# Patient Record
Sex: FEMALE | Race: WHITE | NOT HISPANIC OR LATINO | Employment: OTHER | URBAN - METROPOLITAN AREA
[De-identification: names, ages, dates, MRNs, and addresses within clinical notes are randomized per-mention and may not be internally consistent; named-entity substitution may affect disease eponyms.]

---

## 2019-11-20 ENCOUNTER — OFFICE VISIT (OUTPATIENT)
Dept: GYNECOLOGIC ONCOLOGY | Facility: CLINIC | Age: 64
End: 2019-11-20
Payer: MEDICARE

## 2019-11-20 VITALS
BODY MASS INDEX: 30.91 KG/M2 | SYSTOLIC BLOOD PRESSURE: 140 MMHG | RESPIRATION RATE: 16 BRPM | HEART RATE: 95 BPM | DIASTOLIC BLOOD PRESSURE: 82 MMHG | WEIGHT: 169 LBS

## 2019-11-20 DIAGNOSIS — C54.1 ENDOMETRIAL CANCER (HCC): Primary | ICD-10-CM

## 2019-11-20 PROCEDURE — 99202 OFFICE O/P NEW SF 15 MIN: CPT | Performed by: OBSTETRICS & GYNECOLOGY

## 2019-11-20 NOTE — ASSESSMENT & PLAN NOTE
Patient with endometrial hyperplasia with atypia underwent laparoscopic hysterectomy bilateral salpingo-oophorectomy by Dr Beth Gilbert with incidental diagnosis of likely stage IA grade 1 endometrial cancer  Low risk  Review by [facility] pathology pending  I have discussed with patient natural history of early stage/low risk endometrial cancer  As per [employer] guidelines, I will order a CT scan of the chest, abdomen and pelvis to rule out the remote possibility of lymphadenopathy or other suspicious findings  In the absence of those, there is no indication for treatment and we will recommend surveillance  I discussed surveillance program with visits approximately every 6 months the 1st 2 years and yearly after that  I plan to see her back with results of CT scan and after review of outside pathology in approximately 2-3 months  Routine surveillance to start after that  She has been encouraged to continue to follow with Dr Beth Gilbert for yearly gynecologic exams and ongoing gynecologic health needs

## 2019-11-20 NOTE — LETTER
November 20, 2019     Alexey Blanco MD  [address] [facility] [address]    Patient: Carlee James   YOB: 1955   Date of Visit: 11/20/2019       Dear Dr Mily Boswell: Thank you for referring France Todd to me for evaluation  Below are my notes for this consultation  If you have questions, please do not hesitate to call me  I look forward to following your patient along with you  Sincerely,        Justice Carnes MD        CC: No Recipients  Justice Carnes MD  11/20/2019  4:35 PM  Sign at close encounter  Assessment/Plan:    Problem List Items Addressed This Visit        Genitourinary    Endometrial cancer [facility]) - Primary     Patient with endometrial hyperplasia with atypia underwent laparoscopic hysterectomy bilateral salpingo-oophorectomy by Dr Mily Boswell with incidental diagnosis of likely stage IA grade 1 endometrial cancer  Low risk  Review by [facility] pathology pending  I have discussed with patient natural history of early stage/low risk endometrial cancer  As per [employer] guidelines, I will order a CT scan of the chest, abdomen and pelvis to rule out the remote possibility of lymphadenopathy or other suspicious findings  In the absence of those, there is no indication for treatment and we will recommend surveillance  I discussed surveillance program with visits approximately every 6 months the 1st 2 years and yearly after that  I plan to see her back with results of CT scan and after review of outside pathology in approximately 2-3 months  Routine surveillance to start after that  She has been encouraged to continue to follow with Dr Mily Boswell for yearly gynecologic exams and ongoing gynecologic health needs  Relevant Orders    CT chest abdomen pelvis w contrast    BUN    Creatinine, serum        I spent approximately 30 minutes in the care of this patient    More than 50% of the time was devoted to review of outside records, face-to-face counseling regarding natural history of atypical endometrial hyperplasia, natural history and treatment recommendations for endometrial cancer, discussion of upcoming plan of care and answering questions  All questions were answered to patient and 's satisfaction  CHIEF COMPLAINT:   Here for consultation / 2nd opinion due to newly diagnosed endometrial cancer  Subjective:     Problem:  Cancer Staging  Endometrial cancer [facility])  Staging form: Corpus Uteri - Carcinoma, AJCC 8th Edition  - Clinical: No stage assigned - Unsigned        Patient ID: Farhana Verdin is a 71 y o  female  HPI  74-year-old  menopausal female referred for consultation by Dr Susie Ervin after recent diagnosis of endometrial cancer  She underwent menopause in her late 45s  Recently, experience postmenopausal bleeding and biopsy demonstrated atypical endometrial hyperplasia  After counseling, she underwent laparoscopic hysterectomy bilateral salpingo-oophorectomy 2019 at [facility]   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, FIGO grade 1, invasive 5/15 mm, there was no evidence of lymphovascular invasion and no cervical stromal invasion  No evidence of extra uterine disease  Findings consistent with likely stage IA grade 1 tumor  Comprehensive surgical staging was not performed  Patient was referred for consultation/2nd opinion management recommendations  She is asymptomatic  Has recovered well from surgery  Denies vaginal bleeding, drainage or discharge  She is scheduled to have postoperative vaginal cuff check with Dr Susie Ervin later this week  Colonoscopy and mammogram up-to-date  Both performed in the summer of 2019  Reportedly normal     Review of Systems  As above  Twelve point review of systems otherwise unremarkable  No current outpatient medications on file       No current facility-administered medications for this visit  No Known Allergies    Past Medical History:   Diagnosis Date    Chronic kidney disease        Past Surgical History:   Procedure Laterality Date    HYSTERECTOMY  10/23/2016    Dr Nanci Mcdonald &INDWELL STENT INSRT Left 2016    Procedure: CYSTOSCOPY; URETEROSCOPY; HOLMIUM LASER LITHOTRIPSY; BASKET STONE EXTRACTION; RETROGRADE PYELOGRAM; STENT PLACEMENT ;  Surgeon: Dariela Sanchez MD;  [facility];  Service: Urology    OR CYSTO/URETERO W/LITHOTRIPSY &INDWELL STENT INSRT Left 2016    Procedure: Tao Ortiz;  Surgeon: Dariela Sanchez MD;  [facility];  Service: Urology    TUBAL LIGATION         OB History        3    Para   3    Term                AB        Living           SAB        TAB        Ectopic        Multiple        Live Births                     History reviewed  No pertinent family history  The following portions of the patient's history were reviewed and updated as appropriate: allergies, current medications, past family history, past medical history, past social history, past surgical history and problem list       Objective:    Blood pressure 140/82, pulse 95, resp  rate 16, weight 76 7 kg (169 lb)  Body mass index is 30 91 kg/m²  Physical Exam  Physical exam deferred  Surgical pathology reports from [facility] reviewed  Review by [facility]'s pathology pending  Reports scanned into Epic      Jj Holman MD, [address]  2019  4:34 PM

## 2019-11-20 NOTE — PROGRESS NOTES
Assessment/Plan:    Problem List Items Addressed This Visit        Genitourinary    Endometrial cancer ([facility] [address] ) - Primary     Patient with endometrial hyperplasia with atypia underwent laparoscopic hysterectomy bilateral salpingo-oophorectomy by Dr Josselyn Sykes with incidental diagnosis of likely stage IA grade 1 endometrial cancer  Low risk  Review by [facility] pathology pending  I have discussed with patient natural history of early stage/low risk endometrial cancer  As per [facility] guidelines, I will order a CT scan of the chest, abdomen and pelvis to rule out the remote possibility of lymphadenopathy or other suspicious findings  In the absence of those, there is no indication for treatment and we will recommend surveillance  I discussed surveillance program with visits approximately every 6 months the 1st 2 years and yearly after that  I plan to see her back with results of CT scan and after review of outside pathology in approximately 2-3 months  Routine surveillance to start after that  She has been encouraged to continue to follow with Dr Josselyn Sykes for yearly gynecologic exams and ongoing gynecologic health needs  Relevant Orders    CT chest abdomen pelvis w contrast    BUN    Creatinine, serum        I spent approximately 30 minutes in the care of this patient  More than 50% of the time was devoted to review of outside records, face-to-face counseling regarding natural history of atypical endometrial hyperplasia, natural history and treatment recommendations for endometrial cancer, discussion of upcoming plan of care and answering questions  All questions were answered to patient and 's satisfaction  CHIEF COMPLAINT:   Here for consultation / 2nd opinion due to newly diagnosed endometrial cancer      Subjective:     Problem:  Cancer Staging  Endometrial cancer [facility])  Staging form: Corpus Uteri - Carcinoma, AJCC 8th Edition  - Clinical: No stage assigned - Unsigned        Patient ID: Nitza Horvath is a 71 y o  female  HPI  54-year-old  menopausal female referred for consultation by Dr Bessy Prakash after recent diagnosis of endometrial cancer  She underwent menopause in her late 45s  Recently, experience postmenopausal bleeding and biopsy demonstrated atypical endometrial hyperplasia  After counseling, she underwent laparoscopic hysterectomy bilateral salpingo-oophorectomy 2019 at [facility]   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, FIGO grade 1, invasive 5/15 mm, there was no evidence of lymphovascular invasion and no cervical stromal invasion  No evidence of extra uterine disease  Findings consistent with likely stage IA grade 1 tumor  Comprehensive surgical staging was not performed  Patient was referred for consultation/2nd opinion management recommendations  She is asymptomatic  Has recovered well from surgery  Denies vaginal bleeding, drainage or discharge  She is scheduled to have postoperative vaginal cuff check with Dr Bessy Prakash later this week  Colonoscopy and mammogram up-to-date  Both performed in the summer of 2019  Reportedly normal     Review of Systems  As above  Twelve point review of systems otherwise unremarkable  No current outpatient medications on file  No current facility-administered medications for this visit          No Known Allergies    Past Medical History:   Diagnosis Date    Chronic kidney disease        Past Surgical History:   Procedure Laterality Date    HYSTERECTOMY  10/23/2016    Dr Coco Lockett &INDWELL STENT INSRT Left 2016    Procedure: CYSTOSCOPY; URETEROSCOPY; HOLMIUM LASER LITHOTRIPSY; BASKET STONE EXTRACTION; RETROGRADE PYELOGRAM; STENT PLACEMENT ;  Surgeon: Navjot Bradshaw MD;  [facility];  Service: Urology    [place] CYSTO/URETERO W/LITHOTRIPSY &INDWELL STENT INSRT Left 2016    Procedure: CYSTOSCOPY, INSERTION STENT URETERAL;  Surgeon: Inés Bernstein MD;  [facility];  Service: Urology    TUBAL LIGATION         OB History        3    Para   3    Term                AB        Living           SAB        TAB        Ectopic        Multiple        Live Births                     History reviewed  No pertinent family history  The following portions of the patient's history were reviewed and updated as appropriate: allergies, current medications, past family history, past medical history, past social history, past surgical history and problem list       Objective:    Blood pressure 140/82, pulse 95, resp  rate 16, weight 76 7 kg (169 lb)  Body mass index is 30 91 kg/m²  Physical Exam  Physical exam deferred  Surgical pathology reports from [facility] reviewed  Review by [facility] pathology pending  Reports scanned into Epic      Gregory Fernandez MD, [facility]  2019  4:34 PM

## 2019-11-25 ENCOUNTER — APPOINTMENT (OUTPATIENT)
Dept: LAB | Facility: CLINIC | Age: 64
End: 2019-11-25
Payer: MEDICARE

## 2019-11-25 DIAGNOSIS — C54.1 ENDOMETRIAL CANCER (HCC): ICD-10-CM

## 2019-11-25 LAB
BUN SERPL-MCNC: 10 MG/DL (ref 5–25)
CREAT SERPL-MCNC: 0.68 MG/DL (ref 0.6–1.3)
GFR SERPL CREATININE-BSD FRML MDRD: 92 ML/MIN/1.73SQ M

## 2019-11-25 PROCEDURE — 84520 ASSAY OF UREA NITROGEN: CPT

## 2019-11-25 PROCEDURE — 36415 COLL VENOUS BLD VENIPUNCTURE: CPT

## 2019-11-25 PROCEDURE — 82565 ASSAY OF CREATININE: CPT

## 2019-11-29 ENCOUNTER — HOSPITAL ENCOUNTER (OUTPATIENT)
Dept: RADIOLOGY | Facility: HOSPITAL | Age: 64
Discharge: HOME | End: 2019-11-29
Attending: OBSTETRICS & GYNECOLOGY
Payer: MEDICARE

## 2019-11-29 DIAGNOSIS — C54.1 ENDOMETRIAL CANCER (HCC): ICD-10-CM

## 2019-11-29 PROCEDURE — 71260 CT THORAX DX C+: CPT

## 2019-11-29 PROCEDURE — 74177 CT ABD & PELVIS W/CONTRAST: CPT

## 2019-11-29 RX ADMIN — IOHEXOL 100 ML: 350 INJECTION, SOLUTION INTRAVENOUS at 15:59

## 2020-02-19 ENCOUNTER — OFFICE VISIT (OUTPATIENT)
Dept: GYNECOLOGIC ONCOLOGY | Facility: CLINIC | Age: 65
End: 2020-02-19
Payer: MEDICARE

## 2020-02-19 VITALS
HEIGHT: 62 IN | WEIGHT: 172 LBS | SYSTOLIC BLOOD PRESSURE: 124 MMHG | TEMPERATURE: 98.1 F | HEART RATE: 78 BPM | DIASTOLIC BLOOD PRESSURE: 82 MMHG | BODY MASS INDEX: 31.65 KG/M2

## 2020-02-19 DIAGNOSIS — Z85.42 ENCOUNTER FOR FOLLOW-UP SURVEILLANCE OF ENDOMETRIAL CANCER: Primary | ICD-10-CM

## 2020-02-19 DIAGNOSIS — Z08 ENCOUNTER FOR FOLLOW-UP SURVEILLANCE OF ENDOMETRIAL CANCER: Primary | ICD-10-CM

## 2020-02-19 PROCEDURE — 99212 OFFICE O/P EST SF 10 MIN: CPT | Performed by: OBSTETRICS & GYNECOLOGY

## 2020-02-19 NOTE — LETTER
February 19, 2020     Russel Bhatti MD  [address] [facility] [address]    Patient: Sherif Dunbar   YOB: 1955   Date of Visit: 2/19/2020       Dear Dr Heather Spencer: Thank you for referring René Moses to me for evaluation  Below are my notes for this consultation  If you have questions, please do not hesitate to call me  I look forward to following your patient along with you  Sincerely,        Tomi Moon MD        CC: No Recipients  Tomi Moon MD  2/19/2020  2:19 PM  Sign at close encounter  Assessment/Plan:    Problem List Items Addressed This Visit        Other    Encounter for follow-up surveillance of endometrial cancer - Primary     Patient has no clinical evidence of disease  Baseline CT scan shows no evidence of lymphadenopathy or other measurable disease  I plan to see her back in 6 months for routine surveillance  She knows to contact us if she develops abdominal or pelvic pain vaginal bleeding, drainage, discharge, changes in bowel or bladder function or any new symptoms  CHIEF COMPLAINT:   Surveillance for endometrial cancer      Problem:  Cancer Staging  Encounter for follow-up surveillance of endometrial cancer  Staging form: Corpus Uteri - Carcinoma, AJCC 8th Edition  - Clinical stage from 10/23/2019: FIGO Stage IA (cT1a, cN0, cM0) - Signed by Tomi Moon MD on 2/19/2020  - Pathologic: FIGO Stage IA (pT1a) - Unsigned        Previous therapy:     Encounter for follow-up surveillance of endometrial cancer    10/23/2019 -  Cancer Staged     Staging form: Corpus Uteri - Carcinoma, AJCC 8th Edition  - Clinical stage from 10/23/2019: FIGO Stage IA (cT1a, cN0, cM0) - Signed by Tomi Moon MD on 2/19/2020  Tumor size (mm): 30  Histologic grade (G): G1  Histologic grading system: 3 grade system  Histopathologic type: Endometrioid adenocarcinoma, NOS  Diagnostic confirmation: Positive histology  Specimen type: Excision  Staged by: Managing physician  Lymph node metastasis: Absent  Omentectomy performed: No  Morcellation performed: No  Stage used in treatment planning: Yes  National guidelines used in treatment planning: Yes        10/23/2019 Surgery     Robotic hysterectomy bilateral salpingo-oophorectomy at [facility] by Dr Adrienne Wan  Findings consistent with likely clinical stage I grade 1 endometrial cancer  Adjuvant therapy not indicated  Patient ID: Roselia Vargas is a 71 y o  female  HPI  Patient underwent robotic hysterectomy bilateral salpingo-oophorectomy for endometrial intraepithelial neoplasia in October 2019  Final pathology demonstrated incidental likely stage IA grade 1 endometrial cancer with no high risk features  She was evaluated by me shortly after surgery and CT scan performed which demonstrated no evidence of lymphadenopathy or other abnormalities  Recommended observation  Presents today for follow-up  Denies vaginal bleeding, drainage or discharge  Denies changes in bowel or bladder function  Denies abdominal or pelvic pain  The following portions of the patient's history were reviewed and updated as appropriate: allergies, current medications, past family history, past medical history, past social history, past surgical history and problem list     Review of Systems  Above  Twelve point review of systems otherwise unremarkable  No current outpatient medications on file  No current facility-administered medications for this visit  Objective:    Blood pressure 124/82, pulse 78, temperature 98 1 °F (36 7 °C), temperature source Oral, height 5' 2" (1 575 m), weight 78 kg (172 lb)  Body mass index is 31 46 kg/m²  Body surface area is 1 79 meters squared  Physical Exam   Constitutional: She appears well-developed and well-nourished  No distress  Cardiovascular: Normal rate, regular rhythm and normal heart sounds  No murmur heard    Pulmonary/Chest: Effort normal and breath sounds normal  No respiratory distress  Abdominal: Soft  Bowel sounds are normal  She exhibits no distension  Genitourinary:   Genitourinary Comments: Normal external genitalia  Vulva vagina with no lesions  No discharge  Vagina with scant residual visible suture material in place  No dehiscence  No blood  No tumor  No granulation tissue  Bimanual exam negative for masses  Cervix, uterus and bilateral tubes and ovaries are surgically absent  Skin: She is not diaphoretic       Jose Watkins MD, [address]  2/19/2020  2:19 PM

## 2020-02-19 NOTE — PROGRESS NOTES
Assessment/Plan:    Problem List Items Addressed This Visit        Other    Encounter for follow-up surveillance of endometrial cancer - Primary     Patient has no clinical evidence of disease  Baseline CT scan shows no evidence of lymphadenopathy or other measurable disease  I plan to see her back in 6 months for routine surveillance  She knows to contact us if she develops abdominal or pelvic pain vaginal bleeding, drainage, discharge, changes in bowel or bladder function or any new symptoms  CHIEF COMPLAINT:   Surveillance for endometrial cancer  Problem:  Cancer Staging  Encounter for follow-up surveillance of endometrial cancer  Staging form: Corpus Uteri - Carcinoma, AJCC 8th Edition  - Clinical stage from 10/23/2019: FIGO Stage IA (cT1a, cN0, cM0) - Signed by Jason Patton MD on 2/19/2020  - Pathologic: FIGO Stage IA (pT1a) - Unsigned        Previous therapy:     Encounter for follow-up surveillance of endometrial cancer    10/23/2019 -  Cancer Staged     Staging form: Corpus Uteri - Carcinoma, AJCC 8th Edition  - Clinical stage from 10/23/2019: FIGO Stage IA (cT1a, cN0, cM0) - Signed by Jason Patton MD on 2/19/2020  Tumor size (mm): 30  Histologic grade (G): G1  Histologic grading system: 3 grade system  Histopathologic type: Endometrioid adenocarcinoma, NOS  Diagnostic confirmation: Positive histology  Specimen type: Excision  Staged by: Managing physician  Lymph node metastasis: Absent  Omentectomy performed: No  Morcellation performed: No  Stage used in treatment planning: Yes  National guidelines used in treatment planning: Yes        10/23/2019 Surgery     Robotic hysterectomy bilateral salpingo-oophorectomy at [facility] by Dr Edson Ramos  Findings consistent with likely clinical stage I grade 1 endometrial cancer  Adjuvant therapy not indicated             Patient ID: Hailey Muñiz is a 71 y o  female  HPI  Patient underwent robotic hysterectomy bilateral salpingo-oophorectomy for endometrial intraepithelial neoplasia in October 2019  Final pathology demonstrated incidental likely stage IA grade 1 endometrial cancer with no high risk features  She was evaluated by me shortly after surgery and CT scan performed which demonstrated no evidence of lymphadenopathy or other abnormalities  Recommended observation  Presents today for follow-up  Denies vaginal bleeding, drainage or discharge  Denies changes in bowel or bladder function  Denies abdominal or pelvic pain  The following portions of the patient's history were reviewed and updated as appropriate: allergies, current medications, past family history, past medical history, past social history, past surgical history and problem list     Review of Systems  Above  Twelve point review of systems otherwise unremarkable  No current outpatient medications on file  No current facility-administered medications for this visit  Objective:    Blood pressure 124/82, pulse 78, temperature 98 1 °F (36 7 °C), temperature source Oral, height 5' 2" (1 575 m), weight 78 kg (172 lb)  Body mass index is 31 46 kg/m²  Body surface area is 1 79 meters squared  Physical Exam   Constitutional: She appears well-developed and well-nourished  No distress  Cardiovascular: Normal rate, regular rhythm and normal heart sounds  No murmur heard  Pulmonary/Chest: Effort normal and breath sounds normal  No respiratory distress  Abdominal: Soft  Bowel sounds are normal  She exhibits no distension  Genitourinary:   Genitourinary Comments: Normal external genitalia  Vulva vagina with no lesions  No discharge  Vagina with scant residual visible suture material in place  No dehiscence  No blood  No tumor  No granulation tissue  Bimanual exam negative for masses  Cervix, uterus and bilateral tubes and ovaries are surgically absent  Skin: She is not diaphoretic       Enid Hadley MD, [address] [place] [address]  2/19/2020 2:19 PM

## 2020-02-19 NOTE — ASSESSMENT & PLAN NOTE
Patient has no clinical evidence of disease  Baseline CT scan shows no evidence of lymphadenopathy or other measurable disease  I plan to see her back in 6 months for routine surveillance  She knows to contact us if she develops abdominal or pelvic pain vaginal bleeding, drainage, discharge, changes in bowel or bladder function or any new symptoms

## 2020-08-17 ENCOUNTER — TELEPHONE (OUTPATIENT)
Dept: GYNECOLOGIC ONCOLOGY | Facility: CLINIC | Age: 65
End: 2020-08-17

## 2020-08-17 NOTE — TELEPHONE ENCOUNTER
Phone call to the patient to reschedule her appointment on 8/19 with Dr David Butts due to him being out of the office  He has availability the following day if that works for the patient  Left message to return my call

## 2020-08-19 ENCOUNTER — TELEPHONE (OUTPATIENT)
Dept: GYNECOLOGIC ONCOLOGY | Facility: CLINIC | Age: 65
End: 2020-08-19

## 2020-08-19 NOTE — TELEPHONE ENCOUNTER
Patient called back to go over screening questions for appointment on 8/20  All answers were negative  Patient aware of masking policy and 1 visitor policy as well

## 2020-08-20 ENCOUNTER — OFFICE VISIT (OUTPATIENT)
Dept: GYNECOLOGIC ONCOLOGY | Facility: CLINIC | Age: 65
End: 2020-08-20
Payer: MEDICARE

## 2020-08-20 VITALS
OXYGEN SATURATION: 98 % | HEART RATE: 69 BPM | HEIGHT: 62 IN | DIASTOLIC BLOOD PRESSURE: 82 MMHG | WEIGHT: 174 LBS | BODY MASS INDEX: 32.02 KG/M2 | SYSTOLIC BLOOD PRESSURE: 140 MMHG | TEMPERATURE: 98.1 F

## 2020-08-20 DIAGNOSIS — Z08 ENCOUNTER FOR FOLLOW-UP SURVEILLANCE OF ENDOMETRIAL CANCER: Primary | ICD-10-CM

## 2020-08-20 DIAGNOSIS — Z12.39 BREAST CANCER SCREENING: ICD-10-CM

## 2020-08-20 DIAGNOSIS — Z85.42 ENCOUNTER FOR FOLLOW-UP SURVEILLANCE OF ENDOMETRIAL CANCER: Primary | ICD-10-CM

## 2020-08-20 PROCEDURE — 99213 OFFICE O/P EST LOW 20 MIN: CPT | Performed by: OBSTETRICS & GYNECOLOGY

## 2020-08-20 NOTE — ASSESSMENT & PLAN NOTE
Patient had robotic hysterectomy for endometrial intraepithelial neoplasia by Dr Alanna Mccullough at [facility] with incidental finding of low risk stage IA endometrial cancer  She remains with no evidence of disease  Plan to see her back at 6 month intervals until she has 2 years out from diagnosis  She knows to contact us if she develops any new symptoms

## 2020-08-20 NOTE — PROGRESS NOTES
Assessment/Plan:    Problem List Items Addressed This Visit        Other    Encounter for follow-up surveillance of endometrial cancer - Primary     Patient had robotic hysterectomy for endometrial intraepithelial neoplasia by Dr Erick Mayers at [facility] with incidental finding of low risk stage IA endometrial cancer  She remains with no evidence of disease  Plan to see her back at 6 month intervals until she has 2 years out from diagnosis  She knows to contact us if she develops any new symptoms  Breast cancer screening     Due for mammogram   Mammogram ordered  Relevant Orders    Mammo screening bilateral w 3d & cad            CHIEF COMPLAINT:   Here for endometrial cancer surveillance  Due for mammogram next month  Problem:  Cancer Staging  Encounter for follow-up surveillance of endometrial cancer  Staging form: Corpus Uteri - Carcinoma, AJCC 8th Edition  - Clinical stage from 10/23/2019: FIGO Stage IA (cT1a, cN0, cM0) - Signed by Rigo Jenkins MD on 2/19/2020  - Pathologic: FIGO Stage IA (pT1a) - Unsigned        Previous therapy:  Oncology History   Encounter for follow-up surveillance of endometrial cancer   10/23/2019 -  Cancer Staged    Staging form: Corpus Uteri - Carcinoma, AJCC 8th Edition  - Clinical stage from 10/23/2019: FIGO Stage IA (cT1a, cN0, cM0) - Signed by Rigo Jenkins MD on 2/19/2020  Tumor size (mm): 30  Histologic grade (G): G1  Histologic grading system: 3 grade system  Histopathologic type: Endometrioid adenocarcinoma, NOS  Diagnostic confirmation: Positive histology  Specimen type: Excision  Staged by: Managing physician  Lymph node metastasis: Absent  Omentectomy performed: No  Morcellation performed: No  Stage used in treatment planning: Yes  National guidelines used in treatment planning: Yes       10/23/2019 Surgery    Robotic hysterectomy bilateral salpingo-oophorectomy at [facility] by Dr Erick Mayers    Findings consistent with likely clinical stage I grade 1 endometrial cancer  Adjuvant therapy not indicated  Patient ID: Kathrine Uriostegui is a 76 y o  female  HPI  Patient presents for routine follow-up for low risk stage IA endometrial cancer  She had robotic hysterectomy in October 2019 for endometrial intraepithelial neoplasia with incidental diagnosis of 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  Low risk  Adjuvant therapy was not indicated  CT scan was negative  She denies vaginal bleeding, drainage or discharge  Denies pelvic or abdominal pain  Denies breast masses, nipple discharge or other complaints  The following portions of the patient's history were reviewed and updated as appropriate: allergies, current medications, past family history, past medical history, past social history, past surgical history and problem list     Review of Systems  As above  Twelve point review of systems is otherwise unremarkable  No current outpatient medications on file  No current facility-administered medications for this visit  Objective:    Blood pressure 140/82, pulse 69, temperature 98 1 °F (36 7 °C), temperature source Oral, height 5' 2" (1 575 m), weight 78 9 kg (174 lb), SpO2 98 %  Body mass index is 31 83 kg/m²  Body surface area is 1 8 meters squared  Physical Exam  Vitals signs reviewed  Constitutional:       Appearance: Normal appearance  She is not ill-appearing  Eyes:      General: No scleral icterus  Right eye: No discharge  Left eye: No discharge  Conjunctiva/sclera: Conjunctivae normal    Pulmonary:      Effort: Pulmonary effort is normal    Abdominal:      General: Abdomen is flat  There is no distension  Palpations: There is no mass  Tenderness: There is no abdominal tenderness  Hernia: No hernia is present  Genitourinary:     Comments: Normal external female genitalia  Normal Bartholin's and Bowbells's glands   Normal urethral meatus and no evidence of urethral discharge or masses  Bladder without fullness mass or tenderness  Vagina without lesion or discharge  No significant pelvic organ prolapse noted  Cervix and uterus are surgically absent  Bimanual exam demonstrates no evidence of pelvic masses  Anus without fissure of lesion  Neurological:      Mental Status: She is alert         Beverly Rae MD, [address] [place] [address], [facility]  8/20/2020  10:14 AM

## 2021-02-12 ENCOUNTER — HOSPITAL ENCOUNTER (OUTPATIENT)
Dept: RADIOLOGY | Age: 66
Discharge: HOME | End: 2021-02-12
Payer: MEDICARE

## 2021-02-12 VITALS — HEIGHT: 62 IN | BODY MASS INDEX: 30.55 KG/M2 | WEIGHT: 166 LBS

## 2021-02-12 DIAGNOSIS — Z12.31 ENCOUNTER FOR SCREENING MAMMOGRAM FOR MALIGNANT NEOPLASM OF BREAST: ICD-10-CM

## 2021-02-12 DIAGNOSIS — Z12.39 BREAST CANCER SCREENING: ICD-10-CM

## 2021-02-12 PROCEDURE — 77067 SCR MAMMO BI INCL CAD: CPT

## 2021-02-12 PROCEDURE — 77063 BREAST TOMOSYNTHESIS BI: CPT

## 2021-02-24 ENCOUNTER — OFFICE VISIT (OUTPATIENT)
Dept: GYNECOLOGIC ONCOLOGY | Facility: CLINIC | Age: 66
End: 2021-02-24
Payer: MEDICARE

## 2021-02-24 VITALS
RESPIRATION RATE: 18 BRPM | DIASTOLIC BLOOD PRESSURE: 82 MMHG | TEMPERATURE: 98.9 F | HEART RATE: 62 BPM | BODY MASS INDEX: 30.91 KG/M2 | WEIGHT: 168 LBS | HEIGHT: 62 IN | SYSTOLIC BLOOD PRESSURE: 144 MMHG

## 2021-02-24 DIAGNOSIS — Z08 ENCOUNTER FOR FOLLOW-UP SURVEILLANCE OF ENDOMETRIAL CANCER: Primary | ICD-10-CM

## 2021-02-24 DIAGNOSIS — Z85.42 HISTORY OF ENDOMETRIAL CANCER: ICD-10-CM

## 2021-02-24 DIAGNOSIS — Z85.42 ENCOUNTER FOR FOLLOW-UP SURVEILLANCE OF ENDOMETRIAL CANCER: Primary | ICD-10-CM

## 2021-02-24 PROCEDURE — 99212 OFFICE O/P EST SF 10 MIN: CPT | Performed by: NURSE PRACTITIONER

## 2021-02-24 NOTE — ASSESSMENT & PLAN NOTE
50-year-old with a history of stage IA1 endometrial cancer diagnosed in October 2019  Low risk; no adjuvant therapy was indicated  She is feeling well  Gyn exam is normal  Her PS is 0  RTO in 6 months for next surveillance visit  She will call in the interim with any new concerns

## 2021-02-24 NOTE — PROGRESS NOTES
Assessment/Plan:    Problem List Items Addressed This Visit        Other    Encounter for follow-up surveillance of endometrial cancer - Primary     50-year-old with a history of stage IA1 endometrial cancer diagnosed in October 2019  Low risk; no adjuvant therapy was indicated  She is feeling well  Gyn exam is normal  Her PS is 0  RTO in 6 months for next surveillance visit  She will call in the interim with any new concerns  History of endometrial cancer            CHIEF COMPLAINT: Endometrial cancer surveillance      Subjective:     Problem:  Cancer Staging  Encounter for follow-up surveillance of endometrial cancer  Staging form: Corpus Uteri - Carcinoma, AJCC 8th Edition  - Clinical stage from 10/23/2019: FIGO Stage IA (cT1a, cN0, cM0) - Signed by Beatriz Moran MD on 2/19/2020  - Pathologic: FIGO Stage IA (pT1a) - Unsigned      Previous therapy:  Oncology History   Encounter for follow-up surveillance of endometrial cancer   10/23/2019 -  Cancer Staged    Staging form: Corpus Uteri - Carcinoma, AJCC 8th Edition  - Clinical stage from 10/23/2019: FIGO Stage IA (cT1a, cN0, cM0) - Signed by Beatriz Moran MD on 2/19/2020  Tumor size (mm): 30  Histologic grade (G): G1  Histologic grading system: 3 grade system  Histopathologic type: Endometrioid adenocarcinoma, NOS  Diagnostic confirmation: Positive histology  Specimen type: Excision  Staged by: Managing physician  Lymph node metastasis: Absent  Omentectomy performed: No  Morcellation performed: No  Stage used in treatment planning: Yes  National guidelines used in treatment planning: Yes       10/23/2019 Surgery    Robotic hysterectomy bilateral salpingo-oophorectomy at [facility] by Dr Wu Beam  Findings consistent with likely clinical stage I grade 1 endometrial cancer  Adjuvant therapy not indicated  Patient ID: Rick Rollins is a 78 y o  female     Rasheeda Sanchez has no new concerns since her last visit   She denies nausea or vomiting  Her appetite is appropriate  Normal bowel and bladder function  She denies abdominal or pelvic pain  She is without vaginal bleeding or discharge  She is ambulatory  UTD with health care maintenance  Review of Systems   Constitutional: Negative for activity change, appetite change, chills, fatigue, fever and unexpected weight change  HENT: Negative for mouth sores  Eyes: Negative  Respiratory: Negative for cough, chest tightness, shortness of breath and wheezing  Cardiovascular: Negative for chest pain, palpitations and leg swelling  Gastrointestinal: Negative for abdominal distention, abdominal pain, anal bleeding, blood in stool, constipation, diarrhea, nausea and vomiting  Endocrine: Negative  Genitourinary: Negative for difficulty urinating, dysuria, flank pain, frequency, hematuria, pelvic pain, urgency, vaginal bleeding, vaginal discharge and vaginal pain  Musculoskeletal: Negative for arthralgias and myalgias  Skin: Negative for color change, pallor and rash  Neurological: Negative for dizziness, weakness, numbness and headaches  Hematological: Negative  Psychiatric/Behavioral: The patient is not nervous/anxious  No current outpatient medications on file  No current facility-administered medications for this visit          No Known Allergies    Past Medical History:   Diagnosis Date    Chronic kidney disease        Past Surgical History:   Procedure Laterality Date    HYSTERECTOMY  10/23/2016    Dr Baron Lyon &[facility]WE[facility] STENT INSRT Left 6/1/2016    Procedure: CYSTOSCOPY; URETEROSCOPY; HOLMIUM LASER LITHOTRIPSY; BASKET STONE EXTRACTION; RETROGRADE PYELOGRAM; STENT PLACEMENT ;  Surgeon: Sera Baum MD;  [facility];  Service: Urology    MD CYSTO/URETERO W/LITHOTRIPSY &INDWELL STENT INSRT Left 4/22/2016    Procedure: Christoph Mai STENT URETERAL;  Surgeon: Sera Baum MD; [facility];  Service: Urology    TUBAL LIGATION         OB History        3    Para   3    Term   3            AB        Living           SAB        TAB        Ectopic        Multiple        Live Births                     Family History   Problem Relation Age of Onset    No Known Problems Mother     No Known Problems Father     No Known Problems Sister     No Known Problems Maternal Grandmother     No Known Problems Maternal Grandfather     No Known Problems Paternal Grandmother     No Known Problems Paternal Grandfather     No Known Problems Maternal Aunt     No Known Problems Maternal Aunt     No Known Problems Maternal Aunt     No Known Problems Maternal Aunt     No Known Problems Paternal Aunt        The following portions of the patient's history were reviewed and updated as appropriate: allergies, current medications, past family history, past medical history, past social history, past surgical history and problem list       Objective:    Blood pressure 144/82, pulse 62, temperature 98 9 °F (37 2 °C), temperature source Temporal, resp  rate 18, height 5' 2" (1 575 m), weight 76 2 kg (168 lb)  Body mass index is 30 73 kg/m²  Physical Exam  Vitals signs reviewed  Exam conducted with a chaperone present  Constitutional:       General: She is not in acute distress  Appearance: Normal appearance  She is not ill-appearing  HENT:      Head: Normocephalic and atraumatic  Mouth/Throat:      Mouth: Mucous membranes are moist    Eyes:      General:         Right eye: No discharge  Left eye: No discharge  Conjunctiva/sclera: Conjunctivae normal    Pulmonary:      Effort: Pulmonary effort is normal  No respiratory distress  Abdominal:      Palpations: Abdomen is soft  There is no mass  Tenderness: There is no abdominal tenderness  Hernia: No hernia is present  Genitourinary:     Comments:  The external female genitalia is normal  The bartholin's, uretheral and skenes glands are normal  The urethral meatus is normal (midline with no lesions)  Anus without fissure or lesion  Speculum exam reveals a grossly normal vagina  No masses, lesions,discharge or bleeding  No significant cystocele or rectocele noted  Bimanual exam notes a surgical absent cervix, uterus and adnexal structures  No masses or fullness  Bladder is without fullness, mass or tenderness  Musculoskeletal:      Right lower leg: No edema  Left lower leg: No edema  Skin:     General: Skin is warm and dry  Coloration: Skin is not jaundiced  Findings: No rash  Neurological:      General: No focal deficit present  Mental Status: She is alert and oriented to person, place, and time  Cranial Nerves: No cranial nerve deficit  Sensory: No sensory deficit  Motor: No weakness  Gait: Gait normal    Psychiatric:         Mood and Affect: Mood normal          Behavior: Behavior normal          Thought Content:  Thought content normal          Judgment: Judgment normal            No results found for:   Lab Results   Component Value Date    WBC 4 00 (L) 05/19/2016    HGB 11 9 05/19/2016    HCT 38 5 05/19/2016    MCV 75 (L) 05/19/2016     05/19/2016     Lab Results   Component Value Date    K 4 1 05/19/2016     05/19/2016    CO2 28 05/19/2016    BUN 10 11/25/2019    CREATININE 0 68 11/25/2019    CALCIUM 9 2 05/19/2016    AST 41 04/21/2016    ALT 45 04/21/2016    ALKPHOS 55 04/21/2016    EGFR 92 11/25/2019        Trend:  No results found for:

## 2021-08-25 ENCOUNTER — OFFICE VISIT (OUTPATIENT)
Dept: GYNECOLOGIC ONCOLOGY | Facility: CLINIC | Age: 66
End: 2021-08-25
Payer: MEDICARE

## 2021-08-25 VITALS
OXYGEN SATURATION: 97 % | TEMPERATURE: 97.8 F | SYSTOLIC BLOOD PRESSURE: 122 MMHG | RESPIRATION RATE: 16 BRPM | BODY MASS INDEX: 31.47 KG/M2 | HEART RATE: 74 BPM | HEIGHT: 62 IN | DIASTOLIC BLOOD PRESSURE: 84 MMHG | WEIGHT: 171 LBS

## 2021-08-25 DIAGNOSIS — Z85.42 ENCOUNTER FOR FOLLOW-UP SURVEILLANCE OF ENDOMETRIAL CANCER: Primary | ICD-10-CM

## 2021-08-25 DIAGNOSIS — Z85.42 HISTORY OF ENDOMETRIAL CANCER: ICD-10-CM

## 2021-08-25 DIAGNOSIS — Z08 ENCOUNTER FOR FOLLOW-UP SURVEILLANCE OF ENDOMETRIAL CANCER: Primary | ICD-10-CM

## 2021-08-25 PROCEDURE — 99212 OFFICE O/P EST SF 10 MIN: CPT | Performed by: NURSE PRACTITIONER

## 2021-08-25 NOTE — ASSESSMENT & PLAN NOTE
70-year-old with a history of stage IA1 endometrial cancer diagnosed in October 2019  She has no interval concerns  Her pelvic exam is normal  Her PS is 0  Patient will resume annual well-woman visits with her primary gynecologist  She is aware to call the office with future concerns or symptoms

## 2021-08-25 NOTE — PROGRESS NOTES
Assessment/Plan:    Problem List Items Addressed This Visit        Other    Encounter for follow-up surveillance of endometrial cancer - Primary     70-year-old with a history of stage IA1 endometrial cancer diagnosed in October 2019  She has no interval concerns  Her pelvic exam is normal  Her PS is 0  Patient will resume annual well-woman visits with her primary gynecologist  She is aware to call the office with future concerns or symptoms  History of endometrial cancer              CHIEF COMPLAINT: Endometrial cancer surveillance      Subjective:     Problem:  Cancer Staging  Encounter for follow-up surveillance of endometrial cancer  Staging form: Corpus Uteri - Carcinoma, AJCC 8th Edition  - Clinical stage from 10/23/2019: FIGO Stage IA (cT1a, cN0, cM0) - Signed by Mckenna Salas MD on 2/19/2020  - Pathologic: FIGO Stage IA (pT1a) - Unsigned      Previous therapy:  Oncology History   Encounter for follow-up surveillance of endometrial cancer   10/23/2019 -  Cancer Staged    Staging form: Corpus Uteri - Carcinoma, AJCC 8th Edition  - Clinical stage from 10/23/2019: FIGO Stage IA (cT1a, cN0, cM0) - Signed by Mckenna Salas MD on 2/19/2020  Tumor size (mm): 30  Histologic grade (G): G1  Histologic grading system: 3 grade system  Histopathologic type: Endometrioid adenocarcinoma, NOS  Diagnostic confirmation: Positive histology  Specimen type: Excision  Staged by: Managing physician  Lymph node metastasis: Absent  Omentectomy performed: No  Morcellation performed: No  Stage used in treatment planning: Yes  National guidelines used in treatment planning: Yes       10/23/2019 Surgery    Robotic hysterectomy bilateral salpingo[facility]oophorectomy at [facility] by Dr Stephani Segovia  Findings consistent with likely clinical stage I grade 1 endometrial cancer  Adjuvant therapy not indicated  Patient ID: Oleksandr Sultana is a 78 y o  female     Patient has no interval concerns   Denies symptoms of recurrent disease, including nausea/vomiting, constipation/diarrhea, abdominal pain, pelvic pain, changes in bladder function, and vaginal bleeding/discharge  Endorses a normal appetite and is without SOB, CP, and bloating  She is ambulatory and independent at home  UTD with mammogram and colonoscopy screening  Has not been vaccinated against COVID-19 and declines vaccination when education offered, stating that she is healthy, has a strong immune system, and rarely gets sick  Review of Systems   Constitutional: Negative for activity change, appetite change, chills, fatigue, fever and unexpected weight change  HENT: Negative for mouth sores  Eyes: Negative  Respiratory: Negative for cough, chest tightness, shortness of breath and wheezing  Cardiovascular: Negative for chest pain, palpitations and leg swelling  Gastrointestinal: Negative for abdominal distention, abdominal pain, anal bleeding, blood in stool, constipation, diarrhea, nausea and vomiting  Endocrine: Negative  Genitourinary: Negative for difficulty urinating, dysuria, flank pain, frequency, hematuria, pelvic pain, urgency, vaginal bleeding, vaginal discharge and vaginal pain  Musculoskeletal: Negative for arthralgias and myalgias  Skin: Negative for color change, pallor and rash  Neurological: Negative for dizziness, weakness, numbness and headaches  Hematological: Negative  Psychiatric/Behavioral: The patient is not nervous/anxious  No current outpatient medications on file  No current facility-administered medications for this visit         No Known Allergies    Past Medical History:   Diagnosis Date    Chronic kidney disease        Past Surgical History:   Procedure Laterality Date    HYSTERECTOMY  10/23/2016    Dr Shaniqua Elliott &[facility] STENT INSRT Left 6/1/2016    Procedure: CYSTOSCOPY; URETEROSCOPY; HOLMIUM LASER LITHOTRIPSY; BASKET STONE EXTRACTION; RETROGRADE PYELOGRAM; STENT PLACEMENT ;  Surgeon: Deo Walls MD;  [facility];  Service: Urology    [facility] CYSTO/URETERO W/LITHOTRIPSY &INDWELL STENT INSRT Left 2016    Procedure: CYSTOSCOPY, INSERTION STENT URETERAL;  Surgeon: Deo Walls MD;  [facility];  Service: Urology    TUBAL LIGATION         OB History        3    Para   3    Term   3            AB        Living           SAB        TAB        Ectopic        Multiple        Live Births                     Family History   Problem Relation Age of Onset    No Known Problems Mother     No Known Problems Father     No Known Problems Sister     No Known Problems Maternal Grandmother     No Known Problems Maternal Grandfather     No Known Problems Paternal Grandmother     No Known Problems Paternal Grandfather     No Known Problems Maternal Aunt     No Known Problems Maternal Aunt     No Known Problems Maternal Aunt     No Known Problems Maternal Aunt     No Known Problems Paternal Aunt        The following portions of the patient's history were reviewed and updated as appropriate: allergies, current medications, past family history, past medical history, past social history, past surgical history and problem list       Objective:    Blood pressure 122/84, pulse 74, temperature 97 8 °F (36 6 °C), temperature source Temporal, resp  rate 16, height 5' 2" (1 575 m), weight 77 6 kg (171 lb), SpO2 97 %  Body mass index is 31 28 kg/m²  Physical Exam  Vitals reviewed  Exam conducted with a chaperone present  Constitutional:       General: She is not in acute distress  Appearance: Normal appearance  She is not ill-appearing  HENT:      Head: Normocephalic and atraumatic  Mouth/Throat:      Mouth: Mucous membranes are moist    Eyes:      General:         Right eye: No discharge  Left eye: No discharge        Conjunctiva/sclera: Conjunctivae normal    Pulmonary:      Effort: Pulmonary effort is normal    Abdominal:      Palpations: Abdomen is soft  There is no mass  Tenderness: There is no abdominal tenderness  Hernia: No hernia is present  Genitourinary:     Comments: The external female genitalia is normal  The bartholin's, uretheral and skenes glands are normal  The urethral meatus is normal (midline with no lesions)  Anus without fissure or lesion  Speculum exam reveals a grossly normal vagina  No masses, lesions,discharge or bleeding  No significant cystocele or rectocele noted  Bimanual exam notes a surgical absent cervix, uterus and adnexal structures  No masses or fullness  Bladder is without fullness, mass or tenderness  Musculoskeletal:      Right lower leg: No edema  Left lower leg: No edema  Skin:     General: Skin is warm and dry  Coloration: Skin is not jaundiced  Findings: No rash  Neurological:      General: No focal deficit present  Mental Status: She is alert and oriented to person, place, and time  Cranial Nerves: No cranial nerve deficit  Sensory: No sensory deficit  Motor: No weakness  Gait: Gait normal    Psychiatric:         Mood and Affect: Mood normal          Behavior: Behavior normal          Thought Content:  Thought content normal          Judgment: Judgment normal            No results found for:   Lab Results   Component Value Date    WBC 4 00 (L) 05/19/2016    HGB 11 9 05/19/2016    HCT 38 5 05/19/2016    MCV 75 (L) 05/19/2016     05/19/2016     Lab Results   Component Value Date    K 4 1 05/19/2016     05/19/2016    CO2 28 05/19/2016    BUN 10 11/25/2019    CREATININE 0 68 11/25/2019    CALCIUM 9 2 05/19/2016    AST 41 04/21/2016    ALT 45 04/21/2016    ALKPHOS 55 04/21/2016    EGFR 92 11/25/2019        Trend:  No results found for:

## 2021-09-12 ENCOUNTER — APPOINTMENT (EMERGENCY)
Dept: RADIOLOGY | Facility: HOSPITAL | Age: 66
DRG: 247 | End: 2021-09-12
Payer: MEDICARE

## 2021-09-12 ENCOUNTER — HOSPITAL ENCOUNTER (INPATIENT)
Facility: HOSPITAL | Age: 66
LOS: 2 days | Discharge: HOME | DRG: 247 | End: 2021-09-14
Attending: EMERGENCY MEDICINE | Admitting: INTERNAL MEDICINE
Payer: MEDICARE

## 2021-09-12 DIAGNOSIS — I16.0 HYPERTENSIVE URGENCY: ICD-10-CM

## 2021-09-12 DIAGNOSIS — R07.9 CHEST PAIN AT REST: ICD-10-CM

## 2021-09-12 DIAGNOSIS — R77.8 ELEVATED TROPONIN: ICD-10-CM

## 2021-09-12 DIAGNOSIS — I21.4 NSTEMI (NON-ST ELEVATED MYOCARDIAL INFARCTION) (HCC): Primary | ICD-10-CM

## 2021-09-12 PROBLEM — R79.89 ELEVATED TROPONIN: Status: ACTIVE | Noted: 2021-09-12

## 2021-09-12 PROBLEM — R07.89 CHEST PAIN, ATYPICAL: Status: ACTIVE | Noted: 2021-09-12

## 2021-09-12 LAB
ALBUMIN SERPL BCP-MCNC: 4 G/DL (ref 3.5–5)
ALP SERPL-CCNC: 63 U/L (ref 46–116)
ALT SERPL W P-5'-P-CCNC: 38 U/L (ref 12–78)
ANION GAP SERPL CALCULATED.3IONS-SCNC: 10 MMOL/L (ref 4–13)
APTT PPP: 26 SECONDS (ref 23–37)
AST SERPL W P-5'-P-CCNC: 47 U/L (ref 5–45)
BASOPHILS # BLD AUTO: 0.05 THOUSANDS/ΜL (ref 0–0.1)
BASOPHILS NFR BLD AUTO: 1 % (ref 0–1)
BILIRUB SERPL-MCNC: 0.25 MG/DL (ref 0.2–1)
BUN SERPL-MCNC: 10 MG/DL (ref 5–25)
CALCIUM SERPL-MCNC: 9.7 MG/DL (ref 8.3–10.1)
CHLORIDE SERPL-SCNC: 106 MMOL/L (ref 100–108)
CO2 SERPL-SCNC: 26 MMOL/L (ref 21–32)
CREAT SERPL-MCNC: 0.68 MG/DL (ref 0.6–1.3)
D DIMER PPP FEU-MCNC: 1.17 UG/ML FEU
EOSINOPHIL # BLD AUTO: 0.1 THOUSAND/ΜL (ref 0–0.61)
EOSINOPHIL NFR BLD AUTO: 1 % (ref 0–6)
ERYTHROCYTE [DISTWIDTH] IN BLOOD BY AUTOMATED COUNT: 15.9 % (ref 11.6–15.1)
GFR SERPL CREATININE-BSD FRML MDRD: 91 ML/MIN/1.73SQ M
GLUCOSE SERPL-MCNC: 86 MG/DL (ref 65–140)
HCT VFR BLD AUTO: 38.9 % (ref 34.8–46.1)
HGB BLD-MCNC: 12 G/DL (ref 11.5–15.4)
IMM GRANULOCYTES # BLD AUTO: 0.04 THOUSAND/UL (ref 0–0.2)
IMM GRANULOCYTES NFR BLD AUTO: 1 % (ref 0–2)
INR PPP: 0.95 (ref 0.84–1.19)
LYMPHOCYTES # BLD AUTO: 1.97 THOUSANDS/ΜL (ref 0.6–4.47)
LYMPHOCYTES NFR BLD AUTO: 29 % (ref 14–44)
MCH RBC QN AUTO: 24.3 PG (ref 26.8–34.3)
MCHC RBC AUTO-ENTMCNC: 30.8 G/DL (ref 31.4–37.4)
MCV RBC AUTO: 79 FL (ref 82–98)
MONOCYTES # BLD AUTO: 0.64 THOUSAND/ΜL (ref 0.17–1.22)
MONOCYTES NFR BLD AUTO: 9 % (ref 4–12)
NEUTROPHILS # BLD AUTO: 4.11 THOUSANDS/ΜL (ref 1.85–7.62)
NEUTS SEG NFR BLD AUTO: 59 % (ref 43–75)
NRBC BLD AUTO-RTO: 0 /100 WBCS
NT-PROBNP SERPL-MCNC: 366 PG/ML
PLATELET # BLD AUTO: 287 THOUSANDS/UL (ref 149–390)
PMV BLD AUTO: 10.1 FL (ref 8.9–12.7)
POTASSIUM SERPL-SCNC: 4.2 MMOL/L (ref 3.5–5.3)
PROT SERPL-MCNC: 7.3 G/DL (ref 6.4–8.2)
PROTHROMBIN TIME: 12.7 SECONDS (ref 11.6–14.5)
RBC # BLD AUTO: 4.94 MILLION/UL (ref 3.81–5.12)
SODIUM SERPL-SCNC: 142 MMOL/L (ref 136–145)
TROPONIN I SERPL-MCNC: 13.36 NG/ML
TROPONIN I SERPL-MCNC: 3.04 NG/ML
TROPONIN I SERPL-MCNC: 7.25 NG/ML
TSH SERPL DL<=0.05 MIU/L-ACNC: 6.67 UIU/ML (ref 0.36–3.74)
WBC # BLD AUTO: 6.91 THOUSAND/UL (ref 4.31–10.16)

## 2021-09-12 PROCEDURE — 85025 COMPLETE CBC W/AUTO DIFF WBC: CPT | Performed by: EMERGENCY MEDICINE

## 2021-09-12 PROCEDURE — 84443 ASSAY THYROID STIM HORMONE: CPT

## 2021-09-12 PROCEDURE — 36415 COLL VENOUS BLD VENIPUNCTURE: CPT

## 2021-09-12 PROCEDURE — 83880 ASSAY OF NATRIURETIC PEPTIDE: CPT

## 2021-09-12 PROCEDURE — 83036 HEMOGLOBIN GLYCOSYLATED A1C: CPT | Performed by: NURSE PRACTITIONER

## 2021-09-12 PROCEDURE — 85379 FIBRIN DEGRADATION QUANT: CPT

## 2021-09-12 PROCEDURE — 99291 CRITICAL CARE FIRST HOUR: CPT | Performed by: EMERGENCY MEDICINE

## 2021-09-12 PROCEDURE — 83735 ASSAY OF MAGNESIUM: CPT | Performed by: NURSE PRACTITIONER

## 2021-09-12 PROCEDURE — 99285 EMERGENCY DEPT VISIT HI MDM: CPT

## 2021-09-12 PROCEDURE — 85730 THROMBOPLASTIN TIME PARTIAL: CPT | Performed by: EMERGENCY MEDICINE

## 2021-09-12 PROCEDURE — 80053 COMPREHEN METABOLIC PANEL: CPT | Performed by: EMERGENCY MEDICINE

## 2021-09-12 PROCEDURE — 99223 1ST HOSP IP/OBS HIGH 75: CPT | Performed by: INTERNAL MEDICINE

## 2021-09-12 PROCEDURE — 85610 PROTHROMBIN TIME: CPT | Performed by: INTERNAL MEDICINE

## 2021-09-12 PROCEDURE — 93005 ELECTROCARDIOGRAM TRACING: CPT

## 2021-09-12 PROCEDURE — 84484 ASSAY OF TROPONIN QUANT: CPT | Performed by: INTERNAL MEDICINE

## 2021-09-12 PROCEDURE — 1124F ACP DISCUSS-NO DSCNMKR DOCD: CPT | Performed by: EMERGENCY MEDICINE

## 2021-09-12 PROCEDURE — 71046 X-RAY EXAM CHEST 2 VIEWS: CPT

## 2021-09-12 PROCEDURE — 84484 ASSAY OF TROPONIN QUANT: CPT

## 2021-09-12 PROCEDURE — 84484 ASSAY OF TROPONIN QUANT: CPT | Performed by: EMERGENCY MEDICINE

## 2021-09-12 RX ORDER — LABETALOL 20 MG/4 ML (5 MG/ML) INTRAVENOUS SYRINGE
5 ONCE
Status: DISCONTINUED | OUTPATIENT
Start: 2021-09-12 | End: 2021-09-12

## 2021-09-12 RX ORDER — HEPARIN SODIUM 1000 [USP'U]/ML
2000 INJECTION, SOLUTION INTRAVENOUS; SUBCUTANEOUS
Status: DISCONTINUED | OUTPATIENT
Start: 2021-09-12 | End: 2021-09-14 | Stop reason: HOSPADM

## 2021-09-12 RX ORDER — LABETALOL 20 MG/4 ML (5 MG/ML) INTRAVENOUS SYRINGE
5 EVERY 6 HOURS PRN
Status: DISCONTINUED | OUTPATIENT
Start: 2021-09-12 | End: 2021-09-14 | Stop reason: HOSPADM

## 2021-09-12 RX ORDER — HEPARIN SODIUM 1000 [USP'U]/ML
4000 INJECTION, SOLUTION INTRAVENOUS; SUBCUTANEOUS
Status: DISCONTINUED | OUTPATIENT
Start: 2021-09-12 | End: 2021-09-14 | Stop reason: HOSPADM

## 2021-09-12 RX ORDER — HEPARIN SODIUM 10000 [USP'U]/100ML
3-20 INJECTION, SOLUTION INTRAVENOUS
Status: DISCONTINUED | OUTPATIENT
Start: 2021-09-12 | End: 2021-09-14 | Stop reason: HOSPADM

## 2021-09-12 RX ORDER — ASPIRIN 81 MG/1
324 TABLET, CHEWABLE ORAL ONCE
Status: COMPLETED | OUTPATIENT
Start: 2021-09-12 | End: 2021-09-12

## 2021-09-12 RX ORDER — NITROGLYCERIN 0.4 MG/1
0.4 TABLET SUBLINGUAL
Status: DISCONTINUED | OUTPATIENT
Start: 2021-09-12 | End: 2021-09-14 | Stop reason: HOSPADM

## 2021-09-12 RX ORDER — MORPHINE SULFATE 4 MG/ML
4 INJECTION, SOLUTION INTRAMUSCULAR; INTRAVENOUS ONCE
Status: COMPLETED | OUTPATIENT
Start: 2021-09-12 | End: 2021-09-12

## 2021-09-12 RX ORDER — HEPARIN SODIUM 1000 [USP'U]/ML
4000 INJECTION, SOLUTION INTRAVENOUS; SUBCUTANEOUS ONCE
Status: COMPLETED | OUTPATIENT
Start: 2021-09-12 | End: 2021-09-12

## 2021-09-12 RX ORDER — FERROUS SULFATE 325(65) MG
325 TABLET ORAL
COMMUNITY

## 2021-09-12 RX ADMIN — HEPARIN SODIUM 12 UNITS/KG/HR: 10000 INJECTION, SOLUTION INTRAVENOUS at 18:39

## 2021-09-12 RX ADMIN — MORPHINE SULFATE 4 MG: 4 INJECTION INTRAVENOUS at 18:18

## 2021-09-12 RX ADMIN — NITROGLYCERIN 1 INCH: 20 OINTMENT TOPICAL at 19:37

## 2021-09-12 RX ADMIN — HEPARIN SODIUM 4000 UNITS: 1000 INJECTION INTRAVENOUS; SUBCUTANEOUS at 18:42

## 2021-09-12 RX ADMIN — METOPROLOL TARTRATE 25 MG: 25 TABLET, FILM COATED ORAL at 20:51

## 2021-09-12 RX ADMIN — ASPIRIN 324 MG: 81 TABLET, CHEWABLE ORAL at 18:16

## 2021-09-12 RX ADMIN — TICAGRELOR 180 MG: 90 TABLET ORAL at 18:18

## 2021-09-12 NOTE — ED PROVIDER NOTES
History  Chief Complaint   Patient presents with    Chest Pain     Started last night, radiated to left arm this AM  Took tums without relief  Marilee Moffett is a previously healthy 79 yo F who arrived to the ED this evening for evalutation of chest pain  She reports that it began last night before bed  She thought it was heartburn and took two Tums with no relief  Woke up this morning and pain persisted  Described as suprasternal, burning pain  Radiates to L arm  Initially 10/10, now 3/10  Improves with leaning forward  Denies SOB, fever, chills, N/V, abdominal pain, recent illness  Pt also reports a recent episode of leg pain yesterday after a bus trip on Friday where she was sedentary for several hours at a time  Leg pain resolved today  Prior to Admission Medications   Prescriptions Last Dose Informant Patient Reported?  Taking?   ferrous sulfate 325 (65 Fe) mg tablet More than a month at Unknown time  Yes No   Sig: Take 325 mg by mouth      Facility-Administered Medications: None       Past Medical History:   Diagnosis Date    Chronic kidney disease        Past Surgical History:   Procedure Laterality Date    HYSTERECTOMY  10/23/2016    Dr Celsa Boone &INDWELL STENT INSRT Left 6/1/2016    Procedure: CYSTOSCOPY; URETEROSCOPY; HOLMIUM LASER LITHOTRIPSY; BASKET STONE EXTRACTION; RETROGRADE PYELOGRAM; STENT PLACEMENT ;  Surgeon: Bianca Herrera MD;  [facility];  Service: Urology    TX CYSTO/URETERO W/LITHOTRIPSY &INDWELL STENT INSRT Left 4/22/2016    Procedure: Catherine White;  Surgeon: Bianca Herrera MD;  [facility];  Service: Urology    TUBAL LIGATION         Family History   Problem Relation Age of Onset    No Known Problems Mother     No Known Problems Father     No Known Problems Sister     No Known Problems Maternal Grandmother     No Known Problems Maternal Grandfather     No Known Problems Paternal Grandmother     No Known Problems Paternal Grandfather     No Known Problems Maternal Aunt     No Known Problems Maternal Aunt     No Known Problems Maternal Aunt     No Known Problems Maternal Aunt     No Known Problems Paternal Aunt      I have reviewed and agree with the history as documented  E-Cigarette/Vaping     E-Cigarette/Vaping Substances     Social History     Tobacco Use    Smoking status: Never Smoker    Smokeless tobacco: Never Used   Substance Use Topics    Alcohol use: No    Drug use: No        Review of Systems   Constitutional: Negative for chills and fever  HENT: Negative for ear pain and sore throat  Eyes: Negative for pain and visual disturbance  Respiratory: Negative for cough and shortness of breath  Cardiovascular: Positive for chest pain  Negative for palpitations  Gastrointestinal: Negative for abdominal pain, nausea and vomiting  Endocrine: Negative  Genitourinary: Negative for dysuria, flank pain and hematuria  Musculoskeletal: Negative for arthralgias, back pain and neck pain  Skin: Negative for color change and rash  Allergic/Immunologic: Negative  Neurological: Negative for seizures and syncope  Hematological: Negative  Psychiatric/Behavioral: Negative  All other systems reviewed and are negative  Physical Exam  ED Triage Vitals [09/12/21 1626]   Temperature Pulse Respirations Blood Pressure SpO2   97 9 °F (36 6 °C) 90 18 (!) 203/100 97 %      Temp Source Heart Rate Source Patient Position - Orthostatic VS BP Location FiO2 (%)   Oral Monitor Sitting Left arm --      Pain Score       3             Orthostatic Vital Signs  Vitals:    09/12/21 1830 09/12/21 1940 09/12/21 2000 09/12/21 2030   BP: (!) 194/95 (!) 179/81 (!) 187/82 164/79   Pulse: 64 71 72 66   Patient Position - Orthostatic VS: Sitting Sitting Sitting Lying       Physical Exam  Vitals and nursing note reviewed     Constitutional:       General: She is not in acute distress  Appearance: She is well-developed  HENT:      Head: Normocephalic and atraumatic  Eyes:      Extraocular Movements: Extraocular movements intact  Conjunctiva/sclera: Conjunctivae normal    Cardiovascular:      Rate and Rhythm: Normal rate and regular rhythm  Heart sounds: Normal heart sounds  No murmur heard  Pulmonary:      Effort: Pulmonary effort is normal  No respiratory distress  Breath sounds: Normal breath sounds  Chest:      Chest wall: No tenderness  Abdominal:      General: Bowel sounds are normal       Palpations: Abdomen is soft  Tenderness: There is no abdominal tenderness  Musculoskeletal:      Cervical back: Neck supple  Right lower leg: No edema  Left lower leg: No edema  Skin:     General: Skin is warm and dry  Capillary Refill: Capillary refill takes less than 2 seconds  Neurological:      General: No focal deficit present  Mental Status: She is alert and oriented to person, place, and time     Psychiatric:         Mood and Affect: Mood normal          Behavior: Behavior normal          ED Medications  Medications   nitroglycerin (NITROSTAT) SL tablet 0 4 mg (has no administration in time range)   metoprolol tartrate (LOPRESSOR) tablet 25 mg (has no administration in time range)   heparin (porcine) 25,000 units in 0 45% NaCl 250 mL infusion (premix) (12 Units/kg/hr × 75 kg (Order-Specific) Intravenous New Bag 9/12/21 1839)   heparin (porcine) injection 4,000 Units (has no administration in time range)   heparin (porcine) injection 2,000 Units (has no administration in time range)   Labetalol HCl (NORMODYNE) injection 5 mg (has no administration in time range)   aspirin chewable tablet 324 mg (324 mg Oral Given 9/12/21 1816)   morphine (PF) 4 mg/mL injection 4 mg (4 mg Intravenous Given 9/12/21 1818)   heparin (porcine) injection 4,000 Units (4,000 Units Intravenous Given 9/12/21 1842)   ticagrelor (BRILINTA) tablet 180 mg (180 mg Oral Given 9/12/21 1818)   nitroglycerin (NITRO-BID) 2 % TD ointment 1 inch (1 inch Topical Given 9/12/21 1937)       Diagnostic Studies  Results Reviewed     Procedure Component Value Units Date/Time    Troponin I [[id number]]  (Abnormal) Collected: 09/12/21 1940    Lab Status: Final result Specimen: Blood from Arm, Left Updated: 09/12/21 2019     Troponin I 7 25 ng/mL     Troponin I [[id number]]     Lab Status: No result Specimen: Blood     Protime-INR [[id number]]  (Normal) Collected: 09/12/21 1634    Lab Status: Final result Specimen: Blood from Arm, Right Updated: 09/12/21 1855     Protime 12 7 seconds      INR 0 95    APTT six (6) hours after Heparin bolus/drip initiation or dosing change [[id number]]  (Normal) Collected: 09/12/21 1808    Lab Status: Final result Specimen: Blood from Arm, Right Updated: 09/12/21 1845     PTT 26 seconds     NT-BNP PRO [[id number]]  (Abnormal) Collected: 09/12/21 1634    Lab Status: Final result Specimen: Blood from Arm, Right Updated: 09/12/21 1731     NT-proBNP 366 pg/mL     D-dimer, quantitative [[id number]]  (Abnormal) Collected: 09/12/21 1634    Lab Status: Final result Specimen: Blood from Arm, Right Updated: 09/12/21 1722     D-Dimer, Quant 1 17 ug/ml FEU     Comprehensive metabolic panel [[id number]]  (Abnormal) Collected: 09/12/21 1634    Lab Status: Final result Specimen: Blood from Arm, Right Updated: 09/12/21 1719     Sodium 142 mmol/L      Potassium 4 2 mmol/L      Chloride 106 mmol/L      CO2 26 mmol/L      ANION GAP 10 mmol/L      BUN 10 mg/dL      Creatinine 0 68 mg/dL      Glucose 86 mg/dL      Calcium 9 7 mg/dL      AST 47 U/L      ALT 38 U/L      Alkaline Phosphatase 63 U/L      Total Protein 7 3 g/dL      Albumin 4 0 g/dL      Total Bilirubin 0 25 mg/dL      eGFR 91 ml/min/1 73sq m     Narrative:      Etienne guidelines for Chronic Kidney Disease (CKD):     Stage 1 with normal or high GFR (GFR > 90 mL/min/1 73 square meters)    Stage 2 Mild CKD (GFR = 60-89 mL/min/1 73 square meters)    Stage 3A Moderate CKD (GFR = 45-59 mL/min/1 73 square meters)    Stage 3B Moderate CKD (GFR = 30-44 mL/min/1 73 square meters)    Stage 4 Severe CKD (GFR = 15-29 mL/min/1 73 square meters)    Stage 5 End Stage CKD (GFR <15 mL/min/1 73 square meters)  Note: GFR calculation is accurate only with a steady state creatinine    Troponin I [[id number]]  (Abnormal) Collected: 09/12/21 1634    Lab Status: Final result Specimen: Blood from Arm, Right Updated: 09/12/21 1705     Troponin I 3 04 ng/mL     CBC and differential [[id number]]  (Abnormal) Collected: 09/12/21 1634    Lab Status: Final result Specimen: Blood from Arm, Right Updated: 09/12/21 1640     WBC 6 91 Thousand/uL      RBC 4 94 Million/uL      Hemoglobin 12 0 g/dL      Hematocrit 38 9 %      MCV 79 fL      MCH 24 3 pg      MCHC 30 8 g/dL      RDW 15 9 %      MPV 10 1 fL      Platelets 232 Thousands/uL      nRBC 0 /100 WBCs      Neutrophils Relative 59 %      Immat GRANS % 1 %      Lymphocytes Relative 29 %      Monocytes Relative 9 %      Eosinophils Relative 1 %      Basophils Relative 1 %      Neutrophils Absolute 4 11 Thousands/µL      Immature Grans Absolute 0 04 Thousand/uL      Lymphocytes Absolute 1 97 Thousands/µL      Monocytes Absolute 0 64 Thousand/µL      Eosinophils Absolute 0 10 Thousand/µL      Basophils Absolute 0 05 Thousands/µL                  XR chest 2 views   ED Interpretation by Maris Curry MD (09/12 1723)   Nad - no cahgne from previous            Procedures  ECG 12 Lead Documentation Only    Date/Time: 9/12/2021 5:28 PM  Performed by: Mckinley Neville MD  Authorized by: Mckinley Neville MD     Indications / Diagnosis:  Chest pain  ECG reviewed by me, the ED Provider: yes    Patient location:  ED  Previous ECG:     Previous ECG:  Compared to current    Comparison ECG info:  Sinus bradycardia, Incomplete RBBB, LVH with repolarization abnormality    Similarity: Changes noted  Interpretation:     Interpretation: abnormal    Rate:     ECG rate:  67    ECG rate assessment: normal    Rhythm:     Rhythm: sinus rhythm    Ectopy:     Ectopy: none    QRS:     QRS axis:  Left    QRS intervals:  Normal  Conduction:     Conduction: abnormal      Abnormal conduction: incomplete RBBB    ST segments:     ST segments:  Non-specific  T waves:     T waves: inverted      Inverted:  I, aVL, V2, V5, V6, V4 and V3  Other findings:     Other findings: LVH            ED Course  ED Course as of Sep 12 2035   Sun Sep 12, 2021   1626 Blood Pressure(!): 203/100   1634 NT-BNP PRO(!)   1634 D-Dimer, Quant(!): 1 17   1717 Troponin I(!): 3 04   1719 CXR complete      1940 Troponin I(!): 7 25             HEART Risk Score      Most Recent Value   Heart Score Risk Calculator   History  2 Filed at: 09/12/2021 2023   ECG  1 Filed at: 09/12/2021 2023   Age  2 Filed at: 09/12/2021 2023   Risk Factors  0 Filed at: 09/12/2021 2023   Troponin  2 Filed at: 09/12/2021 2023   HEART Score  7 Filed at: 09/12/2021 2023                      SBIRT 20yo+      Most Recent Value   SBIRT (25 yo +)   In order to provide better care to our patients, we are screening all of our patients for alcohol and drug use  Would it be okay to ask you these screening questions? Yes Filed at: 09/12/2021 1627   Initial Alcohol Screen: US AUDIT-C    1  How often do you have a drink containing alcohol?  0 Filed at: 09/12/2021 1627   2  How many drinks containing alcohol do you have on a typical day you are drinking? 0 Filed at: 09/12/2021 1627   3a  Male UNDER 65: How often do you have five or more drinks on one occasion? 0 Filed at: 09/12/2021 1627   3b  FEMALE Any Age, or MALE 65+: How often do you have 4 or more drinks on one occassion? 0 Filed at: 09/12/2021 1627   Audit-C Score  0 Filed at: 09/12/2021 1627   CONSUELO: How many times in the past year have you       Used an illegal drug or used a prescription medication for non-medical reasons? Never Filed at: 09/12/2021 1627        SERGEY Risk Score      Most Recent Value   Age >= 72  1 Filed at: 09/12/2021 1933   Known CAD (stenosis >= 50%)  0 Filed at: 09/12/2021 1933   Recent (<=24 hrs) Service Angina  1 Filed at: 09/12/2021 1933   ST Deviation >= 0 5 mm  0 Filed at: 09/12/2021 1933   3+ CAD Risk Factors (FHx, HTN, HLP, DM, Smoker)  1 Filed at: 09/12/2021 1933   Aspirin Use Past 7 Days  0 Filed at: 09/12/2021 1933   Elevated Cardiac Markers  1 Filed at: 09/12/2021 1933   SERGEY Risk Score (Calculated)  4 Filed at: 09/12/2021 1933              MDM  Number of Diagnoses or Management Options  NSTEMI (non-ST elevated myocardial infarction) [facility]): new and requires workup  Diagnosis management comments: Pt arrived to ED this evening for evaluation of chest pain  Started last night, no response to Tums x2  Radiates to L arm  Described pain as burning, 10/10 that radiates to L arm  Improves with leaning forward  DDX including but not limited to: ACS, MI, PE, PTX, pneumonia, dissection, pleurisy, pericarditis, myocarditis, rhabdomyolysis, GI etiology  Initial EKG similar to previous, showed incomplete RBBB and LVH, no ST elevations  Trops + 3 04  ACS likely  CXR negative  Discussed with ED attending  Pt will be admitted to [place] [id number] for management of NSTEMI          Amount and/or Complexity of Data Reviewed  Clinical lab tests: ordered and reviewed  Tests in the radiology section of CPT®: ordered and reviewed  Tests in the medicine section of CPT®: ordered and reviewed  Review and summarize past medical records: yes  Discuss the patient with other providers: yes  Independent visualization of images, tracings, or specimens: yes    Risk of Complications, Morbidity, and/or Mortality  Presenting problems: high  Diagnostic procedures: low  Management options: low    Patient Progress  Patient progress: stable      Disposition  Final diagnoses:   NSTEMI (non-ST elevated myocardial infarction) ([facility] [address] )     Time reflects when diagnosis was documented in both MDM as applicable and the Disposition within this note     Time User Action Codes Description Comment    9/12/2021  5:42 PM Jasmin Beckford Add [I21 4] NSTEMI (non-ST elevated myocardial infarction) ([facility] [address] )     9/12/2021  7:37 PM Classie Murders Add [R07 9] Chest pain at rest     9/12/2021  7:37 PM Classie Murders Add [I16 0] Hypertensive urgency     9/12/2021  7:37 PM Classie Murders Add [R77 8] Elevated troponin       ED Disposition     ED Disposition Condition Date/Time Comment    Admit Stable Sun Sep 12, 2021  5:42 PM Case was discussed with dr Johana Gonzalez and the patient's admission status was agreed to be Admission Status: inpatient status to the service of Dr Johana Gonzalez   Follow-up Information    None         Patient's Medications   Discharge Prescriptions    No medications on file     No discharge procedures on file  PDMP Review     None           ED Provider  Attending physically available and evaluated Meño Ventura I managed the patient along with the ED Attending      Electronically Signed by         Aba Verde MD  09/12/21 2036

## 2021-09-12 NOTE — H&P
[address] 1955, 78 y o  female MRN: [id number]  Unit/Bed#: ED 07 Encounter: [id number]  Primary Care Provider: Vince Dewey MD   Date and time admitted to hospital: 9/12/2021  4:39 PM    * Chest pain, atypical  Assessment & Plan  Presents with chest pain that started the previous night was progressively getting worse  At peak pain was 10/10 with radiation down left arm  EKG similar to prior  Troponin elevated concerning for ACS  ASA, Brilinta given in the ED  Heart score of 8 points  SERGEY score 5  Plan  · Trend troponins  · Monitor on telemetry  · Heparin drip per protocol  · Consult inpatient cardiology  Elevated troponin  Assessment & Plan  Lab Results   Component Value Date    TROPONINI 7 25 (H) 09/12/2021    TROPONINI 3 04 (H) 09/12/2021    TROPONINI <0 02 04/23/2016   · On presentation troponin 3 04  · Trend troponin  Hypertensive urgency  Assessment & Plan  Plan  · Labetalol 5 mg prn Q 6 hours for systolic blood pressure higher than 180  · Monitor V/S per protocol  History of endometrial cancer  Assessment & Plan  · History of endometrial cancer FIGO stage I A status post hysterectomy  · Stable continue outpatient surveillance  VTE Prophylaxis: Heparin  / sequential compression device   Code Status:  Level 1  POLST: There is no POLST form on file for this patient (pre-hospital)    Anticipated Length of Stay:  Patient will be admitted on an Inpatient basis with an anticipated length of stay of  greater than 2 midnights  Justification for Hospital Stay:  Atypical chest pain  Chief Complaint:  Chest pain at rest     History of Present Illness:    Wes Taylor is a 78 y o  female with PMH endometrial cancer s/p hysterectomy, nephrolithiasis, hyperlipidemia who presents with substernal chest pain at rest   Patient reports that previous day she went out on a whole day bus tour with her  for wine tasting    Patient was not sitting for long periods of time as they made several stops and walked  Chest pain began when they returned home, initially she thought it was heartburn therefore she took 2 anti acid tablets with no relief, howeever was able to get some sleep overnight  She woke up today in the morning with pain which progressively got worse  The chest pain was better with leaning forward  Nothing seemed to worsen the pain  Pain was 10/10 at its peak with radiation down left arm prompting her to present to the ED  In ED EKG significant for non specific ST depression but similar to prior EKG in 2016  Elevated troponin  When seen chest pain has resolved  Denies shortness of breath, denies nausea/vomiting/abdominal pain, denies fevers, chills  Review of Systems:    Review of Systems   Constitutional: Negative for chills and fever  HENT: Negative for ear pain and sore throat  Eyes: Negative for pain and visual disturbance  Respiratory: Negative for cough and shortness of breath  Cardiovascular: Negative for chest pain and palpitations  Gastrointestinal: Negative for abdominal pain and vomiting  Genitourinary: Negative for dysuria and hematuria  Musculoskeletal: Negative for arthralgias and back pain  Skin: Negative for color change and rash  Neurological: Negative for seizures and syncope  All other systems reviewed and are negative        Past Medical and Surgical History:     Past Medical History:   Diagnosis Date    Chronic kidney disease        Past Surgical History:   Procedure Laterality Date    HYSTERECTOMY  10/23/2016    Dr Gini Allison &INDWELL STENT INSRT Left 6/1/2016    Procedure: CYSTOSCOPY; URETEROSCOPY; HOLMIUM LASER LITHOTRIPSY; BASKET STONE EXTRACTION; RETROGRADE PYELOGRAM; STENT PLACEMENT ;  Surgeon: Effie Goldstein MD;  [facility];  Service: Urology    [place] CYSTO/URETERO W/LITHOTRIPSY &INDWELL STENT INSRT Left 4/22/2016    Procedure: Erroll Paula STENT URETERAL;  Surgeon: Deo Walls MD;  [facility];  Service: Urology    TUBAL LIGATION         Meds/Allergies:    Prior to Admission medications    Medication Sig Start Date End Date Taking? Authorizing Provider   ferrous sulfate 325 (65 Fe) mg tablet Take 325 mg by mouth    Historical Provider, MD     I have reviewed home medications with patient personally  Allergies: No Known Allergies    Social History:     Marital Status: /Civil Union   Occupation:  Retired  Patient Pre-hospital Living Situation: At home with   Patient Pre-hospital Level of Mobility: Independent ambulation  Patient Pre-hospital Diet Restrictions: None  Substance Use History:   Social History     Substance and Sexual Activity   Alcohol Use No     Social History     Tobacco Use   Smoking Status Never Smoker   Smokeless Tobacco Never Used     Social History     Substance and Sexual Activity   Drug Use No       Family History:    Family History   Problem Relation Age of Onset    No Known Problems Mother     No Known Problems Father     No Known Problems Sister     No Known Problems Maternal Grandmother     No Known Problems Maternal Grandfather     No Known Problems Paternal Grandmother     No Known Problems Paternal Grandfather     No Known Problems Maternal Aunt     No Known Problems Maternal Aunt     No Known Problems Maternal Aunt     No Known Problems Maternal Aunt     No Known Problems Paternal Aunt        Physical Exam:     Vitals:   Blood Pressure: 140/64 (09/12/21 2230)  Pulse: 72 (09/12/21 2230)  Temperature: 97 9 °F (36 6 °C) (09/12/21 1626)  Temp Source: Oral (09/12/21 1626)  Respirations: 20 (09/12/21 2230)  Height: 5' 3" (160 cm) (09/12/21 1626)  Weight - Scale: 78 5 kg (173 lb) (09/12/21 1832)  SpO2: 97 % (09/12/21 2230)    Physical Exam  Constitutional:       General: She is not in acute distress  Appearance: Normal appearance   She is not ill-appearing or diaphoretic  HENT:      Head: Normocephalic and atraumatic  Nose: Nose normal  No congestion or rhinorrhea  Mouth/Throat:      Mouth: Mucous membranes are moist       Pharynx: No oropharyngeal exudate or posterior oropharyngeal erythema  Eyes:      General: No scleral icterus  Right eye: No discharge  Left eye: No discharge  Pupils: Pupils are equal, round, and reactive to light  Cardiovascular:      Rate and Rhythm: Normal rate and regular rhythm  Pulses: Normal pulses  Heart sounds: Normal heart sounds  No murmur heard  No gallop  Pulmonary:      Effort: Pulmonary effort is normal       Breath sounds: Normal breath sounds  No wheezing, rhonchi or rales  Abdominal:      General: Bowel sounds are normal  There is no distension  Palpations: Abdomen is soft  Tenderness: There is no abdominal tenderness  There is no guarding or rebound  Musculoskeletal:         General: No swelling or tenderness  Normal range of motion  Cervical back: Normal range of motion and neck supple  No rigidity or tenderness  Right lower leg: No edema  Left lower leg: No edema  Lymphadenopathy:      Cervical: No cervical adenopathy  Skin:     General: Skin is warm and dry  Capillary Refill: Capillary refill takes less than 2 seconds  Coloration: Skin is not jaundiced or pale  Findings: No lesion or rash  Neurological:      General: No focal deficit present  Mental Status: She is alert and oriented to person, place, and time  Cranial Nerves: No cranial nerve deficit  Sensory: No sensory deficit  Motor: No weakness  Psychiatric:         Mood and Affect: Mood normal          Behavior: Behavior normal              Additional Data:     Lab Results: I have personally reviewed pertinent reports        Results from last 7 days   Lab Units 09/12/21  1634   WBC Thousand/uL 6 91   HEMOGLOBIN g/dL 12 0   HEMATOCRIT % 38 9   PLATELETS Thousands/uL 287   NEUTROS PCT % 59   LYMPHS PCT % 29   MONOS PCT % 9   EOS PCT % 1     Results from last 7 days   Lab Units 09/12/21  1634   POTASSIUM mmol/L 4 2   CHLORIDE mmol/L 106   CO2 mmol/L 26   BUN mg/dL 10   CREATININE mg/dL 0 68   CALCIUM mg/dL 9 7   ALK PHOS U/L 63   ALT U/L 38   AST U/L 47*     Results from last 7 days   Lab Units 09/12/21  1634   INR  0 95       Imaging: I have personally reviewed pertinent reports  No results found  EKG, Pathology, and Other Studies Reviewed on Admission:   · EKG: NSR  ST depressions  Epic / Care Everywhere Records Reviewed: Yes     ** Please Note: This note has been constructed using a voice recognition system   **

## 2021-09-13 ENCOUNTER — APPOINTMENT (INPATIENT)
Dept: NON INVASIVE DIAGNOSTICS | Facility: HOSPITAL | Age: 66
DRG: 247 | End: 2021-09-13
Payer: MEDICARE

## 2021-09-13 LAB
APTT PPP: 35 SECONDS (ref 23–37)
APTT PPP: 55 SECONDS (ref 23–37)
ATRIAL RATE: 66 BPM
ATRIAL RATE: 70 BPM
ATRIAL RATE: 73 BPM
EST. AVERAGE GLUCOSE BLD GHB EST-MCNC: 137 MG/DL
HBA1C MFR BLD: 6.4 %
INR PPP: 1.12 (ref 0.84–1.19)
KCT BLD-ACNC: 280 SEC (ref 89–137)
MAGNESIUM SERPL-MCNC: 2.1 MG/DL (ref 1.6–2.6)
P AXIS: 56 DEGREES
P AXIS: 59 DEGREES
P AXIS: 61 DEGREES
PR INTERVAL: 150 MS
PR INTERVAL: 158 MS
PR INTERVAL: 166 MS
PROTHROMBIN TIME: 14.4 SECONDS (ref 11.6–14.5)
QRS AXIS: -2 DEGREES
QRS AXIS: 28 DEGREES
QRS AXIS: 6 DEGREES
QRSD INTERVAL: 100 MS
QRSD INTERVAL: 102 MS
QRSD INTERVAL: 108 MS
QT INTERVAL: 392 MS
QT INTERVAL: 414 MS
QT INTERVAL: 446 MS
QTC INTERVAL: 414 MS
QTC INTERVAL: 447 MS
QTC INTERVAL: 467 MS
SPECIMEN SOURCE: ABNORMAL
T WAVE AXIS: 141 DEGREES
T WAVE AXIS: 141 DEGREES
T WAVE AXIS: 166 DEGREES
TROPONIN I SERPL-MCNC: 11.01 NG/ML
TROPONIN I SERPL-MCNC: 13.78 NG/ML
TROPONIN I SERPL-MCNC: 16.01 NG/ML
VENTRICULAR RATE: 66 BPM
VENTRICULAR RATE: 67 BPM
VENTRICULAR RATE: 70 BPM

## 2021-09-13 PROCEDURE — 93306 TTE W/DOPPLER COMPLETE: CPT

## 2021-09-13 PROCEDURE — C1894 INTRO/SHEATH, NON-LASER: HCPCS | Performed by: NURSE PRACTITIONER

## 2021-09-13 PROCEDURE — C1725 CATH, TRANSLUMIN NON-LASER: HCPCS | Performed by: NURSE PRACTITIONER

## 2021-09-13 PROCEDURE — 92928 PRQ TCAT PLMT NTRAC ST 1 LES: CPT | Performed by: INTERNAL MEDICINE

## 2021-09-13 PROCEDURE — C9600 PERC DRUG-EL COR STENT SING: HCPCS | Performed by: NURSE PRACTITIONER

## 2021-09-13 PROCEDURE — C1887 CATHETER, GUIDING: HCPCS | Performed by: NURSE PRACTITIONER

## 2021-09-13 PROCEDURE — 85347 COAGULATION TIME ACTIVATED: CPT

## 2021-09-13 PROCEDURE — 84484 ASSAY OF TROPONIN QUANT: CPT | Performed by: INTERNAL MEDICINE

## 2021-09-13 PROCEDURE — 93454 CORONARY ARTERY ANGIO S&I: CPT | Performed by: INTERNAL MEDICINE

## 2021-09-13 PROCEDURE — 93010 ELECTROCARDIOGRAM REPORT: CPT | Performed by: INTERNAL MEDICINE

## 2021-09-13 PROCEDURE — 027135Z DILATION OF CORONARY ARTERY, TWO ARTERIES WITH TWO DRUG-ELUTING INTRALUMINAL DEVICES, PERCUTANEOUS APPROACH: ICD-10-PCS | Performed by: INTERNAL MEDICINE

## 2021-09-13 PROCEDURE — 99223 1ST HOSP IP/OBS HIGH 75: CPT | Performed by: INTERNAL MEDICINE

## 2021-09-13 PROCEDURE — C1874 STENT, COATED/COV W/DEL SYS: HCPCS

## 2021-09-13 PROCEDURE — C1769 GUIDE WIRE: HCPCS | Performed by: NURSE PRACTITIONER

## 2021-09-13 PROCEDURE — 36415 COLL VENOUS BLD VENIPUNCTURE: CPT | Performed by: INTERNAL MEDICINE

## 2021-09-13 PROCEDURE — 99152 MOD SED SAME PHYS/QHP 5/>YRS: CPT | Performed by: NURSE PRACTITIONER

## 2021-09-13 PROCEDURE — 85610 PROTHROMBIN TIME: CPT | Performed by: NURSE PRACTITIONER

## 2021-09-13 PROCEDURE — 99152 MOD SED SAME PHYS/QHP 5/>YRS: CPT | Performed by: INTERNAL MEDICINE

## 2021-09-13 PROCEDURE — 99153 MOD SED SAME PHYS/QHP EA: CPT | Performed by: NURSE PRACTITIONER

## 2021-09-13 PROCEDURE — B2111ZZ FLUOROSCOPY OF MULTIPLE CORONARY ARTERIES USING LOW OSMOLAR CONTRAST: ICD-10-PCS | Performed by: INTERNAL MEDICINE

## 2021-09-13 PROCEDURE — 85730 THROMBOPLASTIN TIME PARTIAL: CPT | Performed by: INTERNAL MEDICINE

## 2021-09-13 PROCEDURE — 93005 ELECTROCARDIOGRAM TRACING: CPT

## 2021-09-13 PROCEDURE — 93454 CORONARY ARTERY ANGIO S&I: CPT | Performed by: NURSE PRACTITIONER

## 2021-09-13 PROCEDURE — 93306 TTE W/DOPPLER COMPLETE: CPT | Performed by: INTERNAL MEDICINE

## 2021-09-13 RX ORDER — MIDAZOLAM HYDROCHLORIDE 2 MG/2ML
INJECTION, SOLUTION INTRAMUSCULAR; INTRAVENOUS CODE/TRAUMA/SEDATION MEDICATION
Status: COMPLETED | OUTPATIENT
Start: 2021-09-13 | End: 2021-09-13

## 2021-09-13 RX ORDER — NITROGLYCERIN 20 MG/100ML
INJECTION INTRAVENOUS CODE/TRAUMA/SEDATION MEDICATION
Status: COMPLETED | OUTPATIENT
Start: 2021-09-13 | End: 2021-09-13

## 2021-09-13 RX ORDER — SODIUM CHLORIDE 9 MG/ML
100 INJECTION, SOLUTION INTRAVENOUS CONTINUOUS
Status: DISPENSED | OUTPATIENT
Start: 2021-09-13 | End: 2021-09-13

## 2021-09-13 RX ORDER — SODIUM CHLORIDE 9 MG/ML
100 INJECTION, SOLUTION INTRAVENOUS CONTINUOUS
Status: DISCONTINUED | OUTPATIENT
Start: 2021-09-13 | End: 2021-09-14 | Stop reason: HOSPADM

## 2021-09-13 RX ORDER — ASPIRIN 81 MG/1
324 TABLET, CHEWABLE ORAL ONCE
Status: COMPLETED | OUTPATIENT
Start: 2021-09-13 | End: 2021-09-13

## 2021-09-13 RX ORDER — LIDOCAINE HYDROCHLORIDE 10 MG/ML
INJECTION, SOLUTION EPIDURAL; INFILTRATION; INTRACAUDAL; PERINEURAL CODE/TRAUMA/SEDATION MEDICATION
Status: COMPLETED | OUTPATIENT
Start: 2021-09-13 | End: 2021-09-13

## 2021-09-13 RX ORDER — ATORVASTATIN CALCIUM 40 MG/1
40 TABLET, FILM COATED ORAL
Status: DISCONTINUED | OUTPATIENT
Start: 2021-09-13 | End: 2021-09-14 | Stop reason: HOSPADM

## 2021-09-13 RX ORDER — HEPARIN SODIUM 1000 [USP'U]/ML
INJECTION, SOLUTION INTRAVENOUS; SUBCUTANEOUS CODE/TRAUMA/SEDATION MEDICATION
Status: COMPLETED | OUTPATIENT
Start: 2021-09-13 | End: 2021-09-13

## 2021-09-13 RX ORDER — NITROGLYCERIN 0.4 MG/1
0.4 TABLET SUBLINGUAL
Qty: 30 TABLET | Refills: 0 | Status: CANCELLED | OUTPATIENT
Start: 2021-09-13

## 2021-09-13 RX ORDER — ATORVASTATIN CALCIUM 40 MG/1
40 TABLET, FILM COATED ORAL
Qty: 30 TABLET | Refills: 0 | Status: CANCELLED | OUTPATIENT
Start: 2021-09-13 | End: 2021-10-13

## 2021-09-13 RX ORDER — VERAPAMIL HYDROCHLORIDE 2.5 MG/ML
INJECTION, SOLUTION INTRAVENOUS CODE/TRAUMA/SEDATION MEDICATION
Status: COMPLETED | OUTPATIENT
Start: 2021-09-13 | End: 2021-09-13

## 2021-09-13 RX ORDER — FENTANYL CITRATE 50 UG/ML
INJECTION, SOLUTION INTRAMUSCULAR; INTRAVENOUS CODE/TRAUMA/SEDATION MEDICATION
Status: COMPLETED | OUTPATIENT
Start: 2021-09-13 | End: 2021-09-13

## 2021-09-13 RX ORDER — SODIUM CHLORIDE 9 MG/ML
INJECTION, SOLUTION INTRAVENOUS
Status: COMPLETED | OUTPATIENT
Start: 2021-09-13 | End: 2021-09-13

## 2021-09-13 RX ORDER — ASPIRIN 81 MG/1
81 TABLET, CHEWABLE ORAL DAILY
Qty: 30 TABLET | Refills: 0 | Status: CANCELLED | OUTPATIENT
Start: 2021-09-13 | End: 2021-10-13

## 2021-09-13 RX ADMIN — METOPROLOL TARTRATE 25 MG: 25 TABLET, FILM COATED ORAL at 21:34

## 2021-09-13 RX ADMIN — TICAGRELOR 90 MG: 90 TABLET ORAL at 21:34

## 2021-09-13 RX ADMIN — MIDAZOLAM HYDROCHLORIDE 2 MG: 1 INJECTION, SOLUTION INTRAMUSCULAR; INTRAVENOUS at 15:52

## 2021-09-13 RX ADMIN — HEPARIN SODIUM 6000 UNITS: 1000 INJECTION INTRAVENOUS; SUBCUTANEOUS at 16:02

## 2021-09-13 RX ADMIN — IOHEXOL 100 ML: 350 INJECTION, SOLUTION INTRAVENOUS at 16:13

## 2021-09-13 RX ADMIN — NITROGLYCERIN 200 MCG: 20 INJECTION INTRAVENOUS at 15:59

## 2021-09-13 RX ADMIN — ASPIRIN 324 MG: 81 TABLET, CHEWABLE ORAL at 08:42

## 2021-09-13 RX ADMIN — ATORVASTATIN CALCIUM 40 MG: 40 TABLET, FILM COATED ORAL at 17:01

## 2021-09-13 RX ADMIN — METOPROLOL TARTRATE 25 MG: 25 TABLET, FILM COATED ORAL at 09:30

## 2021-09-13 RX ADMIN — HEPARIN SODIUM 4000 UNITS: 1000 INJECTION INTRAVENOUS; SUBCUTANEOUS at 15:59

## 2021-09-13 RX ADMIN — VERAPAMIL HYDROCHLORIDE 2.5 MG: 2.5 INJECTION, SOLUTION INTRAVENOUS at 15:59

## 2021-09-13 RX ADMIN — TICAGRELOR 90 MG: 90 TABLET ORAL at 11:38

## 2021-09-13 RX ADMIN — SODIUM CHLORIDE 100 ML/HR: 0.9 INJECTION, SOLUTION INTRAVENOUS at 16:49

## 2021-09-13 RX ADMIN — FENTANYL CITRATE 50 MCG: 50 INJECTION, SOLUTION INTRAMUSCULAR; INTRAVENOUS at 15:52

## 2021-09-13 RX ADMIN — HEPARIN SODIUM 4000 UNITS: 1000 INJECTION INTRAVENOUS; SUBCUTANEOUS at 01:37

## 2021-09-13 RX ADMIN — HEPARIN SODIUM 2000 UNITS: 1000 INJECTION INTRAVENOUS; SUBCUTANEOUS at 09:31

## 2021-09-13 RX ADMIN — SODIUM CHLORIDE 100 ML/HR: 9 INJECTION, SOLUTION INTRAVENOUS at 15:49

## 2021-09-13 RX ADMIN — LIDOCAINE HYDROCHLORIDE 1 ML: 10 INJECTION, SOLUTION EPIDURAL; INFILTRATION; INTRACAUDAL; PERINEURAL at 15:58

## 2021-09-13 NOTE — ASSESSMENT & PLAN NOTE
· History of endometrial cancer FIGO stage I A status post hysterectomy  · Stable continue outpatient surveillance

## 2021-09-13 NOTE — ASSESSMENT & PLAN NOTE
Presents with chest pain that started the previous night was progressively getting worse  At peak pain was 10/10 with radiation down left arm  EKG similar to prior  Troponin elevated concerning for ACS  ASA, Brilinta given in the ED  Heart score of 8 points  SERGEY score 5   troponins peaked at 16  telemetry- has been benign while on the floors  Overnight, had some tachycardia with nonspecific st changes  Echo showed a grade 1 diastolic dysfunction with an EF of 60%    Plan  · Heparin drip per protocol    · Cardiac cath today

## 2021-09-13 NOTE — DISCHARGE SUMMARY
[facility]  Discharge- Carmen Bing 1955, 78 y o  female MRN: [id number]  Unit/Bed#: S -01 Encounter: [id number]  Primary Care Provider: Lyndsey Lara MD   Date and time admitted to hospital: 9/12/2021  4:39 PM    * NSTEMI (non-ST elevated myocardial infarction) [facility])  Assessment & Plan  Presents with chest pain that started the previous night was progressively getting worse  At peak pain was 10/10 with radiation down left arm  EKG similar to prior  Troponin elevated concerning for ACS  ASA, Brilinta given in the ED  Heart score of 8 points  SERGEY score 5   troponins peaked at 16  telemetry- has been benign while on the floors  Overnight, had some tachycardia with nonspecific st changes  Echo showed a grade 1 diastolic dysfunction with an EF of 60%  Cardiac cath 9/13: Mid LAD: There was a 90 % stenosis  This lesion is a likely culprit for the patient's recent myocardial infarction  Mid circumflex: There was a 50 % stenosis  Distal circumflex: There was a 90 % stenosis  Proximal RCA: There was a 50 % stenosis  Mid RCA: There was a 30 % stenosis  LAD and distal circ were stented    Plan  DAPT x1 year, ASA indefinitely    Abnormal TSH  Assessment & Plan  TSH on admission was 6 675  Free T4 has been sent, patient was instructed to follow up the result with her PCP outpatient    Anemia  Assessment & Plan    Recent Labs     09/12/21  1634 09/14/21  0448   HGB 12 0 10 0*     Likely hemodilutional due to patient receiving fluids    History of endometrial cancer  Assessment & Plan  · History of endometrial cancer FIGO stage I A status post hysterectomy  · Stable continue outpatient surveillance      Medical Problems     Resolved Problems  Date Reviewed: 9/14/2021        Resolved    Elevated troponin 9/14/2021     Resolved by  Carola Rogers DO    Hypertensive urgency 9/14/2021     Resolved by  Carola Rogers DO              Discharging Resident: Brooke Monge Mavis Curry DO  Discharging Attending: Albaro Bonilla*  PCP: Desmond Arevalo MD  Admission Date:   Admission Orders (From admission, onward)     Ordered        09/12/21 1742  Inpatient Admission  Once                   Discharge Date: 09/14/21    Consultations During Hospital Stay:  · Cardiology    Procedures Performed:   · Or echo  · Cardiac catheterization    Significant Findings / Test Results:   · 90% stenosis of the mid LAD and distal circumflex, both now s/p THALIA  · Mid circumflex: 50 % stenosis, Proximal RCA: 50 % stenosis, Mid RCA: 30 % stenosis  Incidental Findings:   · none    Test Results Pending at Discharge (will require follow up):  · none     Outpatient Tests Requested:  · none    Complications:  None    Reason for Admission:  Chest pain    Hospital Course:   Veda Correa is a 78 y o  female patient who originally presented to the hospital on 9/12/2021 due to worsening chest pain that started the night prior to admission  She thought it was heartburn so she did not seek any medical attention and took Tums with no avail  The pain was described as substernal radiating to the left arm  She was admitted due to chest pain with an elevated troponin with a consult to Cardiology  She was also hypertensive when she came to the emergency room and was started on metoprolol  She was managed with the ACS protocol with a heparin drip, was also given Brilinta in the ED  She received a left heart catheterization on 09/13/2021  This showed 90% stenosis of the mid LAD and distal circumflex, both were stented during the catheterization  She is deemed medically stable for discharge on the following medications:  Metoprolol, Brilinta, aspirin, Lipitor, nitroglycerin for chest pain as needed  Please see above list of diagnoses and related plan for additional information  Condition at Discharge: good    Discharge Day Visit / Exam:   Subjective:  Patient is resting comfortably in bedside sofa  She denies any CP, diaphoresis, nausea, vomiting, SOB  Vitals: Blood Pressure: 137/64 (09/14/21 0630)  Pulse: 63 (09/14/21 0630)  Temperature: 98 4 °F (36 9 °C) (09/14/21 0239)  Temp Source: Oral (09/14/21 0239)  Respirations: 16 (09/14/21 0630)  Height: 5' 3" (160 cm) (09/12/21 1626)  Weight - Scale: 78 5 kg (173 lb) (09/12/21 1832)  SpO2: 95 % (09/14/21 0630)  Exam:   Physical Exam  Vitals and nursing note reviewed  Constitutional:       General: She is not in acute distress  Appearance: She is well-developed  HENT:      Head: Normocephalic and atraumatic  Mouth/Throat:      Mouth: Mucous membranes are moist    Eyes:      Extraocular Movements: Extraocular movements intact  Conjunctiva/sclera: Conjunctivae normal       Pupils: Pupils are equal, round, and reactive to light  Cardiovascular:      Rate and Rhythm: Normal rate and regular rhythm  Pulses: Normal pulses  Heart sounds: Normal heart sounds  No murmur heard  Pulmonary:      Effort: Pulmonary effort is normal  No respiratory distress  Breath sounds: Normal breath sounds  Abdominal:      General: Abdomen is flat  Bowel sounds are normal  There is no distension  Palpations: Abdomen is soft  Tenderness: There is no abdominal tenderness  Musculoskeletal:         General: No swelling or tenderness  Cervical back: Neck supple  Skin:     General: Skin is warm and dry  Neurological:      Mental Status: She is alert and oriented to person, place, and time  Psychiatric:         Mood and Affect: Mood normal           Discussion with Family: Patient declined call to   Discharge instructions/Information to patient and family:   See after visit summary for information provided to patient and family  Provisions for Follow-Up Care:  See after visit summary for information related to follow-up care and any pertinent home health orders         Disposition:   Home    Planned Readmission: none    Discharge Medications:  See after visit summary for reconciled discharge medications provided to patient and/or family        **Please Note: This note may have been constructed using a voice recognition system**

## 2021-09-13 NOTE — ASSESSMENT & PLAN NOTE
Presents with chest pain that started the previous night was progressively getting worse  At peak pain was 10/10 with radiation down left arm  EKG similar to prior  Troponin elevated concerning for ACS  ASA, Brilinta given in the ED  Heart score of 8 points  SERGEY score 5  Plan  · Trend troponins  · Monitor on telemetry  · Heparin drip per protocol  · Consult inpatient cardiology

## 2021-09-13 NOTE — PROGRESS NOTES
[facility]  Progress Note - Krystal Zafar 1955, 78 y o  female MRN: [id number]  Unit/Bed#: S -01 Encounter: [id number]  Primary Care Provider: Adarsh Travis MD   Date and time admitted to hospital: 9/12/2021  4:39 PM    Hypertensive urgency  Assessment & Plan  Plan  · Labetalol 5 mg prn Q 6 hours for systolic blood pressure higher than 180  · Monitor V/S per protocol  · Has been normotensive since yesterday 2000    Blood Pressure: 111/55    Elevated troponin  Assessment & Plan  Lab Results   Component Value Date    TROPONINI 11 01 (H) 09/13/2021    TROPONINI 13 78 (H) 09/13/2021    TROPONINI 16 01 (H) 09/13/2021     · Stop trending trop as it is now down trending    History of endometrial cancer  Assessment & Plan  · History of endometrial cancer FIGO stage I A status post hysterectomy  · Stable continue outpatient surveillance  * Chest pain, atypical  Assessment & Plan  Presents with chest pain that started the previous night was progressively getting worse  At peak pain was 10/10 with radiation down left arm  EKG similar to prior  Troponin elevated concerning for ACS  ASA, Brilinta given in the ED  Heart score of 8 points  SERGEY score 5   troponins peaked at 16  telemetry- has been benign while on the floors  Overnight, had some tachycardia with nonspecific st changes  Plan  · Heparin drip per protocol  · Cardiac cath today      VTE Pharmacologic Prophylaxis: VTE Score: 6 High Risk (Score >/= 5) - Pharmacological DVT Prophylaxis Ordered: heparin drip  Sequential Compression Devices Ordered  Patient Centered Rounds: I performed bedside rounds with nursing staff today  Discussions with Specialists or Other Care Team Provider: Cardiology note reviewed    Education and Discussions with Family / Patient: Patient declined call to   Time Spent for Care: 30 minutes   More than 50% of total time spent on counseling and coordination of care as described above  Current Length of Stay: 1 day(s)  Current Patient Status: Inpatient   Certification Statement: The patient will continue to require additional inpatient hospital stay due to Cardiac catheterization  Discharge Plan: Anticipate discharge later today or tomorrow to home  Code Status: Level 1 - Full Code    Subjective:   Patient resting comfortably today, no acute overnight events  She states she has not had any pain today or overnight  She denies any shortness of breath diaphoresis, nausea, vomiting  No dysuria  Has not had bowel movement  No swelling or weakness or numbness/tingling in the extremities  Objective:     Vitals:   Temp (24hrs), Av 9 °F (36 6 °C), Min:97 9 °F (36 6 °C), Max:97 9 °F (36 6 °C)    Temp:  [97 9 °F (36 6 °C)] 97 9 °F (36 6 °C)  HR:  [56-90] 58  Resp:  [16-20] 18  BP: ()/() 111/55  SpO2:  [96 %-98 %] 96 %  Body mass index is 30 65 kg/m²  Input and Output Summary (last 24 hours): Intake/Output Summary (Last 24 hours) at 2021 1531  Last data filed at 2021 0915  Gross per 24 hour   Intake 0 ml   Output --   Net 0 ml       Physical Exam:   Physical Exam  Vitals and nursing note reviewed  Constitutional:       General: She is not in acute distress  Appearance: She is well-developed  HENT:      Head: Normocephalic and atraumatic  Mouth/Throat:      Mouth: Mucous membranes are moist    Eyes:      Extraocular Movements: Extraocular movements intact  Conjunctiva/sclera: Conjunctivae normal       Pupils: Pupils are equal, round, and reactive to light  Cardiovascular:      Rate and Rhythm: Regular rhythm  Bradycardia present  Pulses: Normal pulses  Heart sounds: Normal heart sounds  No murmur heard  Pulmonary:      Effort: Pulmonary effort is normal  No respiratory distress  Breath sounds: Normal breath sounds  Abdominal:      General: Abdomen is flat  Bowel sounds are normal  There is no distension  Palpations: Abdomen is soft  Tenderness: There is no abdominal tenderness  Musculoskeletal:         General: No swelling or tenderness  Cervical back: Neck supple  Skin:     General: Skin is warm and dry  Neurological:      Mental Status: She is alert and oriented to person, place, and time     Psychiatric:         Mood and Affect: Mood normal           Additional Data:     Labs:  Results from last 7 days   Lab Units 09/12/21  1634   WBC Thousand/uL 6 91   HEMOGLOBIN g/dL 12 0   HEMATOCRIT % 38 9   PLATELETS Thousands/uL 287   NEUTROS PCT % 59   LYMPHS PCT % 29   MONOS PCT % 9   EOS PCT % 1     Results from last 7 days   Lab Units 09/12/21  1634   SODIUM mmol/L 142   POTASSIUM mmol/L 4 2   CHLORIDE mmol/L 106   CO2 mmol/L 26   BUN mg/dL 10   CREATININE mg/dL 0 68   ANION GAP mmol/L 10   CALCIUM mg/dL 9 7   ALBUMIN g/dL 4 0   TOTAL BILIRUBIN mg/dL 0 25   ALK PHOS U/L 63   ALT U/L 38   AST U/L 47*   GLUCOSE RANDOM mg/dL 86     Results from last 7 days   Lab Units 09/12/21  1634   INR  0 95                   Lines/Drains:  Invasive Devices     Peripheral Intravenous Line            Peripheral IV 09/12/21 Left Antecubital <1 day    Peripheral IV 09/12/21 Right Forearm <1 day                  Telemetry:  Telemetry Orders (From admission, onward)             48 Hour Telemetry Monitoring  Continuous x 48 hours     Question:  Reason for 48 Hour Telemetry  Answer:  Acute MI, chest pain - R/O MI, or unstable angina                 Telemetry Reviewed: Sinus Bradycardia  Indication for Continued Telemetry Use: Acute MI/Unstable Angina/Rule out ACS           Imaging: Reviewed radiology reports from this admission including: chest xray    Recent Cultures (last 7 days):         Last 24 Hours Medication List:   Current Facility-Administered Medications   Medication Dose Route Frequency Provider Last Rate    atorvastatin  40 mg Oral Daily With Dinner DANIELLE Wilson      heparin (porcine)  3-20 Units/kg/hr (Order-Specific) Intravenous Titrated Rose Haque MD 18 Units/kg/hr (09/13/21 0933)    heparin (porcine)  2,000 Units Intravenous Q1H PRN Rose Haque MD      heparin (porcine)  4,000 Units Intravenous Q1H PRN Rose Haque MD      Labetalol HCl  5 mg Intravenous Q6H PRN Arden Holt DO      metoprolol tartrate  25 mg Oral Q12H [facility] Rose Haque MD      nitroglycerin  0 4 mg Sublingual Q5 Min PRN Rose Haque MD      sodium chloride  100 mL/hr Intravenous Continuous DANIELLE Mackey      ticagrelor  90 mg Oral Q12H [address] [place] [address] DANIELLE Gutierrez          Today, Patient Was Seen By: Radha Le DO    **Please Note: This note may have been constructed using a voice recognition system  **

## 2021-09-13 NOTE — DISCHARGE INSTR - AVS FIRST PAGE
Dear Bernard Son,     It was our pleasure to care for you here at [facility]  It is our hope that we were always able to exceed the expected standards for your care during your stay  You were hospitalized due to NSTEMI (heart attack)  You were cared for on the third floor by Tami Rao DO under the service of Ana Bowman MD with the [facility] who covers for your primary care physician (PCP), Nai Prado MD, while you were hospitalized  If you have any questions or concerns related to this hospitalization, you may contact us at [phone]  For follow up as well as any medication refills, we recommend that you follow up with your primary care physician  A registered nurse will reach out to you by phone within a few days after your discharge to answer any additional questions that you may have after going home  However, at this time we provide for you here, the most important instructions / recommendations at discharge:     · Notable Medication Adjustments -   · Aspirin 81 mg once daily  · Brilinta 90 mg twice daily  · Metoprolol tartrate 25 mg twice daily  · Atorvastatin 40 mg every day with dinner  · Nitroglycerin as needed for chest pain  · Testing Required after Discharge -   · none  · Important follow up information -   · Please make and appointment with your PCP as well as the cardiologist   · Other Instructions -   · If you experience worsening chest pain, arm pain, trouble breathing, sweatiness, nausea or vomiting please return to the ED  · Please review this entire after visit summary as additional general instructions including medication list, appointments, activity, diet, any pertinent wound care, and other additional recommendations from your care team that may be provided for you        Sincerely,     Tami Rao DO

## 2021-09-13 NOTE — DISCHARGE INSTRUCTIONS
Chest Pain, Ambulatory Care   GENERAL INFORMATION:   Chest pain  can be caused by a range of conditions, from not serious to life-threatening  It may be caused by a heart attack or a blood clot in your lungs  Sometimes chest pain or pressure is caused by poor blood flow to your heart (angina)  Infection, inflammation, or a fracture in the bones or cartilage in your chest can cause pain or discomfort  Chest pain can also be a symptom of a digestive problem, such as acid reflux or a stomach ulcer  Common symptoms include the following:   · Fever or sweating     · Nausea or vomiting     · Shortness of breath     · Discomfort or pressure that spreads from your chest to your back, jaw, or arm     · A racing or slow heartbeat     · Feeling weak, tired, or faint  Seek immediate care for the following symptoms:   · Any of the following signs of a heart attack:      ¨ Squeezing, pressure, or pain in your chest that lasts longer than 5 minutes or returns    ¨ Discomfort or pain in your back, neck, jaw, stomach, or arm     ¨ Trouble breathing     ¨ Nausea or vomiting    ¨ Lightheadedness or a sudden cold sweat, especially with trouble breathing         · Chest discomfort that gets worse, even with medicine    · Coughing or vomiting blood    · Black or bloody bowel movements     · Vomiting that does not stop, or pain when you swallow  Treatment for chest pain  may include medicine to treat your symptoms while he determines the cause of your chest pain  You may also need any of the following:  · Antiplatelets , such as aspirin, help prevent blood clots  Take your antiplatelet medicine exactly as directed  These medicines make it more likely for you to bleed or bruise  If you are told to take aspirin, do not take acetaminophen or ibuprofen instead  · Prescription pain medicine  may be given  Ask how to take this medicine safely  Do not smoke: If you smoke, it is never too late to quit   Smoking increases your risk for a heart attack and other heart and lung conditions  Ask your healthcare provider for information about how to stop smoking if you need help  Follow up with your healthcare provider as directed: You may need more tests  You may be referred to a specialist, such as a cardiologist or gastroenterologist  Write down your questions so you remember to ask them during your visits  CARE AGREEMENT:   You have the right to help plan your care  Learn about your health condition and how it may be treated  Discuss treatment options with your caregivers to decide what care you want to receive  You always have the right to refuse treatment  The above information is an  only  It is not intended as medical advice for individual conditions or treatments  Talk to your doctor, nurse or pharmacist before following any medical regimen to see if it is safe and effective for you  © 2014 [address] is for End User's use only and may not be sold, redistributed or otherwise used for commercial purposes  All illustrations and images included in CareNotes® are the copyrighted property of [employer] M [employer]  or Ghulam Scanlon  Healthy Lifestyle After a Heart Attack   AMBULATORY CARE:   A healthy lifestyle  after a heart attack can help you recover and also prevent another heart attack  A healthy lifestyle includes managing health conditions that increase your risk for another heart attack  It also includes eating healthy foods and exercising to give you more energy to do the things you enjoy in your life  Go to cardiac rehabilitation as directed: This is a program run by specialists who will help you safely strengthen your heart and prevent more heart disease  This plan includes exercise, relaxation, stress management, and heart-healthy nutrition  Healthcare providers will also check to make sure any medicines you are taking are working   The plan may also include instructions for when you can drive, return to work, and do other normal daily activities  Do not smoke:  Nicotine and other chemicals in cigarettes and cigars can cause lung and heart damage  Ask your healthcare provider for information if you currently smoke and need help to quit  E-cigarettes or smokeless tobacco still contain nicotine  Talk to your healthcare provider before you use these products  Manage other medical conditions:  Diabetes and high cholesterol increases your risk for another heart attack and stroke  Talk to your healthcare provider about your management plan  He or she will make a plan that helps you manage your conditions  Follow a heart-healthy diet:  A heart-healthy diet is an eating plan low in total fat, unhealthy fats, and sodium (salt)  A heart-healthy diet helps decrease your risk for heart disease and stroke  Limit the amount of fat you eat to 25% to 35% of your total daily calories  Your healthcare provider may recommend the DASH (Dietary Approaches to Stop Hypertension) Eating Plan to help lower high blood pressure and LDL (bad) cholesterol  The plan is low in sodium, sugar, unhealthy fats, and total fat  It is high in potassium, calcium, magnesium, and fiber  Ask for more information about this plan  Limit sodium (salt) as directed: Too much sodium can affect your fluid balance  Check labels to find low-sodium or no-salt-added foods  Some low-sodium foods use potassium salts for flavor  Too much potassium can also cause health problems  Your healthcare provider will tell you how much sodium and potassium are safe for you to have in a day  He or she may recommend that you limit sodium to 2,000 to 2,300 mg a day  Exercise as directed:  Ask your healthcare provider about the best exercise plan for you  Exercise makes your heart stronger, lowers blood pressure, and helps prevent a heart attack  The goal is 30 to 60 minutes a day, 5 to 7 days a week   You may have to work up to this goal  Healthcare providers can help you reach this goal, starting in cardiac rehab sessions  Maintain a healthy weight:  Extra weight puts stress on your heart and makes it work harder  Ask your healthcare provider how much you should weigh  He or she can help you create a safe weight loss plan if you are overweight  Manage stress:  Stress may increase your risk for another heart attack  Learn ways to control stress, such as relaxation, deep breathing, and music  Talk to someone about things that upset you  Check your blood pressure at home:  A high blood pressure can increase your risk for another heart attack  Sit and rest for 5 minutes before you take your blood pressure  Extend your arm and support it on a flat surface  Your arm should be at the same level as your heart  Follow the directions that came with your monitor  If possible, take at least 2 readings each time  Take your blood pressure at least 2 times each day at the same times, such as mornings and evenings  Keep a record of your readings and bring it to your follow-up visits  Ask your healthcare provider what your blood pressure should be  Get a flu vaccine every year as soon as it is available: The vaccine will help prevent the flu  A heart attack will make it harder for you to fight off the flu virus on your own  The flu may also be worse for you than for a person who has not had a heart attack  Ask about other vaccinations you may need  Do not take any medicines without talking to your healthcare provider first:  Some medicines, such as NSAIDs and hormones, may cause problems if taken with your heart medicines  Always ask your healthcare provider before you take any other medicines  Follow up with your doctor or cardiologist as directed:  Write down your questions so you remember to ask them during your visits    © Copyright [employer] 2021 Information is for End User's use only and may not be sold, redistributed or otherwise used for commercial purposes  All illustrations and images included in CareNotes® are the copyrighted property of [employer] M [employer] Inc  or Maria Del Rosario Santizo   The above information is an  only  It is not intended as medical advice for individual conditions or treatments  Talk to your doctor, nurse or pharmacist before following any medical regimen to see if it is safe and effective for you  Heart Attack   WHAT YOU NEED TO KNOW:   A heart attack happens when the blood vessels that supply blood to your heart are blocked  This can damage your heart or lead to an abnormal heart rhythm or heart failure  A heart attack is also called a myocardial infarction  DISCHARGE INSTRUCTIONS:   Call your local emergency number (911 in the [address] [place] [address],[address]) for any of the following:   · You have any of the following signs of a heart attack:      ? Squeezing, pressure, or pain in your chest    ? You may  also have any of the following:     § Discomfort or pain in your back, neck, jaw, stomach, or arm    § Shortness of breath    § Nausea or vomiting    § Lightheadedness or a sudden cold sweat      Seek care immediately if:   · You are tired and cannot think clearly  · Your heart is beating faster than usual     · You are bleeding from your gums or nose  · You see blood in your urine or bowel movements  · You urinate less than usual or not at all  · You have new or increased swelling in your feet or ankles  Call your doctor or cardiologist if:   · You have trouble taking your heart medicine  · You have questions or concerns about your condition or care  Medicines: You may  need any of the following:  · Heart medicines  help decrease blood pressure, control your heart rate, and help your heart function better  · Nitroglycerin  opens the arteries to your heart, increases oxygen levels, and can decrease chest pain  You may get your nitroglycerin as a pill, a patch, or a paste   Ask your healthcare provider or cardiologist how to safely take this medicine  · Aspirin  helps prevent clots from forming and causing blood flow problems  If healthcare providers want you to take aspirin daily, do not take acetaminophen or ibuprofen instead  Do not take more or less aspirin than healthcare providers say to take  If you are on another blood thinner medicine, ask your healthcare provider or cardiologist before you take aspirin for any reason  · Antiplatelets , such as aspirin, help prevent blood clots  Take your antiplatelet medicine exactly as directed  These medicines make it more likely for you to bleed or bruise  If you are told to take aspirin, do not take acetaminophen or ibuprofen instead  · Blood thinners  help prevent blood clots  Clots can cause strokes, heart attacks, and death  The following are general safety guidelines to follow while you are taking a blood thinner:    ? Watch for bleeding and bruising while you take blood thinners  Watch for bleeding from your gums or nose  Watch for blood in your urine and bowel movements  Use a soft washcloth on your skin, and a soft toothbrush to brush your teeth  This can keep your skin and gums from bleeding  If you shave, use an electric shaver  Do not play contact sports  ? Tell your dentist and other healthcare providers that you take a blood thinner  Wear a bracelet or necklace that says you take this medicine  ? Do not start or stop any other medicines unless your healthcare provider tells you to  Many medicines cannot be used with blood thinners  ? Take your blood thinner exactly as prescribed by your healthcare provider  Do not skip does or take less than prescribed  Tell your provider right away if you forget to take your blood thinner, or if you take too much  ? Warfarin  is a blood thinner that you may need to take  The following are things you should be aware of if you take warfarin:     § Foods and medicines can affect the amount of warfarin in your blood  Do not make major changes to your diet while you take warfarin  Warfarin works best when you eat about the same amount of vitamin K every day  Vitamin K is found in green leafy vegetables and certain other foods  Ask for more information about what to eat when you are taking warfarin  § You will need to see your healthcare provider for follow-up visits when you are on warfarin  You will need regular blood tests  These tests are used to decide how much medicine you need  · Cholesterol medicine  decreases cholesterol and the amount of plaque in your blood  · Do not take certain medicines without asking your healthcare provider first   These include NSAIDs, herbal or vitamin supplements, or hormones (estrogen or progestin)  · Take your medicine as directed  Contact your healthcare provider if you think your medicine is not helping or if you have side effects  Tell him or her if you are allergic to any medicine  Keep a list of the medicines, vitamins, and herbs you take  Include the amounts, and when and why you take them  Bring the list or the pill bottles to follow-up visits  Carry your medicine list with you in case of an emergency  Cardiac rehabilitation (rehab)  is a program run by specialists who will help you safely strengthen your heart and prevent more heart disease  The plan includes exercise, relaxation, stress management, and heart-healthy nutrition  Healthcare providers will also check to make sure any medicines you take are working  The plan may also include instructions for when you can drive, return to work, and do other normal daily activities  Manage other health conditions:  Diabetes and high cholesterol increases your risk for another heart attack and stroke  Talk to your healthcare provider about your management plan  He or she will make a plan that helps you manage your conditions     Check your blood pressure at home:  High blood pressure can increase your risk for another heart attack  Sit and rest for 5 minutes before you take your blood pressure  Extend your arm and support it on a flat surface  Your arm should be at the same level as your heart  Follow the directions that came with your monitor  If possible, take at least 2 readings each time  Take your blood pressure at least 2 times each day at the same times, such as mornings and evenings  Keep a record of your readings and bring it to your follow-up visits  Ask your healthcare provider what your blood pressure should be  Get a flu vaccine every year as soon as it is available: The vaccine will help prevent the flu  A heart attack will make it harder for you to fight off the flu virus on your own  The flu may also be worse for you than for a person who has not had a heart attack  Ask about other vaccinations you may need  Lifestyle changes you may need to make after a heart attack:   · Follow a heart-healthy diet  A heart-healthy diet is an eating plan low in total fat, unhealthy fats, and sodium (salt)  A heart-healthy diet helps decrease your risk for heart disease and stroke  Limit the amount of fat you eat to 25% to 35% of your total daily calories  Your healthcare provider may recommend the DASH (Dietary Approaches to Stop Hypertension) Eating Plan to help lower high blood pressure and LDL (bad) cholesterol  The plan is low in sodium, sugar, unhealthy fats, and total fat  It is high in potassium, calcium, magnesium, and fiber  Ask for more information about this plan  · Limit sodium (salt) as directed  Too much sodium can affect your fluid balance  Check labels to find low-sodium or no-salt-added foods  Some low-sodium foods use potassium salts for flavor  Too much potassium can also cause health problems  Your healthcare provider will tell you how much sodium and potassium are safe for you to have in a day  He or she may recommend that you limit sodium to 2,300 mg a day  · Do not smoke    Nicotine and other chemicals in cigarettes and cigars can cause lung and heart damage  Ask your healthcare provider for information if you currently smoke and need help to quit  E-cigarettes or smokeless tobacco still contain nicotine  Talk to your healthcare provider before you use these products  · Exercise as directed  Ask your healthcare provider about the best exercise plan for you  Exercise makes your heart stronger, lowers blood pressure, and helps prevent a heart attack  The goal is 30 to 60 minutes a day, 5 to 7 days a week  You may have to work up to this goal  Healthcare providers can help you reach this goal, starting in cardiac rehab sessions  · Maintain a healthy weight  Ask your healthcare provider how much you should weigh  He or she can help you create a safe weight loss plan if you are overweight  · Manage stress  Stress may increase your risk for another heart attack  Learn ways to control stress, such as relaxation, deep breathing, and music  Talk to someone about things that upset you  Follow up with your healthcare provider or cardiologist within 14 days or as directed:  Write down your questions so you remember to ask them during your visits  © Copyright [employer] 2021 Information is for End User's use only and may not be sold, redistributed or otherwise used for commercial purposes  All illustrations and images included in CareNotes® are the copyrighted property of [employer]  or [facility]   The above information is an  only  It is not intended as medical advice for individual conditions or treatments  Talk to your doctor, nurse or pharmacist before following any medical regimen to see if it is safe and effective for you

## 2021-09-13 NOTE — ASSESSMENT & PLAN NOTE
Plan  · Labetalol 5 mg prn Q 6 hours for systolic blood pressure higher than 180  · Monitor V/S per protocol

## 2021-09-13 NOTE — ED NOTES
Provided patient with bedside commode  Tolerated well without complaint to increased chest pain        Janie Low RN  09/12/21 7080

## 2021-09-13 NOTE — ASSESSMENT & PLAN NOTE
Presents with chest pain that started the previous night was progressively getting worse  At peak pain was 10/10 with radiation down left arm  EKG similar to prior  Troponin elevated concerning for ACS  ASA, Brilinta given in the ED  Heart score of 8 points  SERGEY score 5   troponins peaked at 16  telemetry- has been benign while on the floors  Overnight, had some tachycardia with nonspecific st changes  Plan  · Heparin drip per protocol    · Cardiac cath today

## 2021-09-13 NOTE — ED ATTENDING ATTESTATION
9/12/2021  INilda MD, saw and evaluated the patient  I have discussed the patient with the resident/non-physician practitioner and agree with the resident's/non-physician practitioner's findings, Plan of Care, and MDM as documented in the resident's/non-physician practitioner's note, except where noted  All available labs and Radiology studies were reviewed  I was present for key portions of any procedure(s) performed by the resident/non-physician practitioner and I was immediately available to provide assistance  At this point I agree with the current assessment done in the Emergency Department    I have conducted an independent evaluation of this patient a history and physical is as follows:see h and p above- resident ordered d dimer/ bnp-- --     ED Course  ED Course as of Sep 13 0037   Sun Sep 12, 2021   1723 Cxr pa/lat-  compared to previous - no sign changes- no change in mediastinum/cardiac silhouette- no free/sq air- no infiltrate/ ptx/ pulm edema/ plerual effusions       1926 Er md note- pt - re-evaluated by er md- currently pain free with no complaints--  will continue to monitor while in er       1926 Er md medical decision making note-  based on h and p- testing- er md clinical suspicion of tad/vte are low             Critical Care Time  Procedures

## 2021-09-13 NOTE — ASSESSMENT & PLAN NOTE
Plan  · Labetalol 5 mg prn Q 6 hours for systolic blood pressure higher than 180  · Monitor V/S per protocol     · Has been normotensive since yesterday 2000    Blood Pressure: 111/55

## 2021-09-13 NOTE — PLAN OF CARE
Problem: CARDIOVASCULAR - ADULT  Goal: Maintains optimal cardiac output and hemodynamic stability  Description: INTERVENTIONS:  - Monitor I/O, vital signs and rhythm  - Monitor for S/S and trends of decreased cardiac output  - Administer and titrate ordered vasoactive medications to optimize hemodynamic stability  - Assess quality of pulses, skin color and temperature  - Assess for signs of decreased coronary artery perfusion  - Instruct patient to report change in severity of symptoms  Outcome: Progressing  Goal: Absence of cardiac dysrhythmias or at baseline rhythm  Description: INTERVENTIONS:  - Continuous cardiac monitoring, vital signs, obtain 12 lead EKG if ordered  - Administer antiarrhythmic and heart rate control medications as ordered  - Monitor electrolytes and administer replacement therapy as ordered  Outcome: Progressing     Problem: HEMATOLOGIC - ADULT  Goal: Maintains hematologic stability  Description: INTERVENTIONS  - Assess for signs and symptoms of bleeding or hemorrhage  - Monitor labs  - Administer supportive blood products/factors as ordered and appropriate  Outcome: Progressing     Problem: PAIN - ADULT  Goal: Verbalizes/displays adequate comfort level or baseline comfort level  Description: Interventions:  - Encourage patient to monitor pain and request assistance  - Assess pain using appropriate pain scale  - Administer analgesics based on type and severity of pain and evaluate response  - Implement non-pharmacological measures as appropriate and evaluate response  - Consider cultural and social influences on pain and pain management  - Notify physician/advanced practitioner if interventions unsuccessful or patient reports new pain  Outcome: Progressing

## 2021-09-13 NOTE — CONSULTS
Cardiology   Veda Correa 78 y o  female MRN: [id number]  Unit/Bed#: ED 07 Encounter: [id number]      Reason for Consult / Principal Problem: Elevated troponin    Physician Requesting Consult:  Jennifer Avila MD    Cardiologist: None    Assessment  1  CP/elevated troponin  - concern for unstable angina/NSTEMI  -Currently w/o c/o CP  -12 lead ECG 9/12; NSR, incomplete RBBB, LVH w/associated ST-T wave / repolarization abnormalities (similar to ECG from 2016)  -Trop elevation - 2 04 -- 7 25 -- 13 3 -- 16 -- 13 8  -Received full dose asa /Brilinta load (180 mg) - on 9/12  -Currently on IV heparin GTT ACS low     2  Accelerated HTN (POA)  -Improved  -/100 on admission, last recorded at 111/55  -Previously on no anti hypertensive medications  -Currently on metoprolol tartrate 25 mg 12h    3  Dyslipidemia  -Lipid profile 7/27/2021; chol 206, trig 207, HDL 33,   -Previously on no statin     4  Obesity; BMP 31    5  Hx of endometrial CA s/p hysterecetomy    Plan  -Proceed w/C for definitive ischemic evaluation  -Obtain TTE - r/o RWMA, structural / valvular disease  -Continue asa/Brilinta and BB  -Initiate high intensity statin  -Continue IV heparin GTT ACS low  -Obtain hgba1c level  -Monitor renal function and electrolytes closely  -Monitor on telemery    HPI: Veda Correa 78y o  year old female w/ a medical history of dyslipidemia, obesity, and endometrial CA s/p hysterectomy  She is a lifelong nonsmoker, denies excessive alcohol or recreational drug use  No significant family history for heart disease  She does not routinely follow with a cardiologist     She did not receive the COVID vaccine, however she denies any recent illnesses or viral symptoms  She is very active at baseline, however reports a 6 month history of exertional fatigue and dyspnea specifically noted w/ walking long distances and going up inclines  She does not report any exertional CP, or palpitations       She presented to the [facility] ED on 9/12/21 w/ a 2 day hx of CP described as a "burning sensation" in the mid sternal region w/ associated left arm numbness/heaviness  Initial 12 lead ECG demonstrated NSR, incomplete RBBB, LVH w associated / ST-T wave repolarization abnormalities  Initial troponin level elevated at 7 25 peaking at 16 and now down trending  After receiving IV morphine and nitro paste in the ED symptoms improved  In the ED she received full dose aspirin, was loaded with Brilinta 180 mg, and initiated on a IV heparin GTT given concern for ACS  Hemodynamics on admission  -Temp 97  F, HR 90, RR 18, /100, sat 97%  Lab data on admission  -Na+ 142, K+ 4 2, chloride 106, CO2 26, anion gap 10, BUN 10, creatinine 0 68, glucose 86, calcium 9 7, AST 47, ALT 38, alk phos 63, total protein 7 3, albumin 4 0, WBC 6 91, HGB 12 0, PLT   -Trop elevation - 7 25 -- 13 3 -- 16 -- 13 8; NT-proBNP 366  Imaging  -CXR; pending imaging results    Cardiology was consulted for further management / treatment recommendations       Family History:   Family History   Problem Relation Age of Onset    No Known Problems Mother     No Known Problems Father     No Known Problems Sister     No Known Problems Maternal Grandmother     No Known Problems Maternal Grandfather     No Known Problems Paternal Grandmother     No Known Problems Paternal Grandfather     No Known Problems Maternal Aunt     No Known Problems Maternal Aunt     No Known Problems Maternal Aunt     No Known Problems Maternal Aunt     No Known Problems Paternal Aunt      Historical Information   Past Medical History:   Diagnosis Date    Chronic kidney disease      Past Surgical History:   Procedure Laterality Date    HYSTERECTOMY  10/23/2016    Dr Alan Jimenez &ENRIQUEWELL STENT INSRT Left 6/1/2016    Procedure: CYSTOSCOPY; URETEROSCOPY; HOLMIUM LASER LITHOTRIPSY; BASKET STONE EXTRACTION; RETROGRADE PYELOGRAM; STENT PLACEMENT ;  Surgeon: Tayo Santoro MD;  [facility];  Service: Urology    ND CYSTO/URETERO W/LITHOTRIPSY &INDWELL STENT INSRT Left 4/22/2016    Procedure: CYSTOSCOPY, INSERTION STENT URETERAL;  Surgeon: Tayo Santoro MD;  [facility];  Service: Urology    TUBAL LIGATION       Social History   Social History     Substance and Sexual Activity   Alcohol Use No     Social History     Substance and Sexual Activity   Drug Use No     Social History     Tobacco Use   Smoking Status Never Smoker   Smokeless Tobacco Never Used     Family History:   Family History   Problem Relation Age of Onset    No Known Problems Mother     No Known Problems Father     No Known Problems Sister     No Known Problems Maternal Grandmother     No Known Problems Maternal Grandfather     No Known Problems Paternal Grandmother     No Known Problems Paternal Grandfather     No Known Problems Maternal Aunt     No Known Problems Maternal Aunt     No Known Problems Maternal Aunt     No Known Problems Maternal Aunt     No Known Problems Paternal Aunt        Review of Systems:  Review of Systems   Constitutional: Negative for chills, fatigue and fever  Respiratory: Negative for cough, chest tightness and shortness of breath  Cardiovascular: Negative for chest pain, palpitations and leg swelling  Gastrointestinal: Negative for abdominal pain  Neurological: Negative for dizziness, light-headedness and headaches  All other systems reviewed and are negative            Scheduled Meds:  Current Facility-Administered Medications   Medication Dose Route Frequency Provider Last Rate    aspirin  324 mg Oral Once Kyle DANIELLE Stuart      heparin (porcine)  3-20 Units/kg/hr (Order-Specific) Intravenous Titrated Ny Bai MD 16 Units/kg/hr (09/13/21 0140)    heparin (porcine)  2,000 Units Intravenous Q1H PRN Ny Bai MD      heparin (porcine)  4,000 Units Intravenous Q1H PRN Hitesh Tesfaye Chalo Diaz MD      Labetalol HCl  5 mg Intravenous Q6H PRN Evorn Purpura, DO      metoprolol tartrate  25 mg Oral Q12H [facility] Arsh Cameron MD      nitroglycerin  0 4 mg Sublingual Q5 Min PRN Arsh Cameron MD       Continuous Infusions:heparin (porcine), 3-20 Units/kg/hr (Order-Specific), Last Rate: 16 Units/kg/hr (09/13/21 0140)      PRN Meds: heparin (porcine)    heparin (porcine)    Labetalol HCl    nitroglycerin  all current active meds have been reviewed and current meds:   Current Facility-Administered Medications   Medication Dose Route Frequency    aspirin chewable tablet 324 mg  324 mg Oral Once    heparin (porcine) 25,000 units in 0 45% NaCl 250 mL infusion (premix)  3-20 Units/kg/hr (Order-Specific) Intravenous Titrated    heparin (porcine) injection 2,000 Units  2,000 Units Intravenous Q1H PRN    heparin (porcine) injection 4,000 Units  4,000 Units Intravenous Q1H PRN    Labetalol HCl (NORMODYNE) injection 5 mg  5 mg Intravenous Q6H PRN    metoprolol tartrate (LOPRESSOR) tablet 25 mg  25 mg Oral Q12H [facility]    nitroglycerin (NITROSTAT) SL tablet 0 4 mg  0 4 mg Sublingual Q5 Min PRN       No Known Allergies    Objective   Vitals: Blood pressure 111/55, pulse 58, temperature 97 9 °F (36 6 °C), temperature source Oral, resp  rate 18, height 5' 3" (1 6 m), weight 78 5 kg (173 lb), SpO2 96 %  , Body mass index is 30 65 kg/m² ,   Orthostatic Blood Pressures      Most Recent Value   Blood Pressure  111/55 filed at 09/13/2021 0430   Patient Position - Orthostatic VS  Lying filed at 09/13/2021 0230          No intake or output data in the 24 hours ending 09/13/21 0807    Invasive Devices     Peripheral Intravenous Line            Peripheral IV 09/12/21 Left Antecubital <1 day    Peripheral IV 09/12/21 Right Forearm <1 day              Physical Exam  Physical Exam  Vitals and nursing note reviewed  Constitutional:       General: She is not in acute distress  Appearance: Normal appearance   She is obese  She is not ill-appearing or diaphoretic  HENT:      Head: Normocephalic and atraumatic  Mouth/Throat:      Mouth: Mucous membranes are moist    Eyes:      General: No scleral icterus  Cardiovascular:      Rate and Rhythm: Normal rate and regular rhythm  Pulses: Normal pulses  Heart sounds: Normal heart sounds  No murmur heard  Pulmonary:      Effort: Pulmonary effort is normal       Breath sounds: Normal breath sounds  No wheezing or rales  Abdominal:      Palpations: Abdomen is soft  Musculoskeletal:      Cervical back: Neck supple  Right lower leg: No edema  Left lower leg: No edema  Skin:     General: Skin is warm and dry  Capillary Refill: Capillary refill takes less than 2 seconds  Neurological:      General: No focal deficit present  Mental Status: She is alert and oriented to person, place, and time     Psychiatric:         Mood and Affect: Mood normal          Lab Results:   Recent Results (from the past 24 hour(s))   CBC and differential    Collection Time: 09/12/21  4:34 PM   Result Value Ref Range    WBC 6 91 4 31 - 10 16 Thousand/uL    RBC 4 94 3 81 - 5 12 Million/uL    Hemoglobin 12 0 11 5 - 15 4 g/dL    Hematocrit 38 9 34 8 - 46 1 %    MCV 79 (L) 82 - 98 fL    MCH 24 3 (L) 26 8 - 34 3 pg    MCHC 30 8 (L) 31 4 - 37 4 g/dL    RDW 15 9 (H) 11 6 - 15 1 %    MPV 10 1 8 9 - 12 7 fL    Platelets 877 111 - 001 Thousands/uL    nRBC 0 /100 WBCs    Neutrophils Relative 59 43 - 75 %    Immat GRANS % 1 0 - 2 %    Lymphocytes Relative 29 14 - 44 %    Monocytes Relative 9 4 - 12 %    Eosinophils Relative 1 0 - 6 %    Basophils Relative 1 0 - 1 %    Neutrophils Absolute 4 11 1 85 - 7 62 Thousands/µL    Immature Grans Absolute 0 04 0 00 - 0 20 Thousand/uL    Lymphocytes Absolute 1 97 0 60 - 4 47 Thousands/µL    Monocytes Absolute 0 64 0 17 - 1 22 Thousand/µL    Eosinophils Absolute 0 10 0 00 - 0 61 Thousand/µL    Basophils Absolute 0 05 0 00 - 0 10 Thousands/µL Comprehensive metabolic panel    Collection Time: 09/12/21  4:34 PM   Result Value Ref Range    Sodium 142 136 - 145 mmol/L    Potassium 4 2 3 5 - 5 3 mmol/L    Chloride 106 100 - 108 mmol/L    CO2 26 21 - 32 mmol/L    ANION GAP 10 4 - 13 mmol/L    BUN 10 5 - 25 mg/dL    Creatinine 0 68 0 60 - 1 30 mg/dL    Glucose 86 65 - 140 mg/dL    Calcium 9 7 8 3 - 10 1 mg/dL    AST 47 (H) 5 - 45 U/L    ALT 38 12 - 78 U/L    Alkaline Phosphatase 63 46 - 116 U/L    Total Protein 7 3 6 4 - 8 2 g/dL    Albumin 4 0 3 5 - 5 0 g/dL    Total Bilirubin 0 25 0 20 - 1 00 mg/dL    eGFR 91 ml/min/1 73sq m   Troponin I    Collection Time: 09/12/21  4:34 PM   Result Value Ref Range    Troponin I 3 04 (H) <=0 04 ng/mL   D-dimer, quantitative    Collection Time: 09/12/21  4:34 PM   Result Value Ref Range    D-Dimer, Quant 1 17 (H) <0 50 ug/ml FEU   NT-BNP PRO    Collection Time: 09/12/21  4:34 PM   Result Value Ref Range    NT-proBNP 366 (H) <125 pg/mL   Protime-INR    Collection Time: 09/12/21  4:34 PM   Result Value Ref Range    Protime 12 7 11 6 - 14 5 seconds    INR 0 95 0 84 - 1 19   APTT six (6) hours after Heparin bolus/drip initiation or dosing change    Collection Time: 09/12/21  6:08 PM   Result Value Ref Range    PTT 26 23 - 37 seconds   Troponin I    Collection Time: 09/12/21  7:40 PM   Result Value Ref Range    Troponin I 7 25 (H) <=0 04 ng/mL   TSH, 3rd generation    Collection Time: 09/12/21 10:34 PM   Result Value Ref Range    TSH 3RD GENERATON 6 675 (H) 0 358 - 3 740 uIU/mL   Troponin I    Collection Time: 09/12/21 10:34 PM   Result Value Ref Range    Troponin I 13 36 (H) <=0 04 ng/mL   APTT six (6) hours after Heparin bolus/drip initiation or dosing change    Collection Time: 09/13/21 12:31 AM   Result Value Ref Range    PTT 35 23 - 37 seconds   Troponin I    Collection Time: 09/13/21  1:43 AM   Result Value Ref Range    Troponin I 16 01 (H) <=0 04 ng/mL   Troponin I    Collection Time: 09/13/21  5:22 AM   Result Value Ref Range    Troponin I 13 78 (H) <=0 04 ng/mL     Imaging: I have personally reviewed pertinent reports  and I have personally reviewed pertinent films in PACS    Code Status: LVL 1 Full Code    [facility] Everywhere [facility] reviewed: Yes    * Please Note: Fluency DirectDictation voice to text software may have been used in the creation of this document   **

## 2021-09-13 NOTE — ASSESSMENT & PLAN NOTE
Lab Results   Component Value Date    TROPONINI 11 01 (H) 09/13/2021    TROPONINI 13 78 (H) 09/13/2021    TROPONINI 16 01 (H) 09/13/2021     · Stop trending trop as it is now down trending

## 2021-09-13 NOTE — ASSESSMENT & PLAN NOTE
Lab Results   Component Value Date    TROPONINI 7 25 (H) 09/12/2021    TROPONINI 3 04 (H) 09/12/2021    TROPONINI <0 02 04/23/2016   · On presentation troponin 3 04  · Trend troponin

## 2021-09-14 VITALS
DIASTOLIC BLOOD PRESSURE: 64 MMHG | HEIGHT: 63 IN | BODY MASS INDEX: 30.65 KG/M2 | OXYGEN SATURATION: 95 % | HEART RATE: 63 BPM | TEMPERATURE: 98.4 F | WEIGHT: 173 LBS | RESPIRATION RATE: 16 BRPM | SYSTOLIC BLOOD PRESSURE: 137 MMHG

## 2021-09-14 PROBLEM — D64.9 ANEMIA: Status: ACTIVE | Noted: 2021-09-14

## 2021-09-14 PROBLEM — Z95.5 S/P DRUG ELUTING CORONARY STENT PLACEMENT: Status: ACTIVE | Noted: 2021-09-14

## 2021-09-14 PROBLEM — R79.89 ABNORMAL TSH: Status: ACTIVE | Noted: 2021-09-14

## 2021-09-14 PROBLEM — I16.0 HYPERTENSIVE URGENCY: Status: RESOLVED | Noted: 2021-09-12 | Resolved: 2021-09-14

## 2021-09-14 PROBLEM — R77.8 ELEVATED TROPONIN: Status: RESOLVED | Noted: 2021-09-12 | Resolved: 2021-09-14

## 2021-09-14 PROBLEM — R79.89 ELEVATED TROPONIN: Status: RESOLVED | Noted: 2021-09-12 | Resolved: 2021-09-14

## 2021-09-14 LAB
ANION GAP SERPL CALCULATED.3IONS-SCNC: 9 MMOL/L (ref 4–13)
ATRIAL RATE: 58 BPM
ATRIAL RATE: 66 BPM
ATRIAL RATE: 73 BPM
BUN SERPL-MCNC: 9 MG/DL (ref 5–25)
CALCIUM SERPL-MCNC: 8.8 MG/DL (ref 8.3–10.1)
CHLORIDE SERPL-SCNC: 106 MMOL/L (ref 100–108)
CO2 SERPL-SCNC: 24 MMOL/L (ref 21–32)
CREAT SERPL-MCNC: 0.84 MG/DL (ref 0.6–1.3)
ERYTHROCYTE [DISTWIDTH] IN BLOOD BY AUTOMATED COUNT: 16.6 % (ref 11.6–15.1)
GFR SERPL CREATININE-BSD FRML MDRD: 72 ML/MIN/1.73SQ M
GLUCOSE SERPL-MCNC: 105 MG/DL (ref 65–140)
HCT VFR BLD AUTO: 33.2 % (ref 34.8–46.1)
HGB BLD-MCNC: 10 G/DL (ref 11.5–15.4)
MCH RBC QN AUTO: 24.1 PG (ref 26.8–34.3)
MCHC RBC AUTO-ENTMCNC: 30.1 G/DL (ref 31.4–37.4)
MCV RBC AUTO: 80 FL (ref 82–98)
P AXIS: 57 DEGREES
P AXIS: 62 DEGREES
P AXIS: 78 DEGREES
PLATELET # BLD AUTO: 256 THOUSANDS/UL (ref 149–390)
PMV BLD AUTO: 10.6 FL (ref 8.9–12.7)
POTASSIUM SERPL-SCNC: 3.7 MMOL/L (ref 3.5–5.3)
PR INTERVAL: 164 MS
PR INTERVAL: 166 MS
PR INTERVAL: 166 MS
QRS AXIS: 30 DEGREES
QRS AXIS: 37 DEGREES
QRS AXIS: 40 DEGREES
QRSD INTERVAL: 106 MS
QRSD INTERVAL: 106 MS
QRSD INTERVAL: 108 MS
QT INTERVAL: 428 MS
QT INTERVAL: 450 MS
QT INTERVAL: 470 MS
QTC INTERVAL: 454 MS
QTC INTERVAL: 462 MS
QTC INTERVAL: 465 MS
RBC # BLD AUTO: 4.15 MILLION/UL (ref 3.81–5.12)
SODIUM SERPL-SCNC: 139 MMOL/L (ref 136–145)
T WAVE AXIS: 148 DEGREES
T WAVE AXIS: 149 DEGREES
T WAVE AXIS: 171 DEGREES
T4 FREE SERPL-MCNC: 0.64 NG/DL (ref 0.76–1.46)
VENTRICULAR RATE: 56 BPM
VENTRICULAR RATE: 64 BPM
VENTRICULAR RATE: 70 BPM
WBC # BLD AUTO: 8.59 THOUSAND/UL (ref 4.31–10.16)

## 2021-09-14 PROCEDURE — 93010 ELECTROCARDIOGRAM REPORT: CPT | Performed by: INTERNAL MEDICINE

## 2021-09-14 PROCEDURE — 84439 ASSAY OF FREE THYROXINE: CPT | Performed by: INTERNAL MEDICINE

## 2021-09-14 PROCEDURE — 80048 BASIC METABOLIC PNL TOTAL CA: CPT

## 2021-09-14 PROCEDURE — 85027 COMPLETE CBC AUTOMATED: CPT

## 2021-09-14 PROCEDURE — 99239 HOSP IP/OBS DSCHRG MGMT >30: CPT | Performed by: INTERNAL MEDICINE

## 2021-09-14 PROCEDURE — 99232 SBSQ HOSP IP/OBS MODERATE 35: CPT | Performed by: INTERNAL MEDICINE

## 2021-09-14 RX ORDER — NITROGLYCERIN 0.4 MG/1
0.4 TABLET SUBLINGUAL
Qty: 15 TABLET | Refills: 0 | Status: SHIPPED | OUTPATIENT
Start: 2021-09-14

## 2021-09-14 RX ORDER — ASPIRIN 81 MG/1
81 TABLET, CHEWABLE ORAL DAILY
Qty: 30 TABLET | Refills: 0 | Status: SHIPPED | OUTPATIENT
Start: 2021-09-14 | End: 2022-05-18

## 2021-09-14 RX ORDER — ATORVASTATIN CALCIUM 40 MG/1
40 TABLET, FILM COATED ORAL
Qty: 30 TABLET | Refills: 0 | Status: CANCELLED | OUTPATIENT
Start: 2021-09-14 | End: 2021-10-14

## 2021-09-14 RX ORDER — NITROGLYCERIN 0.4 MG/1
0.4 TABLET SUBLINGUAL
Qty: 15 TABLET | Refills: 0 | Status: CANCELLED | OUTPATIENT
Start: 2021-09-14 | End: 2021-09-29

## 2021-09-14 RX ORDER — ASPIRIN 81 MG/1
81 TABLET, CHEWABLE ORAL DAILY
Qty: 30 TABLET | Refills: 0 | Status: CANCELLED | OUTPATIENT
Start: 2021-09-14 | End: 2021-10-14

## 2021-09-14 RX ORDER — CLOPIDOGREL BISULFATE 75 MG/1
75 TABLET ORAL DAILY
Status: DISCONTINUED | OUTPATIENT
Start: 2021-09-15 | End: 2021-09-14 | Stop reason: HOSPADM

## 2021-09-14 RX ORDER — ATORVASTATIN CALCIUM 40 MG/1
40 TABLET, FILM COATED ORAL
Qty: 30 TABLET | Refills: 0 | Status: SHIPPED | OUTPATIENT
Start: 2021-09-14 | End: 2021-09-20 | Stop reason: SDUPTHER

## 2021-09-14 RX ORDER — CLOPIDOGREL BISULFATE 75 MG/1
600 TABLET ORAL ONCE
Status: DISCONTINUED | OUTPATIENT
Start: 2021-09-14 | End: 2021-09-14 | Stop reason: HOSPADM

## 2021-09-14 RX ADMIN — TICAGRELOR 90 MG: 90 TABLET ORAL at 07:36

## 2021-09-14 RX ADMIN — METOPROLOL TARTRATE 25 MG: 25 TABLET, FILM COATED ORAL at 07:36

## 2021-09-14 NOTE — ASSESSMENT & PLAN NOTE
Presents with chest pain that started the previous night was progressively getting worse  At peak pain was 10/10 with radiation down left arm  EKG similar to prior  Troponin elevated concerning for ACS  ASA, Brilinta given in the ED  Heart score of 8 points  SERGEY score 5   troponins peaked at 16  telemetry- has been benign while on the floors  Overnight, had some tachycardia with nonspecific st changes  Echo showed a grade 1 diastolic dysfunction with an EF of 60%  Cardiac cath 9/13: Mid LAD: There was a 90 % stenosis  This lesion is a likely culprit for the patient's recent myocardial infarction  Mid circumflex: There was a 50 % stenosis  Distal circumflex: There was a 90 % stenosis  Proximal RCA: There was a 50 % stenosis  Mid RCA: There was a 30 % stenosis    LAD and distal circ were stented    Plan  DAPT x1 year, ASA indefinitely

## 2021-09-14 NOTE — CASE MANAGEMENT
CM approached by [facility] resident who requested price check for Brilinta  [facility] resident met with patient to obtain pharmacy preference and sent Rx to [facility] [address]  CM called [facility] and spoke with Rinku Jennings  She reports that patient's insurance rejected the Rx as their pharmacy is not in network with her [facility] plan  She confirms patient did fill with them last year but her plan must have changed  [facility]  ID: [id number]  RxBIN: [id number]  PCN: [id number]  Group: CORRIE OLMOS met with patient at bedside to introduce self and role  She is alert and oriented and sitting on the couch  Patient is independent with all ADLs and mobility  She stated she did fill at [facility] last year but assumes she can fill at any pharmacy, including [facility] [address]  CM relayed same to [facility] via TT and asked them to resend Rx so that CM can follow up on cost  CM will continue to follow

## 2021-09-14 NOTE — PLAN OF CARE
Problem: CARDIOVASCULAR - ADULT  Goal: Maintains optimal cardiac output and hemodynamic stability  Description: INTERVENTIONS:  - Monitor I/O, vital signs and rhythm  - Monitor for S/S and trends of decreased cardiac output  - Administer and titrate ordered vasoactive medications to optimize hemodynamic stability  - Assess quality of pulses, skin color and temperature  - Assess for signs of decreased coronary artery perfusion  - Instruct patient to report change in severity of symptoms  9/14/2021 1059 by Alan Sanchez RN  Outcome: Adequate for Discharge  9/14/2021 0911 by Alan Sanchez RN  Outcome: Progressing  Goal: Absence of cardiac dysrhythmias or at baseline rhythm  Description: INTERVENTIONS:  - Continuous cardiac monitoring, vital signs, obtain 12 lead EKG if ordered  - Administer antiarrhythmic and heart rate control medications as ordered  - Monitor electrolytes and administer replacement therapy as ordered  9/14/2021 1059 by Alan Sanchez RN  Outcome: Adequate for Discharge  9/14/2021 0911 by Alan Sanchez RN  Outcome: Progressing     Problem: HEMATOLOGIC - ADULT  Goal: Maintains hematologic stability  Description: INTERVENTIONS  - Assess for signs and symptoms of bleeding or hemorrhage  - Monitor labs  - Administer supportive blood products/factors as ordered and appropriate  9/14/2021 1059 by Alan Sanchez RN  Outcome: Adequate for Discharge  9/14/2021 0911 by Alan Sanchez RN  Outcome: Progressing     Problem: PAIN - ADULT  Goal: Verbalizes/displays adequate comfort level or baseline comfort level  Description: Interventions:  - Encourage patient to monitor pain and request assistance  - Assess pain using appropriate pain scale  - Administer analgesics based on type and severity of pain and evaluate response  - Implement non-pharmacological measures as appropriate and evaluate response  - Consider cultural and social influences on pain and pain management  - Notify physician/advanced practitioner if interventions unsuccessful or patient reports new pain  9/14/2021 1059 by Rose Mary Andres RN  Outcome: Adequate for Discharge  9/14/2021 0911 by Rose Mary Andres RN  Outcome: Progressing

## 2021-09-14 NOTE — CASE MANAGEMENT
[facility] and spoke with Pharmacist re: Glorious Blander cost  CM was informed that the copay cost for patient's Brilinta is $[phone] 44  Pharmacist unable to tell CM if any applies to her deductible  He provided insurance plan phone # as [phone]  CM reached out to [facility] at the provided number and got through to the pharmacy provider; they transferred CM to customer service at [phone]  CM spoke with Mae Tilley  She reports patient's deductible is $445 00  Mae Tliley is unable to tell CM what patient has spent so far but stated that the full $435 44 will apply to the deductible  Once her deductible is met, her cost share starts and she's responsible for 40% of the retail cost, which will be approximately $174 57/month  AMARILIS met with patient at bedside to review pricing  She reports since this is what's recommended by the Cardiology team, she is able to afford same and chooses to discharge on Glorious Blander  Free 30 day coupon provided and CM instructed patient to provide this to the pharmacy at [facility] and RN aware of patient preference for Brilinta  Patient stated she'll text her  to come and pick her up once she has her discharge papers, as he is at work  No other CM needs identified

## 2021-09-14 NOTE — ASSESSMENT & PLAN NOTE
Recent Labs     09/12/21  1634 09/14/21  0448   HGB 12 0 10 0*     Likely hemodilutional due to patient receiving fluids

## 2021-09-14 NOTE — ASSESSMENT & PLAN NOTE
TSH on admission was 6 675     Free T4 has been sent, patient was instructed to follow up the result with her PCP outpatient

## 2021-09-14 NOTE — PROGRESS NOTES
Cardiology Progress Note - Abdulaziz Dyer 78 y o  female MRN: [id number]    Unit/Bed#: S -01 Encounter: [id number]      Assessment:  1  NSTEMI type 1 s/p THALIA to mid LAD, distal LCx  2   Preserved LV systolic function  3  Benign essential hypertension   4  Dyslipidemia   5  Obesity - BMI 31  6  History of endometrial cancer    Plan:  1  S/p THALIA to mid LAD/LCx  2   LV function preserved  3  Continue DAPT - need to investigate cost of Brilinta  4  Blood pressure control acceptable on beta-blocker   5  Statin initiated - lipids noted from July 6  Stable for discharge from cardiac perspective once price check complete - will arrange outpatient follow-up    Subjective:   Patient seen and examined  No significant events overnight  Objective:     Vitals: Blood pressure 137/64, pulse 63, temperature 98 4 °F (36 9 °C), temperature source Oral, resp  rate 16, height 5' 3" (1 6 m), weight 78 5 kg (173 lb), SpO2 95 %  , Body mass index is 30 65 kg/m² ,   Orthostatic Blood Pressures      Most Recent Value   Blood Pressure  137/64 filed at 09/14/2021 0630   Patient Position - Orthostatic VS  Lying filed at 09/14/2021 0630            Intake/Output Summary (Last 24 hours) at 9/14/2021 0731  Last data filed at 9/13/2021 1900  Gross per 24 hour   Intake 180 ml   Output --   Net 180 ml         Physical Exam:    GEN: Abdulaziz Dyer appears well, alert and oriented x 3, pleasant and cooperative   HEENT: pupils equal, round, and reactive to light; extraocular muscles intact  NECK: supple, no carotid bruits   HEART: regular rhythm, normal S1 and S2, no murmurs, clicks, gallops or rubs   LUNGS: clear to auscultation bilaterally; no wheezes, rales, or rhonchi   ABDOMEN: normal bowel sounds, soft, no tenderness, no distention  EXTREMITIES: 2+ right radial pulse   NEURO: no focal findings   SKIN: normal without suspicious lesions on exposed skin    Medications:      Current Facility-Administered Medications:    atorvastatin (LIPITOR) tablet 40 mg, 40 mg, Oral, Daily With DANIELLE Pratt, 40 mg at 09/13/21 1701    heparin (porcine) 25,000 units in 0 45% NaCl 250 mL infusion (premix), 3-20 Units/kg/hr (Order-Specific), Intravenous, Titrated, Manny Matias MD, Stopped at 09/13/21 1540    heparin (porcine) injection 2,000 Units, 2,000 Units, Intravenous, Q1H PRN, Manny Matias MD, 2,000 Units at 09/13/21 0931    heparin (porcine) injection 4,000 Units, 4,000 Units, Intravenous, Q1H PRN, Manny Matias MD, 4,000 Units at 09/13/21 0137    Labetalol HCl (NORMODYNE) injection 5 mg, 5 mg, Intravenous, Q6H PRN, Hailey Medel DO    metoprolol tartrate (LOPRESSOR) tablet 25 mg, 25 mg, Oral, Q12H [address], Manny Matias MD, 25 mg at 09/13/21 2134    nitroglycerin (NITROSTAT) SL tablet 0 4 mg, 0 4 mg, Sublingual, Q5 Min PRN, Manny Matias MD    sodium chloride 0 9 % infusion, 100 mL/hr, Intravenous, Continuous, Malvina Schirmer, DO, Last Rate: 100 mL/hr at 09/13/21 1649, 100 mL/hr at 09/13/21 1649    ticagrelor (BRILINTA) tablet 90 mg, 90 mg, Oral, Q12H [address], [facility] DANIELLE Castillo, 90 mg at 09/13/21 2134     Labs & Results:    Results from last 7 days   Lab Units 09/13/21  0750 09/13/21  0522 09/13/21  0143   TROPONIN I ng/mL 11 01* 13 78* 16 01*     Results from last 7 days   Lab Units 09/14/21  0448 09/12/21  1634   WBC Thousand/uL 8 59 6 91   HEMOGLOBIN g/dL 10 0* 12 0   HEMATOCRIT % 33 2* 38 9   PLATELETS Thousands/uL 256 287         Results from last 7 days   Lab Units 09/14/21  0448 09/12/21  1634   POTASSIUM mmol/L 3 7 4 2   CHLORIDE mmol/L 106 106   CO2 mmol/L 24 26   BUN mg/dL 9 10   CREATININE mg/dL 0 84 0 68   CALCIUM mg/dL 8 8 9 7   ALK PHOS U/L  --  63   ALT U/L  --  38   AST U/L  --  47*     Results from last 7 days   Lab Units 09/13/21  0750 09/13/21  0031 09/12/21  1808 09/12/21  1634   INR  1 12  --   --  0 95   PTT seconds 55* 35 26  --      Results from last 7 days Lab Units 09/12/21  2234   MAGNESIUM mg/dL 2 1         Counseling / Coordination of Care  Total floor / unit time spent today 25 minutes  Greater than 50% of total time was spent with the patient and / or family counseling and / or coordination of care

## 2021-09-18 NOTE — PROGRESS NOTES
Cardiology  MI Follow Up   Office Visit Note  Magdiel Cuellar   78 y o    female   MRN: [id number]  [address]  [address]  [place] [address] [id number] [phone]    PCP: Belkys Cruz MD  Cardiologist: Will be Dr Pravin Lala            Summary of recommendations  Heart healthy diet  Educational information provided  Fasting lipid profile 8 weeks- ordered by her PCP  CBC, BMP  Cardiac rehab has been prescribed recommended  Medical adherence to dual antiplatelet therapy reinforced  Recommend Covid 19 vaccination  F/u with her PCP re: anemia and abnormal TSH  Follow up will be scheduled with Dr Pravin Lala 6- 8 weeks        Assessment/plan  CAD,NSTEMI type 1 s/p THALAI to mid LAD, distal LCx , otherwise 50% prox RCA, 50% mid LCx lesion, medically managed   Trop to 16  Preserved LV fxn  Adm 9/12-9/14/21   On aspirin, Brilinta  a high intensity statin, beta-blocker     Cardiac rehab has been prescribed and recommended   Adherence to dual antiplatelet therapy reinforced  Hypertension, essential   BP  118/72 on metoprolol tartrate 25 mg Q 12   Avoid NSAIDs; avoid decongestants; Low-sodium diet; Home blood pressure monitoring periodically  Hyperlipidemia,   July 2021:    Non HDL  173  Started on atorvastatin 40 mg daily  Reassess lipids 8 weeks goal LDL less than 70  Obesity, BMI 31  History of endometrial cancer  Pre diabetes 9/12/21: HgA1C 6 4  Fe deficient anemia  Follow CBC; follow up with her PCP  Cardiac testing   TTE  9/13/21  EF 60%  No RWMA  Grade 1 DD  RV normal size and fxn  Mild MR  Mild TR   Cardiac catheterization 9/13/21  There was 2-vessel coronary artery disease  Mid LAD: There was a 90 % stenosis  This lesion is a likely culprit for the patient's recent myocardial infarction  Mid circumflex: There was a 50 % stenosis  Distal circumflex: There was a 90 % stenosis  Proximal RCA: There was a 50 % stenosis    Mid RCA: There was a 30 % stenosis  Intervention: PCI mid LAD and distal circumflex with THALIA                   HPI  Narda Mayers is a 77 yo female with, with dyslipidemia, obesity, and endometrial CA s/p hysterectomy  She is a lifelong nonsmoker  Adm 9/12-9/14/21  CC:  chest pain  She felt burning chest pain which she thought was reflux  She took Tums without help  The pain radiated to her arm  Due to persistent sx, she presented to the ED  She noted she was very active at baseline, however reports a 6 month history of exertional fatigue and dyspnea,walking long distances and going up inclines  Her troponin was elevated  Her BP was markedly elevated, 203/100 ; initial 12 lead ECG demonstrated NSR, incomplete RBBB, LVH w associated / ST-T wave repolarization abnormalities  She was managed with the ACS protocol with a heparin drip, was also given Brilinta in the ED  Left heart catheterization was performed September 13, demonstrating a 90% stenosis of the mid LAD and distal circumflex, for which she was successfully treated with drug-eluting stents  She had residual 50% proximal RCA disease, 50% mid circumflex disease, medically managed  An echocardiogram demonstrated preserved LV function no significant valve disease  She was started on DAPT with ASA and Brilinta  She was started on a high intensity statin for a baseline LDL of 132,  In July 2021  Ongoing RF modification was recommended  She had a mildly elevated TSH and anemia for which ongoing OP followup was recommended    9/20/21  Hospital follow-up  She is feeling well  She denies chest pain, shortness of breath, lightheadedness or dizziness  She does have mild ARAIZA  She is compliant with her medications  She followed up with her PCP  She has follow-up lipids in 8 weeks     We reviewed her coronary anatomy  I showed her a video of the stented coronary artery  She is compliant with dual antiplatelet therapy        Blood pressure is satisfactory    I recommended cardiac rehab  We discussed importance of heart healthy diet  She is agreeable to getting a CBC, nonfasting BMP today  I will call if this  Is abnormal     Her medications were refilled  She has a prescription for nitroglycerin  I reviewed with her how to use it  I have spent 40 minutes with Patient  today in which greater than 50% of this time was spent in counseling/coordination of care regarding Intructions for management, Patient and family education, Importance of tx compliance and Risk factor reductions  Assessment  Diagnoses and all orders for this visit:    Hospital discharge follow-up    NSTEMI (non-ST elevated myocardial infarction) ([facility] [address] )  -     ticagrelor (BRILINTA) 90 MG; Take 1 tablet (90 mg total) by mouth every 12 (twelve) hours  -     metoprolol tartrate (LOPRESSOR) 25 mg tablet; Take 1 tablet (25 mg total) by mouth every 12 (twelve) hours  -     atorvastatin (LIPITOR) 40 mg tablet; Take 1 tablet (40 mg total) by mouth daily with dinner    S/P drug eluting coronary stent placement  -     CBC and Platelet; Future  -     Basic metabolic panel; Future    Mixed hyperlipidemia  -     Lipid panel; Future    Class 1 obesity due to excess calories with body mass index (BMI) of 30 0 to 30 9 in adult, unspecified whether serious comorbidity present    Dyspnea, unspecified type   -     CBC and Platelet; Future          Past Medical History:   Diagnosis Date    Chronic kidney disease     Elevated troponin 9/12/2021    Hypertensive urgency 9/12/2021       Review of Systems   Constitutional: Negative for chills  Cardiovascular: Positive for dyspnea on exertion  Negative for chest pain, claudication, cyanosis, irregular heartbeat, leg swelling, near-syncope, orthopnea, palpitations, paroxysmal nocturnal dyspnea and syncope  Respiratory: Negative for cough and shortness of breath  Gastrointestinal: Negative for heartburn and nausea     Neurological: Negative for dizziness, focal weakness, headaches, light-headedness and weakness  All other systems reviewed and are negative  No Known Allergies    Current Outpatient Medications:     aspirin 81 mg chewable tablet, Chew 1 tablet (81 mg total) daily, Disp: 30 tablet, Rfl: 0    atorvastatin (LIPITOR) 40 mg tablet, Take 1 tablet (40 mg total) by mouth daily with dinner, Disp: 30 tablet, Rfl: 11    ferrous sulfate 325 (65 Fe) mg tablet, Take 325 mg by mouth, Disp: , Rfl:     metoprolol tartrate (LOPRESSOR) 25 mg tablet, Take 1 tablet (25 mg total) by mouth every 12 (twelve) hours, Disp: 60 tablet, Rfl: 11    nitroglycerin (NITROSTAT) 0 4 mg SL tablet, Place 1 tablet (0 4 mg total) under the tongue every 5 (five) minutes as needed for chest pain (if pain free after sl ng then nittopaste), Disp: 15 tablet, Rfl: 0    ticagrelor (BRILINTA) 90 MG, Take 1 tablet (90 mg total) by mouth every 12 (twelve) hours, Disp: 60 tablet, Rfl: 11        Social History     Socioeconomic History    Marital status: /Civil Union     Spouse name: Not on file    Number of children: Not on file    Years of education: Not on file    Highest education level: Not on file   Occupational History    Not on file   Tobacco Use    Smoking status: Never Smoker    Smokeless tobacco: Never Used   Vaping Use    Vaping Use: Never used   Substance and Sexual Activity    Alcohol use: No    Drug use: No    Sexual activity: Not on file   Other Topics Concern    Not on file   Social History Narrative    Not on file     Social Determinants of Health     Financial Resource Strain:     Difficulty of Paying Living Expenses:    Food Insecurity:     Worried About Running Out of Food in the Last Year:     Ran Out of Food in the Last Year:    Transportation Needs:     Lack of Transportation (Medical):      Lack of Transportation (Non-Medical):    Physical Activity:     Days of Exercise per Week:     Minutes of Exercise per Session:    Stress:     Feeling of Stress : Social Connections:     Frequency of Communication with Friends and Family:     Frequency of Social Gatherings with Friends and Family:     Attends Sikhism Services:     Active Member of Clubs or Organizations:     Attends Club or Organization Meetings:     Marital Status:    Intimate Partner Violence:     Fear of Current or Ex-Partner:     Emotionally Abused:     Physically Abused:     Sexually Abused:        Family History   Problem Relation Age of Onset    No Known Problems Mother     No Known Problems Father     No Known Problems Sister     No Known Problems Maternal Grandmother     No Known Problems Maternal Grandfather     No Known Problems Paternal Grandmother     No Known Problems Paternal Grandfather     No Known Problems Maternal Aunt     No Known Problems Maternal Aunt     No Known Problems Maternal Aunt     No Known Problems Maternal Aunt     No Known Problems Paternal Aunt        Physical Exam  Vitals and nursing note reviewed  Constitutional:       General: She is not in acute distress  Appearance: She is not diaphoretic  HENT:      Head: Normocephalic and atraumatic  Eyes:      Conjunctiva/sclera: Conjunctivae normal    Cardiovascular:      Rate and Rhythm: Normal rate and regular rhythm  Pulses: Intact distal pulses  Heart sounds: Normal heart sounds  Pulmonary:      Effort: Pulmonary effort is normal       Breath sounds: Normal breath sounds  Abdominal:      General: Bowel sounds are normal       Palpations: Abdomen is soft  Musculoskeletal:         General: Normal range of motion  Cervical back: Normal range of motion and neck supple  Skin:     General: Skin is warm and dry  Neurological:      Mental Status: She is alert and oriented to person, place, and time  Vitals: Blood pressure 118/72, pulse 63, height 5' 2" (1 575 m), weight 76 4 kg (168 lb 6 4 oz), SpO2 99 %     Wt Readings from Last 3 Encounters:   09/20/21 76 4 kg (168 lb 6 4 oz)   09/12/21 78 5 kg (173 lb)   08/25/21 77 6 kg (171 lb)         Labs & Results:  Lab Results   Component Value Date    WBC 8 59 09/14/2021    HGB 10 0 (L) 09/14/2021    HCT 33 2 (L) 09/14/2021    MCV 80 (L) 09/14/2021     09/14/2021     No results found for: BNP  No components found for: CHEM  Troponin I   Date Value Ref Range Status   09/13/2021 11 01 (H) <=0 04 ng/mL Final     Comment:     [employer] Chemistry analyzer 99% cutoff is > 0 04 ng/mL in network labs     o cTnI 99% cutoff is useful only when applied to patients in the clinical setting of myocardial ischemia   o cTnI 99% cutoff should be interpreted in the context of clinical history, ECG findings and possibly cardiac imaging to establish correct diagnosis  o cTnI 99% cutoff may be suggestive but clearly not indicative of a coronary event without the clinical setting of myocardial ischemia      09/13/2021 13 78 (H) <=0 04 ng/mL Final     Comment:     [employer] Chemistry analyzer 99% cutoff is > 0 04 ng/mL in network labs     o cTnI 99% cutoff is useful only when applied to patients in the clinical setting of myocardial ischemia   o cTnI 99% cutoff should be interpreted in the context of clinical history, ECG findings and possibly cardiac imaging to establish correct diagnosis  o cTnI 99% cutoff may be suggestive but clearly not indicative of a coronary event without the clinical setting of myocardial ischemia      09/13/2021 16 01 (H) <=0 04 ng/mL Final     Comment:     S[employer]ens Chemistry analyzer 99% cutoff is > 0 04 ng/mL in network labs     o cTnI 99% cutoff is useful only when applied to patients in the clinical setting of myocardial ischemia   o cTnI 99% cutoff should be interpreted in the context of clinical history, ECG findings and possibly cardiac imaging to establish correct diagnosis  o cTnI 99% cutoff may be suggestive but clearly not indicative of a coronary event without the clinical setting of myocardial ischemia  Results for orders placed during the hospital encounter of 21    Echo complete with contrast if indicated    Narrative  [facility], [address] [facility]  [phone]    Transthoracic Echocardiogram  2D, M-mode, Doppler, and Color Doppler    Study date:  13-Sep-2021    Patient: Mayuri Dunlap  MR number: [id number]  Account number: [de-identified]  : 1955  Age: 78 years  Gender: Female  Status: Inpatient  Location: Bedside  Height: 63 in  Weight: 172 7 lb  BP: 111/ 55 mmHg    Indications: Acute MI    Diagnoses: I21 4 - Non-ST elevation (NSTEMI) myocardial infarction    Sonographer:  [facility]  Primary Physician:  Taylor Yip MD  Referring Physician:  DANIELLE Mckeon  Group:  [facility]  Interpreting Physician:  Amanda Gandhi DO    SUMMARY    LEFT VENTRICLE:  Systolic function was normal  Ejection fraction was estimated to be 60 %  There were no regional wall motion abnormalities  Wall thickness was mildly increased  Concentric hypertrophy was present  Doppler parameters were consistent with abnormal left ventricular relaxation (grade 1 diastolic dysfunction)  RIGHT VENTRICLE:  The size was normal   Systolic function was normal     MITRAL VALVE:  There was mild regurgitation  TRICUSPID VALVE:  There was mild regurgitation  HISTORY: PRIOR HISTORY: HTN, CKD    PROCEDURE: The procedure was performed at the bedside  This was a routine study  The transthoracic approach was used  The study included complete 2D imaging, M-mode, complete spectral Doppler, and color Doppler  The heart rate was 57 bpm,  at the start of the study  Images were obtained from the parasternal, apical, subcostal, and suprasternal notch acoustic windows  Image quality was adequate  LEFT VENTRICLE: Size was normal  Systolic function was normal  Ejection fraction was estimated to be 60 %   There were no regional wall motion abnormalities  Wall thickness was mildly increased  Concentric hypertrophy was present  DOPPLER:  Doppler parameters were consistent with abnormal left ventricular relaxation (grade 1 diastolic dysfunction)  There was no evidence of elevated ventricular filling pressure by Doppler parameters  RIGHT VENTRICLE: The size was normal  Systolic function was normal  Wall thickness was normal     LEFT ATRIUM: Size was normal     RIGHT ATRIUM: Size was normal     MITRAL VALVE: Valve structure was normal  There was normal leaflet separation  DOPPLER: The transmitral velocity was within the normal range  There was no evidence for stenosis  There was mild regurgitation  AORTIC VALVE: The valve was trileaflet  Leaflets exhibited normal thickness and normal cuspal separation  DOPPLER: Transaortic velocity was within the normal range  There was no evidence for stenosis  There was no regurgitation  TRICUSPID VALVE: The valve structure was normal  There was normal leaflet separation  DOPPLER: The transtricuspid velocity was within the normal range  There was no evidence for stenosis  There was mild regurgitation  Pulmonary artery  systolic pressure was within the normal range  PULMONIC VALVE: Leaflets exhibited normal thickness, no calcification, and normal cuspal separation  DOPPLER: The transpulmonic velocity was within the normal range  There was no regurgitation  PERICARDIUM: There was no pericardial effusion  A pericardial fat pad was present  The pericardium was normal in appearance  AORTA: The root exhibited normal size  SYSTEMIC VEINS: IVC: The inferior vena cava was normal in size and course   Respirophasic changes were normal     SYSTEM MEASUREMENT TABLES    2D  %FS: 38 67 %  Ao Diam: 3 cm  Ao asc: 3 26 cm  EDV(Teich): 106 33 ml  EF(Teich): 68 93 %  ESV(Teich): 33 04 ml  IVSd: 1 15 cm  LA Area: 18 56 cm2  LA Diam: 4 15 cm  LVEDV MOD A4C: 63 85 ml  LVEF MOD A4C: 58 87 %  LVESV MOD A4C: 26 26 ml  LVIDd: 4 78 cm  LVIDs: 2 93 cm  LVLd A4C: 7 63 cm  LVLs A4C: 7 02 cm  LVPWd: 1 14 cm  RA Area: 15 37 cm2  RVIDd: 4 08 cm  SV MOD A4C: 37 59 ml  SV(Teich): 73 3 ml    CW  AV MaxP 75 mmHg  AV Vmax: 1 48 m/s  PV Vmax: 0 86 m/s  PV maxP 98 mmHg  TR MaxP 76 mmHg  TR Vmax: 2 49 m/s    MM  TAPSE: 2 11 cm    PW  E' Sept: 0 05 m/s  E/E' Sept: 10 75  LVOT Vmax: 0 95 m/s  LVOT maxPG: 3 58 mmHg  MV A [place]: 0 64 m/s  MV Dec [place]: 2 24 m/s2  MV DecT: 264 1 ms  MV E [place]: 0 59 m/s  MV E/A Ratio: 0 92  MV PHT: 76 59 ms  MVA By PHT: 2 87 cm2  RVOT Vmax: 0 68 m/s  RVOT maxP 85 mmHg    [facility] Accredited Echocardiography Laboratory    Prepared and electronically signed by    Camilla Jimenez DO  Signed 13-Sep-2021 15:31:14    No results found for this or any previous visit  This note was completed in part utilizing [employer] direct voice recognition software  Grammatical errors, random word insertion, spelling mistakes, and incomplete sentences may be an occasional consequence of the system secondary to software limitations, ambient noise and hardware issues  At the time of dictation, efforts were made to edit, clarify and /or correct errors  Please read the chart carefully and recognize, using context, where substitutions have occurred    If you have any questions or concerns about the context, text or information contained within the body of this dictation, please contact myself, the provider, for further clarification

## 2021-09-20 ENCOUNTER — APPOINTMENT (OUTPATIENT)
Dept: LAB | Facility: CLINIC | Age: 66
End: 2021-09-20
Payer: MEDICARE

## 2021-09-20 ENCOUNTER — OFFICE VISIT (OUTPATIENT)
Dept: CARDIOLOGY CLINIC | Facility: CLINIC | Age: 66
End: 2021-09-20
Payer: MEDICARE

## 2021-09-20 VITALS
WEIGHT: 168.4 LBS | OXYGEN SATURATION: 99 % | SYSTOLIC BLOOD PRESSURE: 118 MMHG | BODY MASS INDEX: 30.99 KG/M2 | HEIGHT: 62 IN | DIASTOLIC BLOOD PRESSURE: 72 MMHG | HEART RATE: 63 BPM

## 2021-09-20 DIAGNOSIS — Z95.5 S/P DRUG ELUTING CORONARY STENT PLACEMENT: ICD-10-CM

## 2021-09-20 DIAGNOSIS — E78.2 MIXED HYPERLIPIDEMIA: ICD-10-CM

## 2021-09-20 DIAGNOSIS — R06.00 DYSPNEA, UNSPECIFIED TYPE: ICD-10-CM

## 2021-09-20 DIAGNOSIS — Z09 HOSPITAL DISCHARGE FOLLOW-UP: Primary | ICD-10-CM

## 2021-09-20 DIAGNOSIS — E66.09 CLASS 1 OBESITY DUE TO EXCESS CALORIES WITH BODY MASS INDEX (BMI) OF 30.0 TO 30.9 IN ADULT, UNSPECIFIED WHETHER SERIOUS COMORBIDITY PRESENT: ICD-10-CM

## 2021-09-20 DIAGNOSIS — I21.4 NSTEMI (NON-ST ELEVATED MYOCARDIAL INFARCTION) (HCC): ICD-10-CM

## 2021-09-20 LAB
ANION GAP SERPL CALCULATED.3IONS-SCNC: 6 MMOL/L (ref 4–13)
BUN SERPL-MCNC: 15 MG/DL (ref 5–25)
CALCIUM SERPL-MCNC: 9.3 MG/DL (ref 8.3–10.1)
CHLORIDE SERPL-SCNC: 108 MMOL/L (ref 100–108)
CO2 SERPL-SCNC: 28 MMOL/L (ref 21–32)
CREAT SERPL-MCNC: 0.98 MG/DL (ref 0.6–1.3)
ERYTHROCYTE [DISTWIDTH] IN BLOOD BY AUTOMATED COUNT: 16.4 % (ref 11.6–15.1)
GFR SERPL CREATININE-BSD FRML MDRD: 60 ML/MIN/1.73SQ M
GLUCOSE SERPL-MCNC: 85 MG/DL (ref 65–140)
HCT VFR BLD AUTO: 38.1 % (ref 34.8–46.1)
HGB BLD-MCNC: 11.3 G/DL (ref 11.5–15.4)
MCH RBC QN AUTO: 24.1 PG (ref 26.8–34.3)
MCHC RBC AUTO-ENTMCNC: 29.7 G/DL (ref 31.4–37.4)
MCV RBC AUTO: 81 FL (ref 82–98)
PLATELET # BLD AUTO: 321 THOUSANDS/UL (ref 149–390)
PMV BLD AUTO: 10.3 FL (ref 8.9–12.7)
POTASSIUM SERPL-SCNC: 4.3 MMOL/L (ref 3.5–5.3)
RBC # BLD AUTO: 4.69 MILLION/UL (ref 3.81–5.12)
SODIUM SERPL-SCNC: 142 MMOL/L (ref 136–145)
WBC # BLD AUTO: 7.54 THOUSAND/UL (ref 4.31–10.16)

## 2021-09-20 PROCEDURE — 85027 COMPLETE CBC AUTOMATED: CPT

## 2021-09-20 PROCEDURE — 36415 COLL VENOUS BLD VENIPUNCTURE: CPT

## 2021-09-20 PROCEDURE — 99215 OFFICE O/P EST HI 40 MIN: CPT | Performed by: NURSE PRACTITIONER

## 2021-09-20 PROCEDURE — 80048 BASIC METABOLIC PNL TOTAL CA: CPT

## 2021-09-20 RX ORDER — ATORVASTATIN CALCIUM 40 MG/1
40 TABLET, FILM COATED ORAL
Qty: 30 TABLET | Refills: 11 | Status: SHIPPED | OUTPATIENT
Start: 2021-09-20 | End: 2021-11-17

## 2021-09-20 NOTE — LETTER
September 20, 2021     Kiley Mckeon MD  [address]  [address] [place] [id number]    Patient: Agustin Odom   YOB: 1955   Date of Visit: 9/20/2021       Dear Dr Yates Friday: Thank you for referring Giorgio Robertson to me for evaluation  Below are my notes for this consultation  If you have questions, please do not hesitate to call me  I look forward to following your patient along with you  Sincerely,        DANIELLE Neff        CC: Maryan Lowery, DO Bj Robbins [address]  9/20/2021  3:35 PM  Sign when Signing Visit  Cardiology  MI Follow Up   Office Visit Note  Agustin Odom   78 y o    female   MRN: [id number]  [address]  [address] [place] [id number] [phone]    PCP: Kiley Mckeon MD  Cardiologist: Will be Dr Melva Leon            Summary of recommendations  Heart healthy diet  Educational information provided  Fasting lipid profile 8 weeks- ordered by her PCP  CBC, BMP  Cardiac rehab has been prescribed recommended  Medical adherence to dual antiplatelet therapy reinforced  Recommend Covid 19 vaccination  F/u with her PCP re: anemia and abnormal TSH  Follow up will be scheduled with Dr Melva Leon 6- 8 weeks        Assessment/plan  CAD,NSTEMI type 1 s/p THALIA to mid LAD, distal LCx , otherwise 50% prox RCA, 50% mid LCx lesion, medically managed   Trop to 16  Preserved LV fxn  Adm 9/12-9/14/21   On aspirin, Brilinta  a high intensity statin, beta-blocker     Cardiac rehab has been prescribed and recommended   Adherence to dual antiplatelet therapy reinforced  Hypertension, essential   BP  118/72 on metoprolol tartrate 25 mg Q 12   Avoid NSAIDs; avoid decongestants; Low-sodium diet; Home blood pressure monitoring periodically  Hyperlipidemia,   July 2021:    Non HDL  173  Started on atorvastatin 40 mg daily    Reassess lipids 8 weeks goal LDL less than 70  Obesity, BMI 31  History of endometrial cancer  Pre diabetes 9/12/21: HgA1C 6 4  Fe deficient anemia  Follow CBC; follow up with her PCP  Cardiac testing   TTE  9/13/21  EF 60%  No RWMA  Grade 1 DD  RV normal size and fxn  Mild MR  Mild TR   Cardiac catheterization 9/13/21  There was 2-vessel coronary artery disease  Mid LAD: There was a 90 % stenosis  This lesion is a likely culprit for the patient's recent myocardial infarction  Mid circumflex: There was a 50 % stenosis  Distal circumflex: There was a 90 % stenosis  Proximal RCA: There was a 50 % stenosis  Mid RCA: There was a 30 % stenosis  Intervention: PCI mid LAD and distal circumflex with THALIA                   HPI  Elizabeth Leal is a 79 yo female with, with dyslipidemia, obesity, and endometrial CA s/p hysterectomy  She is a lifelong nonsmoker  Adm 9/12-9/14/21  CC:  chest pain  She felt burning chest pain which she thought was reflux  She took Tums without help  The pain radiated to her arm  Due to persistent sx, she presented to the ED  She noted she was very active at baseline, however reports a 6 month history of exertional fatigue and dyspnea,walking long distances and going up inclines  Her troponin was elevated  Her BP was markedly elevated, 203/100 ; initial 12 lead ECG demonstrated NSR, incomplete RBBB, LVH w associated / ST-T wave repolarization abnormalities  She was managed with the ACS protocol with a heparin drip, was also given Brilinta in the ED  Left heart catheterization was performed September 13, demonstrating a 90% stenosis of the mid LAD and distal circumflex, for which she was successfully treated with drug-eluting stents  She had residual 50% proximal RCA disease, 50% mid circumflex disease, medically managed  An echocardiogram demonstrated preserved LV function no significant valve disease  She was started on DAPT with ASA and Brilinta  She was started on a high intensity statin for a baseline LDL of 132,  In July 2021   Ongoing RF modification was recommended  She had a mildly elevated TSH and anemia for which ongoing OP followup was recommended    9/20/21  Hospital follow-up  She is feeling well  She denies chest pain, shortness of breath, lightheadedness or dizziness  She does have mild ARAIZA  She is compliant with her medications  She followed up with her PCP  She has follow-up lipids in 8 weeks     We reviewed her coronary anatomy  I showed her a video of the stented coronary artery  She is compliant with dual antiplatelet therapy  Blood pressure is satisfactory    I recommended cardiac rehab  We discussed importance of heart healthy diet  She is agreeable to getting a CBC, nonfasting BMP today  I will call if this  Is abnormal     Her medications were refilled  She has a prescription for nitroglycerin  I reviewed with her how to use it  I have spent 40 minutes with Patient  today in which greater than 50% of this time was spent in counseling/coordination of care regarding Intructions for management, Patient and family education, Importance of tx compliance and Risk factor reductions  Assessment  Diagnoses and all orders for this visit:    Hospital discharge follow-up    [facility]ST [facility]  -     ticagrelor (BRILINTA) 90 MG; Take 1 tablet (90 mg total) by mouth every 12 (twelve) hours  -     metoprolol tartrate (LOPRESSOR) 25 mg tablet; Take 1 tablet (25 mg total) by mouth every 12 (twelve) hours  -     atorvastatin (LIPITOR) 40 mg tablet; Take 1 tablet (40 mg total) by mouth daily with dinner    S/P drug eluting coronary stent placement  -     CBC and Platelet; Future  -     Basic metabolic panel; Future    Mixed hyperlipidemia  -     Lipid panel; Future    Class 1 obesity due to excess calories with body mass index (BMI) of 30 0 to 30 9 in adult, unspecified whether serious comorbidity present    Dyspnea, unspecified type   -     CBC and Platelet;  Future          Past Medical History: Diagnosis Date    Chronic kidney disease     Elevated troponin 9/12/2021    Hypertensive urgency 9/12/2021       Review of Systems   Constitutional: Negative for chills  Cardiovascular: Positive for dyspnea on exertion  Negative for chest pain, claudication, cyanosis, irregular heartbeat, leg swelling, near-syncope, orthopnea, palpitations, paroxysmal nocturnal dyspnea and syncope  Respiratory: Negative for cough and shortness of breath  Gastrointestinal: Negative for heartburn and nausea  Neurological: Negative for dizziness, focal weakness, headaches, light-headedness and weakness  All other systems reviewed and are negative  No Known Allergies        Current Outpatient Medications:     aspirin 81 mg chewable tablet, Chew 1 tablet (81 mg total) daily, Disp: 30 tablet, Rfl: 0    atorvastatin (LIPITOR) 40 mg tablet, Take 1 tablet (40 mg total) by mouth daily with dinner, Disp: 30 tablet, Rfl: 11    ferrous sulfate 325 (65 Fe) mg tablet, Take 325 mg by mouth, Disp: , Rfl:     metoprolol tartrate (LOPRESSOR) 25 mg tablet, Take 1 tablet (25 mg total) by mouth every 12 (twelve) hours, Disp: 60 tablet, Rfl: 11    nitroglycerin (NITROSTAT) 0 4 mg SL tablet, Place 1 tablet (0 4 mg total) under the tongue every 5 (five) minutes as needed for chest pain (if pain free after sl ng then nittopaste), Disp: 15 tablet, Rfl: 0    ticagrelor (BRILINTA) 90 MG, Take 1 tablet (90 mg total) by mouth every 12 (twelve) hours, Disp: 60 tablet, Rfl: 11        Social History     Socioeconomic History    Marital status: /Civil Union     Spouse name: Not on file    Number of children: Not on file    Years of education: Not on file    Highest education level: Not on file   Occupational History    Not on file   Tobacco Use    Smoking status: Never Smoker    Smokeless tobacco: Never Used   Vaping Use    Vaping Use: Never used   Substance and Sexual Activity    Alcohol use: No    Drug use: No    Sexual activity: Not on file   Other Topics Concern    Not on file   Social History Narrative    Not on file     Social Determinants of Health     Financial Resource Strain:     Difficulty of Paying Living Expenses:    Food Insecurity:     Worried About Running Out of Food in the Last Year:     [address] in the Last Year:    Transportation Needs:     Lack of Transportation (Medical):  Lack of Transportation (Non-Medical):    Physical Activity:     Days of Exercise per Week:     Minutes of Exercise per Session:    Stress:     Feeling of Stress :    Social Connections:     Frequency of Communication with Friends and Family:     Frequency of Social Gatherings with Friends and Family:     Attends Denominational Services:     Active Member of Clubs or Organizations:     Attends Club or Organization Meetings:     Marital Status:    Intimate Partner Violence:     Fear of Current or Ex-Partner:     Emotionally Abused:     Physically Abused:     Sexually Abused:        Family History   Problem Relation Age of Onset    No Known Problems Mother     No Known Problems Father     No Known Problems Sister     No Known Problems Maternal Grandmother     No Known Problems Maternal Grandfather     No Known Problems Paternal Grandmother     No Known Problems Paternal Grandfather     No Known Problems Maternal Aunt     No Known Problems Maternal Aunt     No Known Problems Maternal Aunt     No Known Problems Maternal Aunt     No Known Problems Paternal Aunt        Physical Exam  Vitals and nursing note reviewed  Constitutional:       General: She is not in acute distress  Appearance: She is not diaphoretic  HENT:      Head: Normocephalic and atraumatic  Eyes:      Conjunctiva/sclera: Conjunctivae normal    Cardiovascular:      Rate and Rhythm: Normal rate and regular rhythm  Pulses: Intact distal pulses  Heart sounds: Normal heart sounds     Pulmonary:      Effort: Pulmonary effort is normal       Breath sounds: Normal breath sounds  Abdominal:      General: Bowel sounds are normal       Palpations: Abdomen is soft  Musculoskeletal:         General: Normal range of motion  Cervical back: Normal range of motion and neck supple  Skin:     General: Skin is warm and dry  Neurological:      Mental Status: She is alert and oriented to person, place, and time  Vitals: Blood pressure 118/72, pulse 63, height 5' 2" (1 575 m), weight 76 4 kg (168 lb 6 4 oz), SpO2 99 %  Wt Readings from Last 3 Encounters:   09/20/21 76 4 kg (168 lb 6 4 oz)   09/12/21 78 5 kg (173 lb)   08/25/21 77 6 kg (171 lb)         Labs & Results:  Lab Results   Component Value Date    WBC 8 59 09/14/2021    HGB 10 0 (L) 09/14/2021    HCT 33 2 (L) 09/14/2021    MCV 80 (L) 09/14/2021     09/14/2021     No results found for: BNP  No components found for: CHEM  Troponin I   Date Value Ref Range Status   09/13/2021 11 01 (H) <=0 04 ng/mL Final     Comment:     [employer] Chemistry analyzer 99% cutoff is > 0 04 ng/mL in network labs     o cTnI 99% cutoff is useful only when applied to patients in the clinical setting of myocardial ischemia   o cTnI 99% cutoff should be interpreted in the context of clinical history, ECG findings and possibly cardiac imaging to establish correct diagnosis  o cTnI 99% cutoff may be suggestive but clearly not indicative of a coronary event without the clinical setting of myocardial ischemia      09/13/2021 13 78 (H) <=0 04 ng/mL Final     Comment:     [employer] Chemistry analyzer 99% cutoff is > 0 04 ng/mL in network labs     o cTnI 99% cutoff is useful only when applied to patients in the clinical setting of myocardial ischemia   o cTnI 99% cutoff should be interpreted in the context of clinical history, ECG findings and possibly cardiac imaging to establish correct diagnosis     o cTnI 99% cutoff may be suggestive but clearly not indicative of a coronary event without the clinical setting of myocardial ischemia      2021 16 01 (H) <=0 04 ng/mL Final     Comment:     S[employer]ens Chemistry analyzer 99% cutoff is > 0 04 ng/mL in network labs     o cTnI 99% cutoff is useful only when applied to patients in the clinical setting of myocardial ischemia   o cTnI 99% cutoff should be interpreted in the context of clinical history, ECG findings and possibly cardiac imaging to establish correct diagnosis  o cTnI 99% cutoff may be suggestive but clearly not indicative of a coronary event without the clinical setting of myocardial ischemia  Results for orders placed during the hospital encounter of 21    Echo complete with contrast if indicated    Narrative  [facility], [facility]  [phone]    Transthoracic Echocardiogram  2D, M-mode, Doppler, and Color Doppler    Study date:  13-Sep-2021    Patient: Ritika Parikh  MR number: [id number]  Account number: [de-identified]  : 1955  Age: 78 years  Gender: Female  Status: Inpatient  Location: Bedside  Height: 63 in  Weight: 172 7 lb  BP: 111/ 55 mmHg    Indications: Acute MI    Diagnoses: I21 4 - Non-ST elevation (NSTEMI) myocardial infarction    Sonographer:  [facility]  Primary Physician:  Adarsh Travis MD  Referring Physician:  DANIELLE Johnson  Group:  [facility]  Interpreting Physician:  Ellis Liu DO    SUMMARY    LEFT VENTRICLE:  Systolic function was normal  Ejection fraction was estimated to be 60 %  There were no regional wall motion abnormalities  Wall thickness was mildly increased  Concentric hypertrophy was present  Doppler parameters were consistent with abnormal left ventricular relaxation (grade 1 diastolic dysfunction)  RIGHT VENTRICLE:  The size was normal   Systolic function was normal     MITRAL VALVE:  There was mild regurgitation  TRICUSPID VALVE:  There was mild regurgitation      HISTORY: PRIOR HISTORY: HTN, CKD    PROCEDURE: The procedure was performed at the bedside  This was a routine study  The transthoracic approach was used  The study included complete 2D imaging, M-mode, complete spectral Doppler, and color Doppler  The heart rate was 57 bpm,  at the start of the study  Images were obtained from the parasternal, apical, subcostal, and suprasternal notch acoustic windows  Image quality was adequate  LEFT VENTRICLE: Size was normal  Systolic function was normal  Ejection fraction was estimated to be 60 %  There were no regional wall motion abnormalities  Wall thickness was mildly increased  Concentric hypertrophy was present  DOPPLER:  Doppler parameters were consistent with abnormal left ventricular relaxation (grade 1 diastolic dysfunction)  There was no evidence of elevated ventricular filling pressure by Doppler parameters  RIGHT VENTRICLE: The size was normal  Systolic function was normal  Wall thickness was normal     LEFT ATRIUM: Size was normal     RIGHT ATRIUM: Size was normal     MITRAL VALVE: Valve structure was normal  There was normal leaflet separation  DOPPLER: The transmitral velocity was within the normal range  There was no evidence for stenosis  There was mild regurgitation  AORTIC VALVE: The valve was trileaflet  Leaflets exhibited normal thickness and normal cuspal separation  DOPPLER: Transaortic velocity was within the normal range  There was no evidence for stenosis  There was no regurgitation  TRICUSPID VALVE: The valve structure was normal  There was normal leaflet separation  DOPPLER: The transtricuspid velocity was within the normal range  There was no evidence for stenosis  There was mild regurgitation  Pulmonary artery  systolic pressure was within the normal range  PULMONIC VALVE: Leaflets exhibited normal thickness, no calcification, and normal cuspal separation  DOPPLER: The transpulmonic velocity was within the normal range   There was no regurgitation  PERICARDIUM: There was no pericardial effusion  A pericardial fat pad was present  The pericardium was normal in appearance  AORTA: The root exhibited normal size  SYSTEMIC VEINS: IVC: The inferior vena cava was normal in size and course  Respirophasic changes were normal     SYSTEM MEASUREMENT TABLES    2D  %FS: 38 67 %  Ao Diam: 3 cm  Ao asc: 3 26 cm  EDV(Teich): 106 33 ml  EF(Teich): 68 93 %  ESV(Teich): 33 04 ml  IVSd: 1 15 cm  LA Area: 18 56 cm2  LA Diam: 4 15 cm  LVEDV MOD A4C: 63 85 ml  LVEF MOD A4C: 58 87 %  LVESV MOD A4C: 26 26 ml  LVIDd: 4 78 cm  LVIDs: 2 93 cm  LVLd A4C: 7 63 cm  LVLs A4C: 7 02 cm  LVPWd: 1 14 cm  RA Area: 15 37 cm2  RVIDd: 4 08 cm  SV MOD A4C: 37 59 ml  SV(Teich): 73 3 ml    CW  AV MaxP 75 mmHg  AV Vmax: 1 48 m/s  PV Vmax: 0 86 m/s  PV maxP 98 mmHg  TR MaxP 76 mmHg  TR Vmax: 2 49 m/s    MM  TAPSE: 2 11 cm    PW  E' Sept: 0 05 m/s  E/E' Sept: 10 75  LVOT Vmax: 0 95 m/s  LVOT maxPG: 3 58 mmHg  MV A Elgin: 0 64 m/s  MV Dec Otoe: 2 24 m/s2  MV DecT: 264 1 ms  MV E [place]gin: 0 59 m/s  MV E/A Ratio: 0 92  MV PHT: 76 59 ms  MVA By PHT: 2 87 cm2  RVOT Vmax: 0 68 m/s  RVOT maxP 85 mmHg    [facility] Commission Accredited Echocardiography Laboratory    Prepared and electronically signed by    Naren Higgins DO  Signed 13-Sep-2021 15:31:14    No results found for this or any previous visit  This note was completed in part utilizing [email or web address] fluency direct voice recognition software  Grammatical errors, random word insertion, spelling mistakes, and incomplete sentences may be an occasional consequence of the system secondary to software limitations, ambient noise and hardware issues  At the time of dictation, efforts were made to edit, clarify and /or correct errors  Please read the chart carefully and recognize, using context, where substitutions have occurred    If you have any questions or concerns about the context, text or information contained within the body of this dictation, please contact myself, the provider, for further clarification

## 2021-09-20 NOTE — PATIENT INSTRUCTIONS
DASH Eating Plan   WHAT YOU NEED TO KNOW:   The DASH (Dietary Approaches to Stop Hypertension) Eating Plan is designed to help prevent or lower high blood pressure  It can also help to lower LDL (bad) cholesterol and decrease your risk of heart disease  The plan is low in sodium, sugar, unhealthy fats, and total fat  It is high in potassium, calcium, magnesium, and fiber  These nutrients are added when you eat more fruits, vegetables, and whole grains  DISCHARGE INSTRUCTIONS:   Your sodium limit each day: Your dietitian will tell you how much sodium is safe for you to have each day  People with high blood pressure should have no more than 1,500 to 2,300 mg of sodium in a day  A teaspoon (tsp) of salt has 2,300 mg of sodium  This may seem like a difficult goal, but small changes to the foods you eat can make a big difference  Your healthcare provider or dietitian can help you create a meal plan that follows your sodium limit  How to limit sodium:   · Read food labels  Food labels can help you choose foods that are low in sodium  The amount of sodium is listed in milligrams (mg)  The % Daily Value (DV) column tells you how much of your daily needs are met by 1 serving of the food for each nutrient listed  Choose foods that have less than 5% of the DV of sodium  These foods are considered low in sodium  Foods that have 20% or more of the DV of sodium are considered high in sodium  Avoid foods that have more than 300 mg of sodium in each serving  Choose foods that say low-sodium, reduced-sodium, or no salt added on the food label  · Avoid salt  Do not salt food at the table, and add very little salt to foods during cooking  Use herbs and spices, such as onions, garlic, and salt-free seasonings to add flavor to foods  Try lemon or lime juice or vinegar to give foods a tart flavor  Use hot peppers or a small amount of hot pepper sauce to add a spicy flavor to foods  · Ask about salt substitutes    Ask your healthcare provider if you may use salt substitutes  Some salt substitutes have ingredients that can be harmful if you have certain health conditions  · Choose foods carefully at restaurants  Meals from restaurants, especially fast food restaurants, are often high in sodium  Some restaurants have nutrition information that tells you the amount of sodium in their foods  Ask to have your food prepared with less, or no salt  What you need to know about fats:   · Include healthy fats  Examples are unsaturated fats and omega-3 fatty acids  Unsaturated fats are found in soybean, canola, olive, or sunflower oil, and liquid and soft tub margarines  Omega-3 fatty acids are found in fatty fish, such as salmon, tuna, mackerel, and sardines  It is also found in flaxseed oil and ground flaxseed  · Avoid unhealthy fats  Do not eat unhealthy fats, such as saturated fats and trans fats  Saturated fats are found in foods that contain fat from animals  Examples are fatty meats, whole milk, butter, cream, and other dairy foods  It is also found in shortening, stick margarine, palm oil, and coconut oil  Trans fats are found in fried foods, crackers, chips, and baked goods made with margarine or shortening  Foods to include: With the DASH eating plan, you need to eat a certain number of servings from each food group  This will help you get enough of certain nutrients and limit others  The amount of servings you should eat depends on how many calories you need  Your dietitian can tell you how many calories you need  The number of servings listed next to the food groups below are for people who need about 2,000 calories each day  · Grains:  6 to 8 servings (3 of these servings should be whole-grain foods)    ? 1 slice of whole-grain bread    ? 1 ounce of dry cereal    ? ½ cup of cooked cereal, pasta, or brown rice    · Vegetables and fruits:  4 to 5 servings of fruits and 4 to 5 servings of vegetables    ?  1 medium fruit    ? ½ cup of frozen, canned (no added salt), or chopped fresh vegetables    ? ½ cup of fresh, frozen, dried, or canned fruit (canned in light syrup or fruit juice)    ? ½ cup of vegetable or fruit juice    · Dairy:  2 to 3 servings    ? 1 cup of nonfat (skim) or 1% milk    ? 1½ ounces of fat-free or low-fat cheese    ? 6 ounces of nonfat or low-fat yogurt    · Lean meat, poultry, and fish:  6 ounces or less    ? Poultry (chicken, turkey) with no skin    ? Fish (especially fatty fish, such as salmon, fresh tuna, or mackerel)    ? Lean beef and pork (loin, round, extra lean hamburger)    ? Egg whites and egg substitutes    · Nuts, seeds, and legumes:  4 to 5 servings each week    ? ½ cup of cooked beans and peas    ? 1½ ounces of unsalted nuts    ? 2 tablespoons of peanut butter or seeds    · Sweets and added sugars:  5 or less each week    ? 1 tablespoon of sugar, jelly, or jam    ? ½ cup of sorbet or gelatin    ? 1 cup of lemonade    · Fats:  2 to 3 servings each week    ? 1 teaspoon of soft margarine or vegetable oil    ? 1 tablespoon of mayonnaise    ? 2 tablespoons of salad dressing    Foods to avoid:   · Grains:      ? Baked goods, such as doughnuts, pastries, cookies, and biscuits (high in fat and sugar)    ? Mixes for cornbread and biscuits, packaged foods, such as bread stuffing, rice and pasta mixes, macaroni and cheese, and instant cereals (high in sodium)    · Fruits and vegetables:      ? Regular, canned vegetables (high in sodium)    ? Sauerkraut, pickled vegetables, and other foods prepared in brine (high in sodium)    ? Fried vegetables or vegetables in butter or high-fat sauces    ? Fruit in cream or butter sauce (high in fat)    · Dairy:      ? Whole milk, 2% milk, and cream (high in fat)    ?  Regular cheese and processed cheese (high in fat and sodium)    · Meats and protein foods:      ? Smoked or cured meat, such as corned beef, mcarthur, ham, hot dogs, and sausage (high in fat and sodium)    ? Canned beans and canned meats or spreads, such as potted meats, sardines, anchovies, and imitation seafood (high in sodium)    ? Deli or lunch meats, such as bologna, ham, turkey, and roast beef (high in sodium)    ? High-fat meat (T-bone steak, regular hamburger, and ribs)    ? Whole eggs and egg yolks (high in fat)    · Other:      ? Seasonings made with salt, such as garlic salt, celery salt, onion salt, seasoned salt, meat tenderizers, and monosodium glutamate (MSG)    ? Miso soup and canned or dried soup mixes (high in sodium)    ? Regular soy sauce, barbecue sauce, teriyaki sauce, steak sauce, Worcestershire sauce, and most flavored vinegars (high in sodium)    ? Regular condiments, such as mustard, ketchup, and salad dressings (high in sodium)    ? Gravy and sauces, such as Jose or cheese sauces (high in sodium and fat)    ? Drinks high in sugar, such as soda or fruit drinks    ? Snack foods, such as salted chips, popcorn, pretzels, pork rinds, salted crackers, and salted nuts    ? Frozen foods, such as dinners, entrees, vegetables with sauces, and breaded meats (high in sodium)    Other guidelines to follow:   · Maintain a healthy weight  Your risk for heart disease is higher if you are overweight  Your healthcare provider may suggest that you lose weight if you are overweight  You can lose weight by eating fewer calories and foods that have added sugars and fat  The DASH meal plan can help you do this  Decrease calories by eating smaller portions at each meal and fewer snacks  Ask your healthcare provider for more information about how to lose weight  · Exercise regularly  Regular exercise can help you reach or maintain a healthy weight  Regular exercise can also help decrease your blood pressure and improve your cholesterol levels  Get 30 minutes or more of moderate exercise each day of the week  To lose weight, get at least 60 minutes of exercise   Talk to your healthcare provider about the best exercise program for you  · Limit alcohol  Women should limit alcohol to 1 drink a day  Men should limit alcohol to 2 drinks a day  A drink of alcohol is 12 ounces of beer, 5 ounces of wine, or 1½ ounces of liquor  © Copyright [employer] 2021 Information is for End User's use only and may not be sold, redistributed or otherwise used for commercial purposes  All illustrations and images included in CareNotes® are the copyrighted property of [employer]  or [facility]   The above information is an  only  It is not intended as medical advice for individual conditions or treatments  Talk to your doctor, nurse or pharmacist before following any medical regimen to see if it is safe and effective for you

## 2021-10-12 ENCOUNTER — TELEPHONE (OUTPATIENT)
Dept: CARDIOLOGY CLINIC | Facility: CLINIC | Age: 66
End: 2021-10-12

## 2021-11-01 ENCOUNTER — CLINICAL SUPPORT (OUTPATIENT)
Dept: CARDIAC REHAB | Facility: CLINIC | Age: 66
End: 2021-11-01
Payer: MEDICARE

## 2021-11-01 DIAGNOSIS — Z95.5 STATUS POST INSERTION OF DRUG ELUTING CORONARY ARTERY STENT: ICD-10-CM

## 2021-11-01 DIAGNOSIS — I21.4 NSTEMI (NON-ST ELEVATED MYOCARDIAL INFARCTION) (HCC): ICD-10-CM

## 2021-11-01 PROCEDURE — 93797 PHYS/QHP OP CAR RHAB WO ECG: CPT

## 2021-11-04 ENCOUNTER — CLINICAL SUPPORT (OUTPATIENT)
Dept: CARDIAC REHAB | Facility: CLINIC | Age: 66
End: 2021-11-04
Payer: MEDICARE

## 2021-11-04 DIAGNOSIS — I21.4 NSTEMI (NON-ST ELEVATED MYOCARDIAL INFARCTION) (HCC): ICD-10-CM

## 2021-11-04 PROCEDURE — 93798 PHYS/QHP OP CAR RHAB W/ECG: CPT

## 2021-11-05 ENCOUNTER — CLINICAL SUPPORT (OUTPATIENT)
Dept: CARDIAC REHAB | Facility: CLINIC | Age: 66
End: 2021-11-05
Payer: MEDICARE

## 2021-11-05 DIAGNOSIS — I21.4 NSTEMI (NON-ST ELEVATED MYOCARDIAL INFARCTION) (HCC): ICD-10-CM

## 2021-11-05 PROCEDURE — 93798 PHYS/QHP OP CAR RHAB W/ECG: CPT

## 2021-11-09 ENCOUNTER — CLINICAL SUPPORT (OUTPATIENT)
Dept: CARDIAC REHAB | Facility: CLINIC | Age: 66
End: 2021-11-09
Payer: MEDICARE

## 2021-11-09 DIAGNOSIS — I21.4 NSTEMI (NON-ST ELEVATED MYOCARDIAL INFARCTION) (HCC): ICD-10-CM

## 2021-11-09 PROCEDURE — 93798 PHYS/QHP OP CAR RHAB W/ECG: CPT

## 2021-11-11 ENCOUNTER — CLINICAL SUPPORT (OUTPATIENT)
Dept: CARDIAC REHAB | Facility: CLINIC | Age: 66
End: 2021-11-11
Payer: MEDICARE

## 2021-11-11 DIAGNOSIS — I21.4 NSTEMI (NON-ST ELEVATED MYOCARDIAL INFARCTION) (HCC): ICD-10-CM

## 2021-11-11 PROCEDURE — 93798 PHYS/QHP OP CAR RHAB W/ECG: CPT

## 2021-11-12 ENCOUNTER — CLINICAL SUPPORT (OUTPATIENT)
Dept: CARDIAC REHAB | Facility: CLINIC | Age: 66
End: 2021-11-12
Payer: MEDICARE

## 2021-11-12 DIAGNOSIS — I21.4 NSTEMI (NON-ST ELEVATED MYOCARDIAL INFARCTION) (HCC): ICD-10-CM

## 2021-11-12 PROCEDURE — 93798 PHYS/QHP OP CAR RHAB W/ECG: CPT

## 2021-11-16 ENCOUNTER — CLINICAL SUPPORT (OUTPATIENT)
Dept: CARDIAC REHAB | Facility: CLINIC | Age: 66
End: 2021-11-16
Payer: MEDICARE

## 2021-11-16 DIAGNOSIS — I21.4 NSTEMI (NON-ST ELEVATED MYOCARDIAL INFARCTION) (HCC): ICD-10-CM

## 2021-11-16 PROBLEM — Z95.5 PRESENCE OF DRUG-ELUTING STENT IN LEFT CIRCUMFLEX CORONARY ARTERY: Status: ACTIVE | Noted: 2021-11-16

## 2021-11-16 PROBLEM — E78.5 DYSLIPIDEMIA: Status: ACTIVE | Noted: 2021-11-16

## 2021-11-16 PROBLEM — I25.10 CORONARY ARTERY DISEASE INVOLVING NATIVE CORONARY ARTERY OF NATIVE HEART WITHOUT ANGINA PECTORIS: Status: ACTIVE | Noted: 2021-09-12

## 2021-11-16 PROCEDURE — 93798 PHYS/QHP OP CAR RHAB W/ECG: CPT

## 2021-11-17 ENCOUNTER — OFFICE VISIT (OUTPATIENT)
Dept: CARDIOLOGY CLINIC | Facility: CLINIC | Age: 66
End: 2021-11-17
Payer: MEDICARE

## 2021-11-17 VITALS
BODY MASS INDEX: 30.82 KG/M2 | HEART RATE: 57 BPM | OXYGEN SATURATION: 97 % | HEIGHT: 62 IN | WEIGHT: 167.5 LBS | DIASTOLIC BLOOD PRESSURE: 82 MMHG | RESPIRATION RATE: 18 BRPM | SYSTOLIC BLOOD PRESSURE: 122 MMHG

## 2021-11-17 DIAGNOSIS — I25.10 CORONARY ARTERY DISEASE INVOLVING NATIVE CORONARY ARTERY OF NATIVE HEART WITHOUT ANGINA PECTORIS: Primary | ICD-10-CM

## 2021-11-17 DIAGNOSIS — Z95.5 PRESENCE OF DRUG COATED STENT IN LAD CORONARY ARTERY: ICD-10-CM

## 2021-11-17 DIAGNOSIS — Z95.5 PRESENCE OF DRUG-ELUTING STENT IN LEFT CIRCUMFLEX CORONARY ARTERY: ICD-10-CM

## 2021-11-17 DIAGNOSIS — E78.5 DYSLIPIDEMIA: ICD-10-CM

## 2021-11-17 DIAGNOSIS — E66.09 CLASS 1 OBESITY DUE TO EXCESS CALORIES WITH SERIOUS COMORBIDITY AND BODY MASS INDEX (BMI) OF 30.0 TO 30.9 IN ADULT: ICD-10-CM

## 2021-11-17 PROCEDURE — 99214 OFFICE O/P EST MOD 30 MIN: CPT | Performed by: INTERNAL MEDICINE

## 2021-11-17 RX ORDER — ROSUVASTATIN CALCIUM 40 MG/1
40 TABLET, COATED ORAL DAILY
Qty: 90 TABLET | Refills: 3 | Status: SHIPPED | OUTPATIENT
Start: 2021-11-17

## 2021-11-19 ENCOUNTER — CLINICAL SUPPORT (OUTPATIENT)
Dept: CARDIAC REHAB | Facility: CLINIC | Age: 66
End: 2021-11-19
Payer: MEDICARE

## 2021-11-19 DIAGNOSIS — I21.4 NSTEMI (NON-ST ELEVATED MYOCARDIAL INFARCTION) (HCC): ICD-10-CM

## 2021-11-19 PROCEDURE — 93798 PHYS/QHP OP CAR RHAB W/ECG: CPT

## 2021-11-23 ENCOUNTER — CLINICAL SUPPORT (OUTPATIENT)
Dept: CARDIAC REHAB | Facility: CLINIC | Age: 66
End: 2021-11-23
Payer: MEDICARE

## 2021-11-23 DIAGNOSIS — I21.4 NSTEMI (NON-ST ELEVATED MYOCARDIAL INFARCTION) (HCC): ICD-10-CM

## 2021-11-23 PROCEDURE — 93798 PHYS/QHP OP CAR RHAB W/ECG: CPT

## 2021-11-26 ENCOUNTER — CLINICAL SUPPORT (OUTPATIENT)
Dept: CARDIAC REHAB | Facility: CLINIC | Age: 66
End: 2021-11-26
Payer: MEDICARE

## 2021-11-26 DIAGNOSIS — I21.4 NSTEMI (NON-ST ELEVATED MYOCARDIAL INFARCTION) (HCC): ICD-10-CM

## 2021-11-26 PROCEDURE — 93798 PHYS/QHP OP CAR RHAB W/ECG: CPT

## 2021-11-30 ENCOUNTER — CLINICAL SUPPORT (OUTPATIENT)
Dept: CARDIAC REHAB | Facility: CLINIC | Age: 66
End: 2021-11-30
Payer: MEDICARE

## 2021-11-30 DIAGNOSIS — I21.4 NSTEMI (NON-ST ELEVATED MYOCARDIAL INFARCTION) (HCC): ICD-10-CM

## 2021-11-30 PROCEDURE — 93798 PHYS/QHP OP CAR RHAB W/ECG: CPT

## 2021-12-02 ENCOUNTER — CLINICAL SUPPORT (OUTPATIENT)
Dept: CARDIAC REHAB | Facility: CLINIC | Age: 66
End: 2021-12-02
Payer: MEDICARE

## 2021-12-02 DIAGNOSIS — I21.4 NSTEMI (NON-ST ELEVATED MYOCARDIAL INFARCTION) (HCC): ICD-10-CM

## 2021-12-02 PROCEDURE — 93798 PHYS/QHP OP CAR RHAB W/ECG: CPT

## 2021-12-03 ENCOUNTER — CLINICAL SUPPORT (OUTPATIENT)
Dept: CARDIAC REHAB | Facility: CLINIC | Age: 66
End: 2021-12-03
Payer: MEDICARE

## 2021-12-03 DIAGNOSIS — I21.4 NSTEMI (NON-ST ELEVATED MYOCARDIAL INFARCTION) (HCC): ICD-10-CM

## 2021-12-03 PROCEDURE — 93798 PHYS/QHP OP CAR RHAB W/ECG: CPT

## 2021-12-07 ENCOUNTER — CLINICAL SUPPORT (OUTPATIENT)
Dept: CARDIAC REHAB | Facility: CLINIC | Age: 66
End: 2021-12-07
Payer: MEDICARE

## 2021-12-07 DIAGNOSIS — I21.4 NSTEMI (NON-ST ELEVATED MYOCARDIAL INFARCTION) (HCC): ICD-10-CM

## 2021-12-07 PROCEDURE — 93798 PHYS/QHP OP CAR RHAB W/ECG: CPT

## 2021-12-09 ENCOUNTER — CLINICAL SUPPORT (OUTPATIENT)
Dept: CARDIAC REHAB | Facility: CLINIC | Age: 66
End: 2021-12-09
Payer: MEDICARE

## 2021-12-09 DIAGNOSIS — I21.4 NSTEMI (NON-ST ELEVATED MYOCARDIAL INFARCTION) (HCC): Primary | ICD-10-CM

## 2021-12-09 PROCEDURE — 93798 PHYS/QHP OP CAR RHAB W/ECG: CPT

## 2021-12-10 ENCOUNTER — APPOINTMENT (OUTPATIENT)
Dept: CARDIAC REHAB | Facility: CLINIC | Age: 66
End: 2021-12-10
Payer: MEDICARE

## 2021-12-14 ENCOUNTER — CLINICAL SUPPORT (OUTPATIENT)
Dept: CARDIAC REHAB | Facility: CLINIC | Age: 66
End: 2021-12-14
Payer: MEDICARE

## 2021-12-14 DIAGNOSIS — I21.4 NSTEMI (NON-ST ELEVATED MYOCARDIAL INFARCTION) (HCC): Primary | ICD-10-CM

## 2021-12-14 PROCEDURE — 93798 PHYS/QHP OP CAR RHAB W/ECG: CPT

## 2021-12-16 ENCOUNTER — CLINICAL SUPPORT (OUTPATIENT)
Dept: CARDIAC REHAB | Facility: CLINIC | Age: 66
End: 2021-12-16
Payer: MEDICARE

## 2021-12-16 DIAGNOSIS — I21.4 NSTEMI (NON-ST ELEVATED MYOCARDIAL INFARCTION) (HCC): Primary | ICD-10-CM

## 2021-12-16 PROCEDURE — 93798 PHYS/QHP OP CAR RHAB W/ECG: CPT

## 2021-12-17 ENCOUNTER — CLINICAL SUPPORT (OUTPATIENT)
Dept: CARDIAC REHAB | Facility: CLINIC | Age: 66
End: 2021-12-17
Payer: MEDICARE

## 2021-12-17 DIAGNOSIS — I21.4 NSTEMI (NON-ST ELEVATED MYOCARDIAL INFARCTION) (HCC): Primary | ICD-10-CM

## 2021-12-17 PROCEDURE — 93798 PHYS/QHP OP CAR RHAB W/ECG: CPT

## 2021-12-21 ENCOUNTER — CLINICAL SUPPORT (OUTPATIENT)
Dept: CARDIAC REHAB | Facility: CLINIC | Age: 66
End: 2021-12-21
Payer: MEDICARE

## 2021-12-21 DIAGNOSIS — I21.4 NSTEMI (NON-ST ELEVATED MYOCARDIAL INFARCTION) (HCC): Primary | ICD-10-CM

## 2021-12-21 PROCEDURE — 93798 PHYS/QHP OP CAR RHAB W/ECG: CPT

## 2021-12-23 ENCOUNTER — CLINICAL SUPPORT (OUTPATIENT)
Dept: CARDIAC REHAB | Facility: CLINIC | Age: 66
End: 2021-12-23
Payer: MEDICARE

## 2021-12-23 DIAGNOSIS — I21.4 NSTEMI (NON-ST ELEVATED MYOCARDIAL INFARCTION) (HCC): Primary | ICD-10-CM

## 2021-12-23 PROCEDURE — 93798 PHYS/QHP OP CAR RHAB W/ECG: CPT

## 2021-12-28 ENCOUNTER — CLINICAL SUPPORT (OUTPATIENT)
Dept: CARDIAC REHAB | Facility: CLINIC | Age: 66
End: 2021-12-28
Payer: MEDICARE

## 2021-12-28 DIAGNOSIS — I21.4 NSTEMI (NON-ST ELEVATED MYOCARDIAL INFARCTION) (HCC): Primary | ICD-10-CM

## 2021-12-28 PROCEDURE — 93798 PHYS/QHP OP CAR RHAB W/ECG: CPT

## 2021-12-30 ENCOUNTER — CLINICAL SUPPORT (OUTPATIENT)
Dept: CARDIAC REHAB | Facility: CLINIC | Age: 66
End: 2021-12-30
Payer: MEDICARE

## 2021-12-30 DIAGNOSIS — I21.4 NSTEMI (NON-ST ELEVATED MYOCARDIAL INFARCTION) (HCC): Primary | ICD-10-CM

## 2021-12-30 PROCEDURE — 93798 PHYS/QHP OP CAR RHAB W/ECG: CPT

## 2021-12-31 ENCOUNTER — CLINICAL SUPPORT (OUTPATIENT)
Dept: CARDIAC REHAB | Facility: CLINIC | Age: 66
End: 2021-12-31
Payer: MEDICARE

## 2021-12-31 DIAGNOSIS — I21.4 NSTEMI (NON-ST ELEVATED MYOCARDIAL INFARCTION) (HCC): Primary | ICD-10-CM

## 2021-12-31 PROCEDURE — 93798 PHYS/QHP OP CAR RHAB W/ECG: CPT

## 2022-01-04 ENCOUNTER — CLINICAL SUPPORT (OUTPATIENT)
Dept: CARDIAC REHAB | Facility: CLINIC | Age: 67
End: 2022-01-04
Payer: MEDICARE

## 2022-01-04 DIAGNOSIS — I21.4 NSTEMI (NON-ST ELEVATED MYOCARDIAL INFARCTION) (HCC): Primary | ICD-10-CM

## 2022-01-04 PROCEDURE — 93798 PHYS/QHP OP CAR RHAB W/ECG: CPT

## 2022-01-06 ENCOUNTER — CLINICAL SUPPORT (OUTPATIENT)
Dept: CARDIAC REHAB | Facility: CLINIC | Age: 67
End: 2022-01-06
Payer: MEDICARE

## 2022-01-06 DIAGNOSIS — I21.4 NSTEMI (NON-ST ELEVATED MYOCARDIAL INFARCTION) (HCC): Primary | ICD-10-CM

## 2022-01-06 PROCEDURE — 93798 PHYS/QHP OP CAR RHAB W/ECG: CPT

## 2022-01-07 ENCOUNTER — APPOINTMENT (OUTPATIENT)
Dept: CARDIAC REHAB | Facility: CLINIC | Age: 67
End: 2022-01-07
Payer: MEDICARE

## 2022-01-11 ENCOUNTER — CLINICAL SUPPORT (OUTPATIENT)
Dept: CARDIAC REHAB | Facility: CLINIC | Age: 67
End: 2022-01-11
Payer: MEDICARE

## 2022-01-11 DIAGNOSIS — I21.4 NSTEMI (NON-ST ELEVATED MYOCARDIAL INFARCTION) (HCC): Primary | ICD-10-CM

## 2022-01-11 PROCEDURE — 93798 PHYS/QHP OP CAR RHAB W/ECG: CPT

## 2022-01-13 ENCOUNTER — CLINICAL SUPPORT (OUTPATIENT)
Dept: CARDIAC REHAB | Facility: CLINIC | Age: 67
End: 2022-01-13
Payer: MEDICARE

## 2022-01-13 DIAGNOSIS — I21.4 NSTEMI (NON-ST ELEVATED MYOCARDIAL INFARCTION) (HCC): Primary | ICD-10-CM

## 2022-01-13 PROCEDURE — 93798 PHYS/QHP OP CAR RHAB W/ECG: CPT

## 2022-01-14 ENCOUNTER — CLINICAL SUPPORT (OUTPATIENT)
Dept: CARDIAC REHAB | Facility: CLINIC | Age: 67
End: 2022-01-14
Payer: MEDICARE

## 2022-01-14 DIAGNOSIS — I21.4 NSTEMI (NON-ST ELEVATED MYOCARDIAL INFARCTION) (HCC): Primary | ICD-10-CM

## 2022-01-14 PROCEDURE — 93798 PHYS/QHP OP CAR RHAB W/ECG: CPT

## 2022-01-18 ENCOUNTER — CLINICAL SUPPORT (OUTPATIENT)
Dept: CARDIAC REHAB | Facility: CLINIC | Age: 67
End: 2022-01-18
Payer: MEDICARE

## 2022-01-18 DIAGNOSIS — I21.4 NSTEMI (NON-ST ELEVATED MYOCARDIAL INFARCTION) (HCC): Primary | ICD-10-CM

## 2022-01-18 PROCEDURE — 93798 PHYS/QHP OP CAR RHAB W/ECG: CPT

## 2022-01-20 ENCOUNTER — APPOINTMENT (OUTPATIENT)
Dept: CARDIAC REHAB | Facility: CLINIC | Age: 67
End: 2022-01-20
Payer: MEDICARE

## 2022-01-21 ENCOUNTER — CLINICAL SUPPORT (OUTPATIENT)
Dept: CARDIAC REHAB | Facility: CLINIC | Age: 67
End: 2022-01-21
Payer: MEDICARE

## 2022-01-21 DIAGNOSIS — I21.4 NSTEMI (NON-ST ELEVATED MYOCARDIAL INFARCTION) (HCC): Primary | ICD-10-CM

## 2022-01-21 PROCEDURE — 93798 PHYS/QHP OP CAR RHAB W/ECG: CPT

## 2022-01-25 ENCOUNTER — CLINICAL SUPPORT (OUTPATIENT)
Dept: CARDIAC REHAB | Facility: CLINIC | Age: 67
End: 2022-01-25
Payer: MEDICARE

## 2022-01-25 DIAGNOSIS — I21.4 NSTEMI (NON-ST ELEVATED MYOCARDIAL INFARCTION) (HCC): Primary | ICD-10-CM

## 2022-01-25 PROCEDURE — 93798 PHYS/QHP OP CAR RHAB W/ECG: CPT

## 2022-01-25 NOTE — PROGRESS NOTES
Cardiac Rehabilitation Plan of Care   90 Day Reassessment          Today's date: 2022   # of Exercise Sessions Completed: 31  Patient name: Justino Nix      : 1955   Age: 78 y o  MRN: [id number]  Referring Physician: Benigno Gore  Cardiologist: Dimitri Coates DO  Provider: Sabina Pinto  Clinician: Ursula Hopson MS    Dx:   Encounter Diagnosis   Name Primary?  NSTEMI (non-ST elevated myocardial infarction) ([facility] [address] ) Yes     Date of onset: 9/10/2021      SUMMARY OF PROGRESS:  Kristel Amaya is compliant attending cardiac rehab exercise sessions 3x/wk  She tolerates 50-55 mins at 3 45 - 3 78  METs plus wt training  The patient reports that she is not getting as fatigued as she once was post exercise  She is tolerating progression of intensity levels to maintain RPE 3-4  Resting //80 with appropriate response to exercise reaching 128//82  NSR on tele with T wave inversion observed  RHR 57-73 ExHR   She has added home exercise 1 day/wk which includes walking on TM for 20 minutes, the same she is doing in cardiac rehab  She has decreased it from 2 days/wk to only 1 but for a longer period of time  No cardiac complaints  Patient has been working on dietary modifications with the goal of rare red/processed meats, low fat dairy, reduced added sugars and refined flours  She has been eating meals in moderation with only one helping  She has also added more fruits and vegetables  The patient is a non-smoker  When addressed, the patient admits to depression/anxiety, as she is moving her mother into a nursing home but no depression/anxiety due to her health  Patient reports excellent social/emotional support  Patient attends group educational classes on cardiac risk factor modification  Her exercise program will be progressed as tolerated to maintain RPE 4-6   The patient has the following personal goals she hopes to achieved by discharge: decrease SOB, weight loss, increase home exercise  Pt will continue to be educated on lifestyle modifications and encouraged to supplement with a home exercise program to reach the following goals in the next 30 days: continue regular home exercise 1x/wk or days she is not attending cardiac rehab and start to look into gyms to join  Medication compliance: Yes   Comments: Pt reports to be compliant with medications, but has not been carrying NTG  Fall Risk: Low   Comments: Ambulates with a steady gait with no assist device    EKG Interpretation: NSR (sinus bradycardia) with T wave inversion      EXERCISE ASSESSMENT and PLAN    Current Exercise Program in Rehab:       Frequency: 3 days/week Supplement with home exercise 2+ days/wk as tolerated     Minutes: 50-55        METS: 3 45-3 78           HR:    RPE: 4-6         Modalities: Treadmill, UBE and Lifecycle      Exercise Progression 30 Day Goals :    Frequency: 3 days/week of cardiac rehab     Supplement with home exercise 2+ days/wk as tolerated    Minutes: 40                            >150 mins/wk of moderate intensity exercise   METS: 3 5-4   HR: RHR     RPE: 4-6   Modalities: Treadmill, Airdyne bike and Lifecycle    Strength trainin-3 days / week  12-15 repetitions  1-2 sets per modality    Modalities: Leg Press, Chest Press, Pull Downs, Arm Curl and Seated Row    Home Exercise: Type: walking, Frequency: 2 days/week, Duration: 10-15 mins    Goals: 10% improvement in functional capacity - based on max METs achieved in fitness assessment, Reduced dyspnea with physical activity  0-1/10, improved DASI score by 10%, Exercise 5 days/wk, >150mins/wk of moderate intensity exercise, Resume ADLs with increased strength and start a home exercise program    Progression Toward Goals:  Patient will add home exercise 1x/wk  in the next 30 days, Will continue to educate and progress as tolerated      Education: home exercise guidelines, AHA guidelines to achieve >150 mins/wk of moderate exercise and RPE scale   Plan:education on home exercise guidelines, home exercise 30+ mins 2 days opposite CR and Education class: Risk Factors for Heart Disease  Readiness to change: Action:  (Changing behavior)      NUTRITION ASSESSMENT AND PLAN    Weight control:    Starting weight: 168 2 lbs   Current weight:   166 lbs   Waist circumference:    Startin in   Current:      Diabetes: N/A  A1c: 6 4    last measured: 2021    Lipid management: Discussed diet and lipid management, Last lipid profile 2021  Chol 206    HDL 33   and Pt has a script for repeat lipid profile  Goals:reduced BMI to < 25, LDL <100, HDL >40, TRG <150, CHOL <200, reduced waist circumference <35 inches (F), Improved Rate Your Plate score  >00, 5 4-9%  wt loss, eliminate processed meats, increase intake of fish, shellfish, cook without added fat or use vegetable oil/spray, increase intake of meatless meals, use low fat dairy, Eat 4-5 cups of fruits and vegetables daily, use olive or canola oil in baking, choose low sodium processed foods, use fat-free dressings/pereira or seldom use, choose healthy snacks: light popcorn, plain pretzels, eliminate or choose low-fat sweets and seldom eat out or choose lower fat menu items    Progression Toward Goals: Will continue to educate and progress as tolerated , Goals met: weight loss      Education: heart healthy eating  low sodium diet  nutrition for  lipid management  wt  loss   education class:  Label Reading  Plan: Education class: Reading Food Labels, switch to low fat cheeses, replace unhealthy snacks with fruits & vegs, eat fewer desserts and sweets, avoid processed foods, will try new grains like brown rice, quinoa, farro, learn how to read food labels and keep added daily sugar <25g/day  Readiness to change: Preparation:  (Getting ready to change)       PSYCHOSOCIAL ASSESSMENT AND PLAN    Emotional:  Depression assessment:  PHQ-9 = 1-4 = Minimal Depression Anxiety measure:  ALEXANDER-7 = 0-4  = Not anxious  Self-reported stress level:  2  Social support: Patient reports excellent emotional/social support from family    Goals:  Physical Fitness in [facility] Score < 3, Overall Health in [facility] Score < 3, Change in Health in [place] Score < 3  and increased energy    Progression Toward Goals: Will continue to educate and progress as tolerated , Goals met: maintained emotional health  Education: benefits of a positive support system, stress management techniques, class:  Stress and Your Health  and class:  Relaxation  Plan: Refer to [employer], Exercise and Enjoy a hobby  Readiness to change: Action:  (Changing behavior)      OTHER CORE COMPONENTS     Tobacco:   Social History     Tobacco Use   Smoking Status Never Smoker   Smokeless Tobacco Never Used       Tobacco Use Intervention:   N/A:  Patient is a non-smoker     Anginal Symptoms:  chest pressure   NTG use: Reviewed Proper use and has not been carrying- reviewed complaince with carrying and proper use    Blood pressure:    Restin//80   Exercise: 128//82    Goals: consistent BP < 130/80, reduced dietary sodium <2300mg, moderate intensity exercise >150 mins/wk and medication compliance    Progression Toward Goals: Patient will monitor home BP in the next 30 days, Will continue to educate and progress as tolerated      Education:  understanding high blood pressure and it's relationship to CAD, low sodium diet and HTN, proper use of sublingual NTG, Education class:  Common Heart Medications and Education class: Understanding Heart Disease  Plan: Avoid Processed foods, engage in regular exercise, eliminate salt shaker at the table, check labels for sodium content and monitor home BP  Readiness to change: Action:  (Changing behavior)

## 2022-01-27 ENCOUNTER — CLINICAL SUPPORT (OUTPATIENT)
Dept: CARDIAC REHAB | Facility: CLINIC | Age: 67
End: 2022-01-27
Payer: MEDICARE

## 2022-01-27 DIAGNOSIS — I21.4 NSTEMI (NON-ST ELEVATED MYOCARDIAL INFARCTION) (HCC): Primary | ICD-10-CM

## 2022-01-27 PROCEDURE — 93798 PHYS/QHP OP CAR RHAB W/ECG: CPT

## 2022-01-28 ENCOUNTER — CLINICAL SUPPORT (OUTPATIENT)
Dept: CARDIAC REHAB | Facility: CLINIC | Age: 67
End: 2022-01-28
Payer: MEDICARE

## 2022-01-28 DIAGNOSIS — I21.4 NSTEMI (NON-ST ELEVATED MYOCARDIAL INFARCTION) (HCC): Primary | ICD-10-CM

## 2022-01-28 PROCEDURE — 93798 PHYS/QHP OP CAR RHAB W/ECG: CPT

## 2022-02-01 ENCOUNTER — CLINICAL SUPPORT (OUTPATIENT)
Dept: CARDIAC REHAB | Facility: CLINIC | Age: 67
End: 2022-02-01
Payer: MEDICARE

## 2022-02-01 DIAGNOSIS — I21.4 NSTEMI (NON-ST ELEVATED MYOCARDIAL INFARCTION) (HCC): Primary | ICD-10-CM

## 2022-02-01 PROCEDURE — 93798 PHYS/QHP OP CAR RHAB W/ECG: CPT

## 2022-02-03 ENCOUNTER — CLINICAL SUPPORT (OUTPATIENT)
Dept: CARDIAC REHAB | Facility: CLINIC | Age: 67
End: 2022-02-03
Payer: MEDICARE

## 2022-02-03 DIAGNOSIS — I21.4 NSTEMI (NON-ST ELEVATED MYOCARDIAL INFARCTION) (HCC): Primary | ICD-10-CM

## 2022-02-03 PROCEDURE — 93798 PHYS/QHP OP CAR RHAB W/ECG: CPT

## 2022-02-04 ENCOUNTER — CLINICAL SUPPORT (OUTPATIENT)
Dept: CARDIAC REHAB | Facility: CLINIC | Age: 67
End: 2022-02-04
Payer: MEDICARE

## 2022-02-04 DIAGNOSIS — I21.4 NSTEMI (NON-ST ELEVATED MYOCARDIAL INFARCTION) (HCC): Primary | ICD-10-CM

## 2022-02-04 PROCEDURE — 93798 PHYS/QHP OP CAR RHAB W/ECG: CPT

## 2022-02-04 NOTE — PROGRESS NOTES
Cardiac Rehabilitation Plan of Care   Discharge          Today's date: 2022   # of Exercise Sessions Completed: 36  Patient name: Tal Estrada      : 1955   Age: 78 y o  MRN: [id number]  Referring Physician: Benigno Bernard  Cardiologist: Jeevan Farmer DO  Provider: [place]  Clinician: Sandra Navarro, MS, [facility], [facility]     Dx:   Encounter Diagnosis   Name Primary?  NSTEMI (non-ST elevated myocardial infarction) ([facility] [address] ) Yes     Date of onset: 9/10/2021      SUMMARY OF PROGRESS:  Discharge note for Osito  She had 34 9% improvement in functional capacity increasing His max METs in the submaximal TM ETT  from 4 3 to 5 8 with test termination of RPE 6  Her exercise tolerance (max METs in tolerated in cardiac rehab) increased by 35 5%  Her [facility] activity estimated MET level with ADLs and physical activity decreased by 2 9%  Her [facility] QOL improved by 15 8%  PHQ-9 score decreased from 1 to 0  ALEXANDER-7 decreased from 1 to 0  Her weight did not change  Waist circumference decreased by 0 5 inches  Rate Your Plate score improved from 49 to 50  Rip Khanna has been supplementing CR sessions with home exercise which includes walking on the TM 1day/wk for 20 minutes  She reports increased stamina, strength and reduced SOB with activity  Osito tolerates 40 mins at 3 4 - 4 2 METs plus wt training  NSR with TWI on telemetry  RHR 58 - 69, ExHR 89 - 100  Resting /70- 128/70 with appropriate response to exercise reaching 122/72 - 140/66  All group education classes on cardiac risk factor modification were attended by the patient  Discharge plans include to join a gym using [address]  Encouraged Pt to continue exercise  Frequency: 4-6 days/wk, Intensity: RPE 4-5, Time: 40-50 mins daily, 150-200 mins/wk  Pt was encouraged to continue eating heart healthy    Pt was encouraged to remain compliant with medications and f/u with cardiologist with any cardiac symptoms, medication management and updated lipid profile  Medication compliance: Yes   Comments: Pt reports to be compliant with medications  Fall Risk: Low   Comments: Ambulates with a steady gait with no assist device    EKG Interpretation: NSR (sinus bradycardia) with T wave inversion      EXERCISE ASSESSMENT and PLAN    Current Exercise Program in Rehab:       Frequency: 3 days/week Supplement with home exercise 2+ days/wk as tolerated     Minutes: 40       METS: 3 4- 4 2           HR:    RPE: 4-6         Modalities: Treadmill and Lifecycle        Strength trainin-3 days / week  12-15 repetitions  1-2 sets per modality    Modalities: Leg Press, Chest Press, Pull Downs, Arm Curl and Seated Row    Home Exercise: Type: walking, Frequency: 1 days/week, Duration: 20 mins    Goals: improved DASI score by 10%, Increase in exercise capacity by 40% - based on peak METs tolerated in cardiac rehab exercise session and Exercise 5 days/wk, >150mins/wk of moderate intensity exercise    Progression Toward Goals:  Goals met: added home exercise, 34 9% improvement in submax tm ett  , Patient will be encouraged to focus on lifestyle modifications following discharge      Education: home exercise guidelines, AHA guidelines to achieve >150 mins/wk of moderate exercise, RPE scale, class: Risk Factors for Heart Disease, exercise instructions/guidelines for discharge  and physical activity/exercise in extreme weather conditions   Plan:education on home exercise guidelines and home exercise 30+ mins 2 days opposite CR  Readiness to change: Maintenance: (Maintaining the behavior change)      NUTRITION ASSESSMENT AND PLAN    Weight control:    Starting weight: 168 2 lbs   Current weight:   168 lbs   Waist circumference:    Startin in   Current:  37 5 in     Diabetes: N/A  A1c: 6 4    last measured: 2021    Lipid management: Discussed diet and lipid management, Last lipid profile 21  Chol 162    HDL 37  LDL 82 and Pt has a script for repeat lipid profile  Goals:reduced BMI to < 25, HDL >40, reduced waist circumference <35 inches (F), Improved Rate Your Plate score  >19, 7 6-8%  wt loss, cook without added fat or use vegetable oil/spray, increase intake of meatless meals, use low fat dairy, Eat 4-5 cups of fruits and vegetables daily, choose low sodium processed foods, use fat-free dressings/pereira or seldom use, choose healthy snacks: light popcorn, plain pretzels, eliminate or choose low-fat sweets and seldom eat out or choose lower fat menu items    Progression Toward Goals: Goals not met: decresing sodium intake, using butter, has not increased fruit/vegetable intake   , Goals met: decreased processed foods  , Patient will be encouraged to focus on lifestyle modifications following discharge , Patient's weight remained stable    Education: heart healthy eating  low sodium diet  nutrition for  lipid management  wt  loss   education class: Heart Healthy Eating  education class:  Label Reading  Plan: switch to low fat cheeses, replace butter with soft spreads made with olive oil, canola or yogurt, replace unhealthy snacks with fruits & vegs, eat fewer desserts and sweets, avoid processed foods, remove salt shaker from table, eat more home cooked meals and eat out less often, will try new grains like brown rice, quinoa, farro, learn how to read food labels and keep added daily sugar <25g/day  Readiness to change: Action:  (Changing behavior)      PSYCHOSOCIAL ASSESSMENT AND PLAN    Emotional:  Depression assessment:  PHQ-9 = 0 =No Depression            Anxiety measure:  ALEXANDER-7 = 0-4  = Not anxious  Self-reported stress level:  2  Social support: Patient reports excellent emotional/social support from family    Goals:  Physical Fitness in [facility] Score < 3, Overall Health in [facility] Score < 3, Change in Health in [place] Score < 3  and increased energy    Progression Toward Goals: Goals met: maintained emotional health , Patient will be encouraged to focus on lifestyle modifications following discharge  Education: benefits of a positive support system, stress management techniques, class:  Stress and Your Health  and class:  Relaxation  Plan: Refer to [facility], Exercise and Enjoy a hobby  Readiness to change: Maintenance: (Maintaining the behavior change)      OTHER CORE COMPONENTS     Tobacco:   Social History     Tobacco Use   Smoking Status Never Smoker   Smokeless Tobacco Never Used       Tobacco Use Intervention:   N/A:  Patient is a non-smoker     Anginal Symptoms:  chest pressure   NTG use: Reviewed Proper use    Blood pressure:    Restin/70- 128/70   Exercise: 122/72- 140/66    Goals: consistent BP < 130/80, reduced dietary sodium <2300mg, moderate intensity exercise >150 mins/wk and medication compliance    Progression Toward Goals: Goals met: BP remains normal  , Patient will be encouraged to focus on lifestyle modifications following discharge      Education:  understanding high blood pressure and it's relationship to CAD, low sodium diet and HTN, proper use of sublingual NTG, Education class:  Common Heart Medications and Education class: Understanding Heart Disease  Plan: Avoid Processed foods, eliminate salt shaker at the table, check labels for sodium content and monitor home BP  Readiness to change: Maintenance: (Maintaining the behavior change)

## 2022-02-08 ENCOUNTER — APPOINTMENT (OUTPATIENT)
Dept: CARDIAC REHAB | Facility: CLINIC | Age: 67
End: 2022-02-08
Payer: MEDICARE

## 2022-02-15 ENCOUNTER — HOSPITAL ENCOUNTER (OUTPATIENT)
Dept: RADIOLOGY | Age: 67
Discharge: HOME | End: 2022-02-15
Payer: MEDICARE

## 2022-02-15 VITALS — HEIGHT: 63 IN | BODY MASS INDEX: 29.59 KG/M2 | WEIGHT: 167 LBS

## 2022-02-15 DIAGNOSIS — Z12.31 ENCOUNTER FOR SCREENING MAMMOGRAM FOR MALIGNANT NEOPLASM OF BREAST: ICD-10-CM

## 2022-02-15 PROCEDURE — 77063 BREAST TOMOSYNTHESIS BI: CPT

## 2022-02-15 PROCEDURE — 77067 SCR MAMMO BI INCL CAD: CPT

## 2022-05-16 ENCOUNTER — APPOINTMENT (OUTPATIENT)
Dept: LAB | Facility: CLINIC | Age: 67
End: 2022-05-16
Payer: MEDICARE

## 2022-05-16 DIAGNOSIS — E78.5 DYSLIPIDEMIA: ICD-10-CM

## 2022-05-16 DIAGNOSIS — E78.2 MIXED HYPERLIPIDEMIA: ICD-10-CM

## 2022-05-16 LAB
CHOLEST SERPL-MCNC: 109 MG/DL
HDLC SERPL-MCNC: 31 MG/DL
LDLC SERPL CALC-MCNC: 47 MG/DL (ref 0–100)
TRIGL SERPL-MCNC: 156 MG/DL

## 2022-05-16 PROCEDURE — 36415 COLL VENOUS BLD VENIPUNCTURE: CPT

## 2022-05-16 PROCEDURE — 80061 LIPID PANEL: CPT

## 2022-05-17 NOTE — PROGRESS NOTES
Cardiology Follow Up    Kacy Hubbard  1955  [id number]  [facility] [address]    [facility] [address] [phone]    1  Coronary artery disease involving native coronary artery of native heart without angina pectoris  POCT ECG   2  Presence of drug coated stent in LAD coronary artery     3  Presence of drug-eluting stent in left circumflex coronary artery     4  Dyslipidemia     5  Class 1 obesity due to excess calories with serious comorbidity and body mass index (BMI) of 30 0 to 30 9 in adult         Discussion/Summary:  Ms Christiano Blum is a very pleasant 42-year-old female who presents to the office today for follow-up  Since her last visit she has been feeling well  She offers no cardiac complaints  Her blood pressure is well controlled in the office today  Her most recent lipids were reviewed  Her LDL is now acceptable after the transition from atorvastatin to rosuvastatin  She will remain on dual anti-platelet therapy with aspirin and Brilinta until September  Thereafter Brilinta can be discontinued  She is otherwise asymptomatic and no testing is advised  I will see her back in the office in six months or sooner if deemed necessary  Interval History:  Ms Christiano Blum is a very pleasant 42-year-old female who presents to the office today for follow-up  Since her last visit she has not been participating in much physical activity  With the activity she does perform she feels well  She denies any exertional chest pain or shortness of breath  She denies any signs or symptoms of congestive heart failure including lower extremity edema, paroxysmal nocturnal dyspnea, orthopnea, acute weight gain or increasing abdominal girth  She denies lightheadedness, syncope or presyncope  She denies palpitations  She denies symptoms of claudication      After her last visit I transitioned to her atorvastatin to rosuvastatin  She is tolerating this without side effects      Medical Problems             Problem List     Obese    Nephrolithiasis    Encounter for follow-up surveillance of endometrial cancer    Breast cancer screening    History of endometrial cancer    NSTEMI (non-ST elevated myocardial infarction) ([address] )    Chest pain at rest    Anemia    Abnormal TSH    S/P drug eluting coronary stent placement              Past Medical History:   Diagnosis Date    Chronic kidney disease     Elevated troponin 9/12/2021    Hypertensive urgency 9/12/2021     Social History     Socioeconomic History    Marital status: /Civil Union     Spouse name: Not on file    Number of children: Not on file    Years of education: Not on file    Highest education level: Not on file   Occupational History    Not on file   Tobacco Use    Smoking status: Never Smoker    Smokeless tobacco: Never Used   Vaping Use    Vaping Use: Never used   Substance and Sexual Activity    Alcohol use: No    Drug use: No    Sexual activity: Not on file   Other Topics Concern    Not on file   Social History Narrative    Not on file     Social Determinants of Health     Financial Resource Strain: Not on file   Food Insecurity: Not on file   Transportation Needs: Not on file   Physical Activity: Not on file   Stress: Not on file   Social Connections: Not on file   Intimate Partner Violence: Not on file   Housing Stability: Not on file      Family History   Problem Relation Age of Onset    No Known Problems Mother     No Known Problems Father     No Known Problems Sister     No Known Problems Maternal Grandmother     No Known Problems Maternal Grandfather     No Known Problems Paternal Grandmother     No Known Problems Paternal Grandfather     No Known Problems Maternal Aunt     No Known Problems Maternal Aunt     No Known Problems Maternal Aunt     No Known Problems Maternal Aunt     No Known Problems Paternal Aunt  No Known Problems Son     No Known Problems Son     No Known Problems Son      Past Surgical History:   Procedure Laterality Date    HYSTERECTOMY  10/23/2016    Dr Jensen Quezada Bilateral     [place] CYSTO/URETERO W/LITHOTRIPSY &INDWELL STENT INSRT Left 6/1/2016    Procedure: CYSTOSCOPY; URETEROSCOPY; HOLMIUM LASER LITHOTRIPSY; BASKET STONE EXTRACTION; RETROGRADE PYELOGRAM; STENT PLACEMENT ;  Surgeon: Marco Antonio Patel MD;  [facility];  Service: Urology    IN CYSTO/URETERO W/LITHOTRIPSY &INDWELL STENT INSRT Left 4/22/2016    Procedure: Pardeep Spurr STENT URETERAL;  Surgeon: Marco Antonio Patel MD;  [facility];  Service: Urology    TUBAL LIGATION         Current Outpatient Medications:     aspirin 81 mg chewable tablet, Chew 1 tablet (81 mg total) daily, Disp: 30 tablet, Rfl: 0    ferrous sulfate 325 (65 Fe) mg tablet, Take 325 mg by mouth, Disp: , Rfl:     metFORMIN (GLUCOPHAGE) 500 mg tablet, Take 1 tablet by mouth in the morning and 1 tablet in the evening  Take with meals  , Disp: , Rfl:     metoprolol tartrate (LOPRESSOR) 25 mg tablet, Take 1 tablet (25 mg total) by mouth every 12 (twelve) hours, Disp: 60 tablet, Rfl: 11    nitroglycerin (NITROSTAT) 0 4 mg SL tablet, Place 1 tablet (0 4 mg total) under the tongue every 5 (five) minutes as needed for chest pain (if pain free after sl ng then nittopaste), Disp: 15 tablet, Rfl: 0    rosuvastatin (CRESTOR) 40 MG tablet, Take 1 tablet (40 mg total) by mouth daily, Disp: 90 tablet, Rfl: 3    ticagrelor (BRILINTA) 90 MG, Take 1 tablet (90 mg total) by mouth every 12 (twelve) hours, Disp: 60 tablet, Rfl: 11  No Known Allergies    Labs:     Chemistry        Component Value Date/Time    K 4 3 09/20/2021 1555     09/20/2021 1555    CO2 28 09/20/2021 1555    BUN 15 09/20/2021 1555    CREATININE 0 98 09/20/2021 1555        Component Value Date/Time    CALCIUM 9 3 09/20/2021 1555    ALKPHOS 63 09/12/2021 1634    AST 47 (H) 09/12/2021 1634    ALT 38 09/12/2021 1634            No results found for: CHOL  Lab Results   Component Value Date    HDL 31 (L) 05/16/2022     Lab Results   Component Value Date    LDLCALC 47 05/16/2022     Lab Results   Component Value Date    TRIG 156 (H) 05/16/2022     No results found for: CHOLHDL    Imaging: No results found  Review of Systems   Cardiovascular: Negative for chest pain, claudication, cyanosis, dyspnea on exertion, irregular heartbeat, leg swelling and near-syncope  All other systems reviewed and are negative  Vitals:    05/18/22 1132   BP: 112/66   Pulse: (!) 53   SpO2: 98%     Vitals:    05/18/22 1132   Weight: 75 3 kg (166 lb)     Height: 5' 3" (160 cm)   Body mass index is 29 41 kg/m²      Physical Exam:  General appearance:  Appears stated age, alert, well appearing and in no distress  HEENT:  PERRLA, EOMI, no scleral icterus, no conjunctival pallor  NECK:  Supple, No elevated JVP, no thyromegaly, no carotid bruits  HEART:  Regular rate and rhythm, normal S1/S2, no S3/S4, no murmur or rub  LUNGS:  Clear to auscultation bilaterally  ABDOMEN:  Soft, non-tender, positive bowel sounds, no rebound or guarding, no organomegaly   EXTREMITIES:  No edema  VASCULAR:  Normal pedal pulses   SKIN: No lesions or rashes on exposed skin  NEURO:  CN II-XII intact, no focal deficits

## 2022-05-18 ENCOUNTER — OFFICE VISIT (OUTPATIENT)
Dept: CARDIOLOGY CLINIC | Facility: CLINIC | Age: 67
End: 2022-05-18
Payer: MEDICARE

## 2022-05-18 VITALS
OXYGEN SATURATION: 98 % | HEIGHT: 63 IN | SYSTOLIC BLOOD PRESSURE: 112 MMHG | BODY MASS INDEX: 29.41 KG/M2 | HEART RATE: 53 BPM | WEIGHT: 166 LBS | DIASTOLIC BLOOD PRESSURE: 66 MMHG

## 2022-05-18 DIAGNOSIS — E66.09 CLASS 1 OBESITY DUE TO EXCESS CALORIES WITH SERIOUS COMORBIDITY AND BODY MASS INDEX (BMI) OF 30.0 TO 30.9 IN ADULT: ICD-10-CM

## 2022-05-18 DIAGNOSIS — E78.5 DYSLIPIDEMIA: ICD-10-CM

## 2022-05-18 DIAGNOSIS — I25.10 CORONARY ARTERY DISEASE INVOLVING NATIVE CORONARY ARTERY OF NATIVE HEART WITHOUT ANGINA PECTORIS: Primary | ICD-10-CM

## 2022-05-18 DIAGNOSIS — Z95.5 PRESENCE OF DRUG COATED STENT IN LAD CORONARY ARTERY: ICD-10-CM

## 2022-05-18 DIAGNOSIS — Z95.5 PRESENCE OF DRUG-ELUTING STENT IN LEFT CIRCUMFLEX CORONARY ARTERY: ICD-10-CM

## 2022-05-18 PROCEDURE — 93000 ELECTROCARDIOGRAM COMPLETE: CPT | Performed by: INTERNAL MEDICINE

## 2022-05-18 PROCEDURE — 99214 OFFICE O/P EST MOD 30 MIN: CPT | Performed by: INTERNAL MEDICINE

## 2022-05-21 NOTE — PATIENT INSTRUCTIONS
1  Return to the office as per chemotherapy calendar  2  Call with any new complaints or concerns 
Airway patent, TM normal bilaterally, normal appearing mouth, nose, throat, neck supple with full range of motion, no cervical adenopathy.

## 2022-10-13 DIAGNOSIS — I21.4 NSTEMI (NON-ST ELEVATED MYOCARDIAL INFARCTION) (HCC): ICD-10-CM

## 2022-10-31 NOTE — PROGRESS NOTES
Cardiology Follow Up    Zen Leyva  1955  [id number]  [facility] [address]  Phone#  [phone]  Fax#  [phone]      1  Coronary artery disease involving native coronary artery of native heart without angina pectoris     2  Presence of drug coated stent in LAD coronary artery     3  Presence of drug-eluting stent in left circumflex coronary artery     4  Dyslipidemia     5  Class 1 obesity due to excess calories with serious comorbidity and body mass index (BMI) of 30 0 to 30 9 in adult         Discussion/Summary:  Ms Vern Baker is a very pleasant 74-year-old female who presents to the office today for follow-up  Since her last visit she has been feeling well  She offers no cardiac complaints  Her blood pressure is well controlled in the office today  No changes were made to her regimen  A low-salt diet was reinforced  Her most recent lipids reveal acceptable LDL after the transition to rosuvastatin  She does have a high triglyceride level and low HDL for which therapeutic lifestyle modifications were recommended  I will reassess these prior to her next visit  Dual anti-platelet therapy has been discontinued given it has been over a year since her stent placement  She remains on aspirin monotherapy  She is otherwise asymptomatic and no testing is advised  I will see her back in the office in six months or sooner if deemed necessary  Interval History:  Ms Vern Baker is a very pleasant 74-year-old female who presents to the office today for follow-up  She recently started exercising on a treadmill  She does so a few days a week for about 10 minutes  This is mostly on flat ground  With the activity she does perform she feels well  She denies any exertional chest pain or shortness of breath    She denies any signs or symptoms of congestive heart failure including lower extremity edema, paroxysmal nocturnal dyspnea, orthopnea, acute weight gain or increasing abdominal girth  She denies lightheadedness, syncope or presyncope  She denies palpitations  She denies symptoms of claudication  Since her last visit she was diagnosed with type 2 diabetes and now is on metformin      Medical Problems             Problem List     Obese    Nephrolithiasis    Encounter for follow-up surveillance of endometrial cancer    Breast cancer screening    History of endometrial cancer    NSTEMI (non-ST elevated myocardial infarction) ([facility] [address] )    Chest pain at rest    Anemia    Abnormal TSH    S/P drug eluting coronary stent placement              Past Medical History:   Diagnosis Date   • Chronic kidney disease    • Elevated troponin 9/12/2021   • Hypertensive urgency 9/12/2021     Social History     Socioeconomic History   • Marital status: /Civil Union     Spouse name: Not on file   • Number of children: Not on file   • Years of education: Not on file   • Highest education level: Not on file   Occupational History   • Not on file   Tobacco Use   • Smoking status: Never Smoker   • Smokeless tobacco: Never Used   Vaping Use   • Vaping Use: Never used   Substance and Sexual Activity   • Alcohol use: No   • Drug use: No   • Sexual activity: Not on file   Other Topics Concern   • Not on file   Social History Narrative   • Not on file     Social Determinants of Health     Financial Resource Strain: Not on file   Food Insecurity: Not on file   Transportation Needs: Not on file   Physical Activity: Not on file   Stress: Not on file   Social Connections: Not on file   Intimate Partner Violence: Not on file   Housing Stability: Not on file      Family History   Problem Relation Age of Onset   • No Known Problems Mother    • No Known Problems Father    • No Known Problems Sister    • No Known Problems Maternal Grandmother    • No Known Problems Maternal Grandfather    • No Known Problems Paternal Grandmother    • No Known Problems Paternal Grandfather    • No Known Problems Maternal Aunt    • No Known Problems Maternal Aunt    • No Known Problems Maternal Aunt    • No Known Problems Maternal Aunt    • No Known Problems Paternal Aunt    • No Known Problems Son    • No Known Problems Son    • No Known Problems Son      Past Surgical History:   Procedure Laterality Date   • HYSTERECTOMY  10/23/2016    Dr Leda Eisenmenger   • OOPHORECTOMY Bilateral    • [place] CYSTO/URETERO W/LITHOTRIPSY &INDWELL STENT INSRT Left 6/1/2016    Procedure: CYSTOSCOPY; URETEROSCOPY; HOLMIUM LASER LITHOTRIPSY; BASKET STONE EXTRACTION; RETROGRADE PYELOGRAM; STENT PLACEMENT ;  Surgeon: Betty Mae MD;  [facility];  Service: Urology   • [place] CYSTO/URETERO W/LITHOTRIPSY &INDWELL STENT INSRT Left 4/22/2016    Procedure: CYSTOSCOPY, INSERTION STENT URETERAL;  Surgeon: Betty Mae MD;  [facility];  Service: Urology   • TUBAL LIGATION         Current Outpatient Medications:   •  aspirin 81 mg chewable tablet, Chew 1 tablet (81 mg total) daily, Disp: 30 tablet, Rfl: 0  •  ferrous sulfate 325 (65 Fe) mg tablet, Take 325 mg by mouth, Disp: , Rfl:   •  metFORMIN (GLUCOPHAGE) 500 mg tablet, Take 1 tablet by mouth in the morning and 1 tablet in the evening  Take with meals  , Disp: , Rfl:   •  metoprolol tartrate (LOPRESSOR) 25 mg tablet, Take 1 tablet (25 mg total) by mouth every 12 (twelve) hours, Disp: 60 tablet, Rfl: 5  •  nitroglycerin (NITROSTAT) 0 4 mg SL tablet, Place 1 tablet (0 4 mg total) under the tongue every 5 (five) minutes as needed for chest pain (if pain free after sl ng then nittopaste), Disp: 15 tablet, Rfl: 0  •  rosuvastatin (CRESTOR) 40 MG tablet, Take 1 tablet (40 mg total) by mouth daily, Disp: 90 tablet, Rfl: 3  •  ticagrelor (BRILINTA) 90 MG, Take 1 tablet (90 mg total) by mouth every 12 (twelve) hours, Disp: 60 tablet, Rfl: 11  No Known Allergies    Labs:     Chemistry        Component Value Date/Time    K 4 3 09/20/2021 1555     09/20/2021 1555    CO2 28 09/20/2021 1555    BUN 15 09/20/2021 1555    CREATININE 0 98 09/20/2021 1555        Component Value Date/Time    CALCIUM 9 3 09/20/2021 1555    ALKPHOS 63 09/12/2021 1634    AST 47 (H) 09/12/2021 1634    ALT 38 09/12/2021 1634            No results found for: CHOL  Lab Results   Component Value Date    HDL 31 (L) 05/16/2022     Lab Results   Component Value Date    LDLCALC 47 05/16/2022     Lab Results   Component Value Date    TRIG 156 (H) 05/16/2022     No results found for: CHOLHDL    Imaging: No results found  ROS    There were no vitals filed for this visit  There were no vitals filed for this visit  There is no height or weight on file to calculate BMI      Physical Exam:  General:  Alert and cooperative, appears stated age  HEENT:  PERRLA, EOMI, no scleral icterus, no conjunctival pallor  Neck:  No lymphadenopathy, no thyromegaly, no carotid bruits, no elevated JVP  Heart:  Regular rate and rhythm, normal S1/S2, no S3/S4, no murmur  Lungs:  Clear to auscultation bilaterally   Abdomen:  Soft, non-tender, positive bowel sounds, no rebound or guarding,   no organomegaly   Extremities:  No clubbing, cyanosis or edema   Vascular:  2+ pedal pulses  Skin:  No rashes or lesions on exposed skin  Neurologic:  Cranial nerves II-XII grossly intact without focal deficits

## 2022-11-01 ENCOUNTER — OFFICE VISIT (OUTPATIENT)
Dept: CARDIOLOGY CLINIC | Facility: CLINIC | Age: 67
End: 2022-11-01

## 2022-11-01 VITALS
SYSTOLIC BLOOD PRESSURE: 128 MMHG | DIASTOLIC BLOOD PRESSURE: 80 MMHG | BODY MASS INDEX: 30.48 KG/M2 | HEART RATE: 55 BPM | WEIGHT: 172 LBS | HEIGHT: 63 IN

## 2022-11-01 DIAGNOSIS — Z95.5 PRESENCE OF DRUG-ELUTING STENT IN LEFT CIRCUMFLEX CORONARY ARTERY: ICD-10-CM

## 2022-11-01 DIAGNOSIS — Z95.5 PRESENCE OF DRUG COATED STENT IN LAD CORONARY ARTERY: ICD-10-CM

## 2022-11-01 DIAGNOSIS — E66.09 CLASS 1 OBESITY DUE TO EXCESS CALORIES WITH SERIOUS COMORBIDITY AND BODY MASS INDEX (BMI) OF 30.0 TO 30.9 IN ADULT: ICD-10-CM

## 2022-11-01 DIAGNOSIS — E78.5 DYSLIPIDEMIA: ICD-10-CM

## 2022-11-01 DIAGNOSIS — I25.10 CORONARY ARTERY DISEASE INVOLVING NATIVE CORONARY ARTERY OF NATIVE HEART WITHOUT ANGINA PECTORIS: Primary | ICD-10-CM

## 2022-11-04 DIAGNOSIS — I21.4 NSTEMI (NON-ST ELEVATED MYOCARDIAL INFARCTION) (HCC): ICD-10-CM

## 2022-11-14 DIAGNOSIS — E78.5 DYSLIPIDEMIA: ICD-10-CM

## 2022-11-14 RX ORDER — ROSUVASTATIN CALCIUM 40 MG/1
TABLET, COATED ORAL
Qty: 90 TABLET | Refills: 3 | Status: SHIPPED | OUTPATIENT
Start: 2022-11-14

## 2023-02-21 ENCOUNTER — HOSPITAL ENCOUNTER (OUTPATIENT)
Dept: RADIOLOGY | Age: 68
Discharge: HOME | End: 2023-02-21

## 2023-02-21 VITALS — HEIGHT: 63 IN | WEIGHT: 172 LBS | BODY MASS INDEX: 30.48 KG/M2

## 2023-02-21 DIAGNOSIS — Z12.31 ENCOUNTER FOR SCREENING MAMMOGRAM FOR MALIGNANT NEOPLASM OF BREAST: ICD-10-CM

## 2023-07-07 ENCOUNTER — OFFICE VISIT (OUTPATIENT)
Dept: CARDIOLOGY CLINIC | Facility: CLINIC | Age: 68
End: 2023-07-07
Payer: MEDICARE

## 2023-07-07 VITALS
WEIGHT: 172 LBS | SYSTOLIC BLOOD PRESSURE: 118 MMHG | HEIGHT: 63 IN | OXYGEN SATURATION: 98 % | BODY MASS INDEX: 30.48 KG/M2 | HEART RATE: 56 BPM | DIASTOLIC BLOOD PRESSURE: 78 MMHG

## 2023-07-07 DIAGNOSIS — E66.09 CLASS 1 OBESITY DUE TO EXCESS CALORIES WITH SERIOUS COMORBIDITY AND BODY MASS INDEX (BMI) OF 30.0 TO 30.9 IN ADULT: ICD-10-CM

## 2023-07-07 DIAGNOSIS — I25.10 CORONARY ARTERY DISEASE INVOLVING NATIVE CORONARY ARTERY OF NATIVE HEART WITHOUT ANGINA PECTORIS: Primary | ICD-10-CM

## 2023-07-07 DIAGNOSIS — Z95.5 PRESENCE OF DRUG COATED STENT IN LAD CORONARY ARTERY: ICD-10-CM

## 2023-07-07 DIAGNOSIS — Z95.5 PRESENCE OF DRUG-ELUTING STENT IN LEFT CIRCUMFLEX CORONARY ARTERY: ICD-10-CM

## 2023-07-07 DIAGNOSIS — E78.5 DYSLIPIDEMIA: ICD-10-CM

## 2023-07-07 PROCEDURE — 99214 OFFICE O/P EST MOD 30 MIN: CPT | Performed by: INTERNAL MEDICINE

## 2023-07-07 RX ORDER — NITROGLYCERIN 0.4 MG/1
0.4 TABLET SUBLINGUAL
Qty: 25 TABLET | Refills: 3 | Status: SHIPPED | OUTPATIENT
Start: 2023-07-07

## 2023-07-07 NOTE — PROGRESS NOTES
Cardiology Follow Up    Esthela Mcconnell  1955  [id number]  [facility] [address] [address]  Phone#  [phone]  Fax#  [phone]        1. Coronary artery disease involving native coronary artery of native heart without angina pectoris  nitroglycerin (NITROSTAT) 0.4 mg SL tablet      2. Presence of drug coated stent in LAD coronary artery        3. Presence of drug-eluting stent in left circumflex coronary artery        4. Dyslipidemia        5. Class 1 obesity due to excess calories with serious comorbidity and body mass index (BMI) of 30.0 to 30.9 in adult            Discussion/Summary:  Ms. Kyle Medel is a very pleasant 30-year-old female who presents to the office today for follow-up. Since her last visit she has been feeling well. She has offers no complaints. Her blood pressure is well controlled on her current antihypertensive medication regimen to which no changes were advised. A low-salt diet was reinforced. Her most recent lipids reveal an acceptable LDL on her current statin regimen. She does have a high triglyceride level for which therapeutic lifestyle modifications were recommended. Given she is asymptomatic no testing is advised. I will see her back in the office in six months or sooner if deemed necessary. Interval History:  Ms. Kyle Medel is a very pleasant 30-year-old female who presents to the office today for follow-up. Since her last visit she has continued to exercise on a treadmill three days a week for about 10 minutes. This is mostly on flat ground. She recently was seen by the diabetic education center through [facility] for diabetic teaching in the setting of type 2 diabetes mellitus. She plans on increasing the frequency and duration of her exercise. With the activity she does perform she feels well.   She denies any exertional chest pain or shortness of breath. She denies any signs or symptoms of congestive heart failure including lower extremity edema, paroxysmal nocturnal dyspnea, orthopnea, acute weight gain or increasing abdominal girth. She denies lightheadedness, syncope or presyncope. She denies palpitations. She denies symptoms of claudication.       Medical Problems             Problem List     Obese    Nephrolithiasis    Encounter for follow-up surveillance of endometrial cancer    Breast cancer screening    History of endometrial cancer    NSTEMI (non-ST elevated myocardial infarction) ([address])    Chest pain at rest    Anemia    Abnormal TSH    S/P drug eluting coronary stent placement              Past Medical History:   Diagnosis Date   • Chronic kidney disease    • Elevated troponin 9/12/2021   • Hypertensive urgency 9/12/2021     Social History     Socioeconomic History   • Marital status: /Civil Union     Spouse name: Not on file   • Number of children: Not on file   • Years of education: Not on file   • Highest education level: Not on file   Occupational History   • Not on file   Tobacco Use   • Smoking status: Never   • Smokeless tobacco: Never   Vaping Use   • Vaping Use: Never used   Substance and Sexual Activity   • Alcohol use: No   • Drug use: No   • Sexual activity: Not on file   Other Topics Concern   • Not on file   Social History Narrative   • Not on file     Social Determinants of Health     Financial Resource Strain: Not on file   Food Insecurity: Not on file   Transportation Needs: Not on file   Physical Activity: Not on file   Stress: Not on file   Social Connections: Not on file   Intimate Partner Violence: Not on file   Housing Stability: Not on file      Family History   Problem Relation Age of Onset   • No Known Problems Mother    • No Known Problems Father    • No Known Problems Sister    • No Known Problems Maternal Grandmother    • No Known Problems Maternal Grandfather    • No Known Problems Paternal Grandmother    • No Known Problems Paternal Grandfather    • No Known Problems Maternal Aunt    • No Known Problems Maternal Aunt    • No Known Problems Maternal Aunt    • No Known Problems Maternal Aunt    • No Known Problems Paternal Aunt    • No Known Problems Son    • No Known Problems Son    • No Known Problems Son      Past Surgical History:   Procedure Laterality Date   • HYSTERECTOMY  10/23/2016    Dr. Vianca Olson   • OOPHORECTOMY Bilateral    • [place] CYSTO/URETERO W/LITHOTRIPSY &INDWELL STENT INSRT Left 6/1/2016    Procedure: CYSTOSCOPY; URETEROSCOPY; HOLMIUM LASER LITHOTRIPSY; BASKET STONE EXTRACTION; RETROGRADE PYELOGRAM; STENT PLACEMENT ;  Surgeon: Cecelia Lozano MD;  [facility];  Service: Urology   • [place] CYSTO/URETERO W/LITHOTRIPSY &INDWELL STENT INSRT Left 4/22/2016    Procedure: CYSTOSCOPY, INSERTION STENT URETERAL;  Surgeon: Cecelia Lozano MD;  [facility];  Service: Urology   • TUBAL LIGATION         Current Outpatient Medications:   •  aspirin 81 mg chewable tablet, Chew 1 tablet (81 mg total) daily, Disp: 30 tablet, Rfl: 0  •  Cholecalciferol 50 MCG (2000 UT) CAPS, Take 1 capsule by mouth daily, Disp: , Rfl:   •  ferrous sulfate 325 (65 Fe) mg tablet, Take 325 mg by mouth, Disp: , Rfl:   •  metFORMIN (GLUCOPHAGE) 500 mg tablet, Take 1 tablet by mouth in the morning and 1 tablet in the evening. Take with meals. , Disp: , Rfl:   •  metoprolol tartrate (LOPRESSOR) 25 mg tablet, TAKE 1 TABLET (25 MG TOTAL) BY MOUTH EVERY 12 (TWELVE) HOURS, Disp: 180 tablet, Rfl: 2  •  nitroglycerin (NITROSTAT) 0.4 mg SL tablet, Place 1 tablet (0.4 mg total) under the tongue every 5 (five) minutes as needed for chest pain, Disp: 25 tablet, Rfl: 3  •  rosuvastatin (CRESTOR) 40 MG tablet, TAKE 1 TABLET BY MOUTH EVERY DAY, Disp: 90 tablet, Rfl: 3  •  ticagrelor (BRILINTA) 90 MG, Take 1 tablet (90 mg total) by mouth every 12 (twelve) hours (Patient not taking: Reported on 11/1/2022), Disp: 60 tablet, Rfl: 11  No Known Allergies    Labs:     Chemistry        Component Value Date/Time    K 4.3 09/20/2021 1555     09/20/2021 1555    CO2 28 09/20/2021 1555    BUN 15 09/20/2021 1555    CREATININE 0.98 09/20/2021 1555        Component Value Date/Time    CALCIUM 9.3 09/20/2021 1555    ALKPHOS 63 09/12/2021 1634    AST 47 (H) 09/12/2021 1634    ALT 38 09/12/2021 1634            No results found for: "CHOL"  Lab Results   Component Value Date    HDL 31 (L) 05/16/2022     Lab Results   Component Value Date    LDLCALC 47 05/16/2022     Lab Results   Component Value Date    TRIG 156 (H) 05/16/2022     No results found for: "CHOLHDL"    Imaging: No results found. Review of Systems   Cardiovascular: Negative for chest pain, claudication, dyspnea on exertion, irregular heartbeat, leg swelling, near-syncope, orthopnea and palpitations. All other systems reviewed and are negative. Vitals:    07/07/23 1636   BP: 118/78   Pulse: 56   SpO2: 98%     Vitals:    07/07/23 1636   Weight: 78 kg (172 lb)     Height: 5' 3" (160 cm)   Body mass index is 30.47 kg/m².     Physical Exam:  General:  Alert and cooperative, appears stated age  HEENT:  PERRLA, EOMI, no scleral icterus, no conjunctival pallor  Neck:  No lymphadenopathy, no thyromegaly, no carotid bruits, no elevated JVP  Heart:  Regular rate and rhythm, normal S1/S2, no S3/S4, no murmur  Lungs:  Clear to auscultation bilaterally   Abdomen:  Soft, non-tender, positive bowel sounds, no rebound or guarding,   no organomegaly   Extremities:  No clubbing, cyanosis or edema   Vascular:  2+ pedal pulses  Skin:  No rashes or lesions on exposed skin  Neurologic:  Cranial nerves II-XII grossly intact without focal deficits

## 2023-08-04 DIAGNOSIS — I21.4 NSTEMI (NON-ST ELEVATED MYOCARDIAL INFARCTION) (HCC): ICD-10-CM

## 2023-08-18 ENCOUNTER — TELEPHONE (OUTPATIENT)
Dept: GASTROENTEROLOGY | Facility: CLINIC | Age: 68
End: 2023-08-18

## 2023-08-30 ENCOUNTER — PREP FOR PROCEDURE (OUTPATIENT)
Age: 68
End: 2023-08-30

## 2023-08-30 ENCOUNTER — TELEPHONE (OUTPATIENT)
Age: 68
End: 2023-08-30

## 2023-08-30 DIAGNOSIS — Z12.11 SCREENING FOR COLON CANCER: ICD-10-CM

## 2023-08-30 DIAGNOSIS — Z86.010 HISTORY OF COLON POLYPS: Primary | ICD-10-CM

## 2023-08-30 NOTE — TELEPHONE ENCOUNTER
08/30/23  Screened by: Hayley Landrum    Referring Provider Raquel    Pre- Screening: There is no height or weight on file to calculate BMI. Has patient been referred for a routine screening Colonoscopy? yes  Is the patient between 43-73 years old? yes      Previous Colonoscopy yes   If yes:    Date: 2020    Facility: [facility]    Reason: Screening      SCHEDULING STAFF: If the patient is between 39yrs-51yrs, please advise patient to confirm benefits/coverage with their insurance company for a routine screening colonoscopy, some insurance carriers will only cover at [address] [place] [address] or older. If the patient is over 66years old, please schedule an office visit. Does the patient want to see a Gastroenterologist prior to their procedure OR are they having any GI symptoms? no    Has the patient been hospitalized or had abdominal surgery in the past 6 months? no    Does the patient use supplemental oxygen? no    Does the patient take Coumadin, Lovenox, Plavix, Elliquis, Xarelto, or other blood thinning medication? no    Has the patient had a stroke, cardiac event, or stent placed in the past year? no    SCHEDULING STAFF: If patient answers NO to above questions, then schedule procedure. If patient answers YES to above questions, then schedule office appointment. If patient is between 45yrs - 49yrs, please advise patient that we will have to confirm benefits & coverage with their insurance company for a routine screening colonoscopy.       PT PASSED OA

## 2023-08-30 NOTE — TELEPHONE ENCOUNTER
Scheduled date of colonoscopy (as of today):11/03/2023  Physician performing colonoscopy:   Location of colonoscopy: [place]  Clearances: CARDIAC CLEARANCE      Pt sent miralax / Dulcolax prep instructions via email

## 2023-08-30 NOTE — TELEPHONE ENCOUNTER
ASC Assessment    If the patient answers yes to any of these questions, schedule in a hospital  Are you pregnant: No  Do you rely on a wheelchair for mobility: No  Have you been diagnosed with End Stage Renal Disease (ESRD): No  Do you need oxygen during the day: No  Have you had a heart attack or stroke within the past three months: No  Have you had a seizure within the past three months: No  Have you ever been informed by anesthesia that you have a difficult airway: No  Additional Questions  Have you had any cardiac testing or are under the care of a Cardiologist (see cardiac list): Yes (Comment: Obtain Cardiac Clearance)  Cardiac list:   Do you have an implanted cardiac defibrillator: No (Comment:  This patient should be scheduled in the hospital)    Have any bleeding problems, such as anemia or hemophilia (If patient has H&H result below 8, schedule in hospital.  H&H must be within 30 days of procedure): No    Had an organ transplant within the past 3 months: No    Do you have any present infections: No  Do you get short of breath when walking a few blocks: No  Have you been diagnosed with diabetes: Yes  Comments (provide cardiac provider information if applicable): [facility] for Heart Attack in 2019

## 2023-09-08 NOTE — ED PROCEDURE NOTE
[facility] - Emergency [facility]  [facility] MEDICINE - H&P ADMISSION NOTE    Patient Name: Rufina Olivo  MRN: [id number]  Patient Class: OP- Observation   Admission Date: 9/8/2023   Admitting Physician: RADHIKA Service   Attending Physician: Ashanti Hameed MD  Primary Care Provider: Srikanth Castaneda MD  Face-to-Face encounter date: 09/08/2023        CHIEF COMPLAINT     Chief Complaint   Patient presents with    Cough     C/o cough, nasal congestion/drainage, and body aches x1 week, denies known fever. Also reports chest pain and wheezing with breathing, denies relief with multiple inhalers.        HISTORY OF PRESENTING ILLNESS     Ms. Olivo is a 43 y/o female with PMH of CVID on IVIG, chronic pancytopenia, autoimmune hemolytic anemia, splenomegaly, and morbid obesity who presents to the ED with complaints of shortness of breath, generalized myalgias, productive cough, and congestion. Reports these symptoms started approx 6-7 days ago and have been worsening.  She has tried using home inhalers, hot baths, and OTC allergy medications without any significant relief.  Of note, patient has been seen in the ED approx 10 times in the last 6 months with the most recent admission on 7/28 after complaints of epigastric pain and found to have NSTEMI.  Patient ended up leaving AMA the next day.     In the ED, vitals stable with temp 100.0, pulse 110, RR 20, /64, and spO2 99% on room air.  Labs remarkable for WBC 1.58, H/H 7.4/26.1, platelets 38, bicarb 15, uric acid 11.2, troponin 0.058, positive jus test, and positive respiratory panel showing rhinovirus.  CXR performed which showed no acute abnormalities.  Patient given 1g acetaminophen. Internal medicine consulted for further evaluation.      PAST MEDICAL HISTORY     Past Medical History:   Diagnosis Date    Acquired hemolytic anemia, unspecified     Acute bronchitis     ADD (attention deficit disorder)     AIHA (autoimmune hemolytic anemia)      PROCEDURE  CriticalCare Time  Performed by: Prasad Bowen MD  Authorized by: Prasad Bowen MD     Critical care provider statement:     Critical care start time:  9/5/2021 6:00 PM    Critical care end time:  9/12/2021 6:35 PM    Critical care time was exclusive of:  Separately billable procedures and treating other patients and teaching time    Critical care was necessary to treat or prevent imminent or life-threatening deterioration of the following conditions: nstemi      Critical care was time spent personally by me on the following activities:  Examination of patient, evaluation of patient's response to treatment, development of treatment plan with patient or surrogate, obtaining history from patient or surrogate, discussions with primary provider, re-evaluation of patient's condition, ordering and review of radiographic studies, ordering and review of laboratory studies and ordering and performing treatments and interventions    I assumed direction of critical care for this patient from another provider in my specialty: no    Comments:      Frequent pt re-assessments and discussions with pt and           Prasad Bowen MD  09/13/21 9938 Amenorrhea, unspecified     Autoimmune hemolytic anemia     Bipolar disorder, unspecified     Breast hematoma     COVID-19     CVID (common variable immunodeficiency)     Deep vein thrombosis (DVT) of upper extremity     Essential (primary) hypertension     Hypogammaglobulinemia     Morbid obesity     Other specified noninfective gastroenteritis and colitis     Pancytopenia     Sciatica     Shingles        PAST SURGICAL HISTORY     Past Surgical History:   Procedure Laterality Date    BONE MARROW BIOPSY      BREAST BIOPSY      MEDIPORT INSERTION, SINGLE      x5    TONSILLECTOMY         FAMILY HISTORY   Reviewed and noncontributory to this case    SOCIAL HISTORY     Social History     Socioeconomic History    Marital status: Single   Tobacco Use    Smoking status: Former     Types: Cigarettes    Smokeless tobacco: Never   Substance and Sexual Activity    Alcohol use: No    Drug use: No       HOME MEDICATIONS     Prior to Admission medications    Medication Sig Start Date End Date Taking? Authorizing Provider   acetaminophen (TYLENOL) 500 MG tablet Take 500 mg by mouth daily as needed.    Provider, Historical   albuterol (PROVENTIL/VENTOLIN HFA) 90 mcg/actuation inhaler Inhale 2 puffs into the lungs every 6 (six) hours as needed for Wheezing or Shortness of Breath. 1/3/23 1/3/24  Nieves Covington FNP   amoxicillin (AMOXIL) 500 MG capsule Take 500 mg by mouth once daily. 6/7/23   Provider, Historical   busPIRone (BUSPAR) 15 MG tablet Take 15 mg by mouth 2 (two) times daily. 8/1/22   Provider, Historical   cetirizine (ZYRTEC) 10 MG tablet Take 10 mg by mouth once daily. 8/3/22   Provider, Historical   chlorhexidine (PERIDEX) 0.12 % solution Use as directed 15 mLs in the mouth or throat 2 (two) times daily. for 14 days 9/4/23 9/18/23  Chrissy Shelton, PHILIPPE   diazePAM (VALIUM) 5 MG tablet Take 1 tablet (5 mg total) by mouth every evening. 8/15/18 9/14/18  Maria Isabel Tomas MD   DULoxetine (CYMBALTA) 60 MG capsule Take 60  mg by mouth every morning. 7/14/22   Provider, Historical   ferrous sulfate 324 mg (65 mg iron) TbEC Take 65 mg by mouth.    Provider, Historical   furosemide (LASIX) 40 MG tablet Take 40 mg by mouth once daily. 7/11/22   Provider, Historical   gabapentin (NEURONTIN) 300 MG capsule Take 300 mg by mouth 3 (three) times daily. 7/11/22   Provider, Historical   GAMMAGARD S-D, IGA < 1 MCG/ML, 10 gram injection Inject into the vein. 7/21/22   Provider, Historical   guaiFENesin 100 mg/5 ml (ROBITUSSIN) 100 mg/5 mL syrup Take 5-10 mLs (100-200 mg total) by mouth every 4 (four) hours as needed for Cough. 9/6/23 9/16/23  Garcia Nguyen MD   guanFACINE (INTUNIV ER) 2 mg Tb24 Take 1 tablet by mouth once daily. 10/9/22   Provider, Historical   HYDROcodone-acetaminophen (NORCO) 7.5-325 mg per tablet Take 1 tablet by mouth every 6 (six) hours as needed for Pain. 10/3/22   Reynaldo Banks FNP   hydrOXYzine (ATARAX) 50 MG tablet Take 50 mg by mouth 2 (two) times daily. 10/9/22   Provider, Historical   IRON 100 PLUS Tab  4/16/15   Provider, Historical   miconazole NITRATE 2 % (MICOTIN) 2 % top powder Apply topically 2 (two) times daily. 8/15/18   Maria Isabel Tomas MD   nabumetone (RELAFEN) 750 MG tablet Take 1 tablet (750 mg total) by mouth 2 (two) times daily as needed for Pain. 9/6/23   Garcia Nguyen MD   ondansetron (ZOFRAN) 8 MG tablet Take 1 tablet (8 mg total) by mouth every 8 (eight) hours as needed for Nausea. 7/21/22   Silvana Osborne FNP   pantoprazole (PROTONIX) 40 MG tablet Take 40 mg by mouth once daily. 10/9/22   Provider, Historical   prazosin (MINIPRESS) 1 MG Cap Take 1 mg by mouth every evening. 11/1/22   Provider, Historical   spironolactone (ALDACTONE) 100 MG tablet Take 50 mg by mouth 2 (two) times daily. 8/1/22   Provider, Historical   spironolactone (ALDACTONE) 50 MG tablet Take 50 mg by mouth 2 (two) times a day.    Provider, Historical   traMADoL (ULTRAM) 50 mg tablet Take 1 tablet (50 mg total) by  mouth every 6 (six) hours as needed for Pain. 2/19/23   Rahul Jordan, NP   traZODone (DESYREL) 100 MG tablet Take 1 tablet (100 mg total) by mouth every evening.  Patient taking differently: Take 300 mg by mouth every evening. 8/15/18 11/2/22  Maria Isabel Tomas MD   venlafaxine (EFFEXOR-XR) 75 MG 24 hr capsule Take 75 mg by mouth every morning. 8/1/22   Provider, Historical       ALLERGIES   Ceclor [cefaclor]; Ceftriaxone; Influenza virus vaccines; Immune globulin,gamma (igg) human; Prochlorperazine; Tetanus vaccines and toxoid; and Compazine [prochlorperazine edisylate]      REVIEW OF SYSTEMS   Except as documented above, all other systems reviewed and negative    PHYSICAL EXAM     Vitals:    09/08/23 1145   BP:    Pulse: 95   Resp:    Temp:       General:  In no acute distress, resting comfortably  Head and neck:  Atraumatic, normocephalic, moist mucous membranes, supple neck  Chest:  Clear to auscultation bilaterally  Heart:  S1, S2, no appreciable murmur  Abdomen:  Soft, nontender, BS +  MSK:  Warm, no lower extremity edema, no clubbing or cyanosis  Neuro:  Alert and oriented x4, moving all extremities with good strength  Integumentary:  No obvious skin rash  Psychiatry:  Appropriate mood and affect      ASSESSMENT AND PLAN     Viral bronchitis likely 2/2 rhinovirus infection   Chronic pancytopenia/hypersplenism and autoimmune hemolytic anemia - stable   Symptomatic anemia   NAGMA  Elevated troponin - decreased since prior admission   GALLEGOS cirrhosis   Morbid obesity     -Starting IVF w/ LR @125 for continued hydration   -continue lasix/aldactone and remaining home meds   -symptomatic treatment including breathing treatments and robitussin PRN  -no indications for antibiotics at this time   -f/u blood cultures collected in ED  -tylenol for temp >100.4   -review labs in AM     DVT prophylaxis:  SCD's   __________________________________________________________________  LABS/MICRO/MEDS/DIAGNOSTICS        LABS  Recent Labs     09/08/23 0752      K 3.7   CHLORIDE 117*   CO2 15*   BUN 9.8   CREATININE 0.98   GLUCOSE 72*   CALCIUM 8.5   ALKPHOS 76   AST 17   ALT 11   ALBUMIN 3.3*     Recent Labs     09/08/23 0752   WBC 1.58  1.58*   RBC 3.36*   HCT 26.1*   MCV 77.7*   PLT 38*       MICROBIOLOGY  Microbiology Results (last 7 days)       Procedure Component Value Units Date/Time    Blood Culture #1 **CANNOT BE ORDERED STAT** [[id number]] Collected: 09/08/23 1023    Order Status: Resulted Specimen: Blood Updated: 09/08/23 1115    Blood Culture #2 **CANNOT BE ORDERED STAT** [[id number]] Collected: 09/08/23 0752    Order Status: Resulted Specimen: Blood Updated: 09/08/23 0810            MEDICATIONS   albuterol-ipratropium  3 mL Nebulization Once    busPIRone  15 mg Oral BID    [START ON 9/9/2023] DULoxetine  60 mg Oral QAM    furosemide  40 mg Oral Daily    gabapentin  300 mg Oral TID    prazosin  1 mg Oral QHS    spironolactone  50 mg Oral BID    traZODone  300 mg Oral QHS    [START ON 9/9/2023] venlafaxine  75 mg Oral QAM      INFUSIONS   lactated ringers         DIAGNOSTIC TESTS  X-Ray Chest PA And Lateral   Final Result      No acute cardiopulmonary process identified.         Electronically signed by: Melvin Cuellar   Date:    09/08/2023   Time:    08:26             Patient information was obtained from patient, patient's family, past medical records and ER records.   All diagnosis and differential diagnosis have been reviewed; assessment and plan has been documented. I have personally reviewed the labs and test results that are presently available; I have reviewed the patients medication list. I have reviewed the consulting providers response and recommendations. I have reviewed or attempted to review medical records based upon their availability.  All of the patient's questions have been addressed and answered. Patient's is agreeable to the above stated plan. I will continue to monitor closely and make  adjustments to medical management as needed.  This note was created using vufind voice recognition software that occasionally misinterpreted phrases or words.  Please contact me if any questions may rise regarding documentation to clarify verbiage.        Ashanti Hameed MD   Internal Medicine  Department of [facility]  [facility] - Emergency Dept        I Dr MELISSA Stringer assumed care of this patient today     For this patient encounter I performed the substantive portion of the visit. I reviewed the residents documentation, treatment plan and medical decision making; and I had face-face time with this patient   A. History  Patient came in with cough and nasal congestion past few days. Was in [facility] last week   Denies any fever, chills or SOB  She has chronic pancytopenia and fatty liver     B.Physical exam   Morbidly obese   Heart RRR  Lungs clear   Abdomen soft and non tender   NO FND     C. Medical decision making    Patients labs an vitals reviewed  Will cont gentle hydration  She has bronchitis from rhinovirus infection  Cont as need neb tx   As needed robitussin  She has chronic pancytopenia.   Will transfuse PRBC if Hb <7 and transfuse platelets if <20K    Observe overnight    Labs in am     Date of service: 9/8/23  Patient should be admitted for hospital observation service under my care

## 2023-10-20 ENCOUNTER — ANESTHESIA (OUTPATIENT)
Dept: ANESTHESIOLOGY | Facility: AMBULATORY SURGERY CENTER | Age: 68
End: 2023-10-20

## 2023-10-20 ENCOUNTER — ANESTHESIA EVENT (OUTPATIENT)
Dept: ANESTHESIOLOGY | Facility: AMBULATORY SURGERY CENTER | Age: 68
End: 2023-10-20

## 2023-10-27 ENCOUNTER — TELEPHONE (OUTPATIENT)
Dept: GASTROENTEROLOGY | Facility: CLINIC | Age: 68
End: 2023-10-27

## 2023-11-02 ENCOUNTER — TELEPHONE (OUTPATIENT)
Dept: GASTROENTEROLOGY | Facility: CLINIC | Age: 68
End: 2023-11-02

## 2023-11-03 ENCOUNTER — ANESTHESIA (OUTPATIENT)
Dept: GASTROENTEROLOGY | Facility: AMBULATORY SURGERY CENTER | Age: 68
End: 2023-11-03

## 2023-11-03 ENCOUNTER — ANESTHESIA EVENT (OUTPATIENT)
Dept: GASTROENTEROLOGY | Facility: AMBULATORY SURGERY CENTER | Age: 68
End: 2023-11-03

## 2023-11-03 ENCOUNTER — HOSPITAL ENCOUNTER (OUTPATIENT)
Dept: GASTROENTEROLOGY | Facility: AMBULATORY SURGERY CENTER | Age: 68
Discharge: HOME | End: 2023-11-03
Payer: MEDICARE

## 2023-11-03 VITALS
WEIGHT: 172 LBS | HEIGHT: 63 IN | TEMPERATURE: 97.2 F | SYSTOLIC BLOOD PRESSURE: 102 MMHG | DIASTOLIC BLOOD PRESSURE: 60 MMHG | RESPIRATION RATE: 18 BRPM | OXYGEN SATURATION: 96 % | HEART RATE: 58 BPM | BODY MASS INDEX: 30.48 KG/M2

## 2023-11-03 DIAGNOSIS — Z86.010 HISTORY OF COLON POLYPS: ICD-10-CM

## 2023-11-03 PROCEDURE — 88305 TISSUE EXAM BY PATHOLOGIST: CPT | Performed by: STUDENT IN AN ORGANIZED HEALTH CARE EDUCATION/TRAINING PROGRAM

## 2023-11-03 PROCEDURE — 45385 COLONOSCOPY W/LESION REMOVAL: CPT | Performed by: INTERNAL MEDICINE

## 2023-11-03 PROCEDURE — 45380 COLONOSCOPY AND BIOPSY: CPT | Performed by: INTERNAL MEDICINE

## 2023-11-03 RX ORDER — SODIUM CHLORIDE, SODIUM LACTATE, POTASSIUM CHLORIDE, CALCIUM CHLORIDE 600; 310; 30; 20 MG/100ML; MG/100ML; MG/100ML; MG/100ML
20 INJECTION, SOLUTION INTRAVENOUS CONTINUOUS
Status: DISCONTINUED | OUTPATIENT
Start: 2023-11-03 | End: 2023-11-07 | Stop reason: HOSPADM

## 2023-11-03 RX ORDER — PROPOFOL 10 MG/ML
INJECTION, EMULSION INTRAVENOUS AS NEEDED
Status: DISCONTINUED | OUTPATIENT
Start: 2023-11-03 | End: 2023-11-03

## 2023-11-03 RX ORDER — SODIUM CHLORIDE, SODIUM LACTATE, POTASSIUM CHLORIDE, CALCIUM CHLORIDE 600; 310; 30; 20 MG/100ML; MG/100ML; MG/100ML; MG/100ML
INJECTION, SOLUTION INTRAVENOUS CONTINUOUS PRN
Status: DISCONTINUED | OUTPATIENT
Start: 2023-11-03 | End: 2023-11-03

## 2023-11-03 RX ADMIN — PROPOFOL 30 MG: 10 INJECTION, EMULSION INTRAVENOUS at 08:50

## 2023-11-03 RX ADMIN — PROPOFOL 50 MG: 10 INJECTION, EMULSION INTRAVENOUS at 08:58

## 2023-11-03 RX ADMIN — PROPOFOL 50 MG: 10 INJECTION, EMULSION INTRAVENOUS at 09:05

## 2023-11-03 RX ADMIN — SODIUM CHLORIDE, SODIUM LACTATE, POTASSIUM CHLORIDE, CALCIUM CHLORIDE: 600; 310; 30; 20 INJECTION, SOLUTION INTRAVENOUS at 08:43

## 2023-11-03 RX ADMIN — PROPOFOL 100 MG: 10 INJECTION, EMULSION INTRAVENOUS at 08:47

## 2023-11-03 RX ADMIN — PROPOFOL 30 MG: 10 INJECTION, EMULSION INTRAVENOUS at 08:54

## 2023-11-03 NOTE — H&P
History and Physical - [facility]  Hailey Multani 70 y.o. female MRN: [id number]                  HPI: Hailey Multani is a 70y.o. year old female who presents for polyp      REVIEW OF SYSTEMS: Per the HPI, and otherwise unremarkable.     Historical Information   Past Medical History:   Diagnosis Date    Chronic kidney disease     Colon polyp     Elevated troponin 09/12/2021    Hypertension     Hypertensive urgency 09/12/2021     Past Surgical History:   Procedure Laterality Date    CARDIAC CATHETERIZATION      COLONOSCOPY      HYSTERECTOMY  10/23/2016    Dr. Joel Escamilla Bilateral     SD CYSTO/URETERO W/LITHOTRIPSY &INDWELL STENT INSRT Left 06/01/2016    Procedure: CYSTOSCOPY; URETEROSCOPY; HOLMIUM LASER LITHOTRIPSY; BASKET STONE EXTRACTION; RETROGRADE PYELOGRAM; STENT PLACEMENT ;  Surgeon: Mikal Morrell MD;  Location: [facility];  Service: Urology    SD CYSTO/URETERO W/LITHOTRIPSY &INDWELL STENT INSRT Left 04/22/2016    Procedure: CYSTOSCOPY, INSERTION STENT URETERAL;  Surgeon: Mikal Morrell MD;  Location: [facility];  Service: Urology    TUBAL LIGATION       Social History   Social History     Substance and Sexual Activity   Alcohol Use No     Social History     Substance and Sexual Activity   Drug Use No     Social History     Tobacco Use   Smoking Status Never   Smokeless Tobacco Never     Family History   Problem Relation Age of Onset    No Known Problems Mother     No Known Problems Father     No Known Problems Sister     No Known Problems Maternal Grandmother     No Known Problems Maternal Grandfather     No Known Problems Paternal Grandmother     No Known Problems Paternal Grandfather     No Known Problems Maternal Aunt     No Known Problems Maternal Aunt     No Known Problems Maternal Aunt     No Known Problems Maternal Aunt     No Known Problems Paternal Aunt     No Known Problems Son     No Known Problems Son     No Known Problems Son        Meds/Allergies Current Outpatient Medications:     aspirin 81 mg chewable tablet    ferrous sulfate 325 (65 Fe) mg tablet    metoprolol tartrate (LOPRESSOR) 25 mg tablet    nitroglycerin (NITROSTAT) 0.4 mg SL tablet    rosuvastatin (CRESTOR) 40 MG tablet    Cholecalciferol 50 MCG (2000 UT) CAPS    metFORMIN (GLUCOPHAGE) 500 mg tablet    ticagrelor (BRILINTA) 90 MG    No Known Allergies    Objective     /55   Pulse 60   Temp (!) 97.2 °F (36.2 °C) (Temporal)   Resp 18   Ht 5' 3" (1.6 m)   Wt 78 kg (172 lb)   SpO2 99%   BMI 30.47 kg/m²       PHYSICAL EXAM    Gen: NAD  Head: NCAT  CV: RRR  CHEST: Clear  ABD: soft, NT/ND  EXT: no edema      ASSESSMENT/PLAN:  This is a 70y.o. year old female here for Colonscopy, and she is stable and optimized for her procedure.

## 2023-11-03 NOTE — ANESTHESIA PREPROCEDURE EVALUATION
Procedure:  COLONOSCOPY    Relevant Problems   CARDIO   (+) Chest pain at rest   (+) Coronary artery disease involving native coronary artery of native heart without angina pectoris   (+) Presence of drug coated stent in LAD coronary artery   (+) Presence of drug-eluting stent in left circumflex coronary artery      /RENAL   (+) Nephrolithiasis      HEMATOLOGY   (+) Anemia      Other   (+) Class 1 obesity due to excess calories with serious comorbidity and body mass index (BMI) of 30.0 to 30.9 in adult      CAD s/p stent placement back in 2021       Physical Exam    Airway    Mallampati score: II  TM Distance: >3 FB  Neck ROM: full     Dental    upper dentures and lower dentures    Cardiovascular      Pulmonary      Other Findings        Anesthesia Plan  ASA Score- 2     Anesthesia Type- IV sedation with anesthesia with ASA Monitors. Additional Monitors:     Airway Plan:            Plan Factors-Exercise tolerance (METS): >4 METS. Chart reviewed. Patient summary reviewed. Patient is not a current smoker. Obstructive sleep apnea risk education given perioperatively. Induction- intravenous. Postoperative Plan-     Informed Consent- Anesthetic plan and risks discussed with patient. I personally reviewed this patient with the CRNA. Discussed and agreed on the Anesthesia Plan with the CRNA. Jules Glaser

## 2023-11-07 PROCEDURE — 88305 TISSUE EXAM BY PATHOLOGIST: CPT | Performed by: STUDENT IN AN ORGANIZED HEALTH CARE EDUCATION/TRAINING PROGRAM

## 2023-11-07 NOTE — RESULT ENCOUNTER NOTE
Patient had adenoma polyp removed during colonoscopy. This is considered precancerous polyp. Patient should have a follow-up colonoscopy in one year because of poor prep. If any GI symptoms call our office for evaluation accordingly.

## 2023-11-08 ENCOUNTER — TELEPHONE (OUTPATIENT)
Dept: GASTROENTEROLOGY | Facility: CLINIC | Age: 68
End: 2023-11-08

## 2023-11-08 NOTE — TELEPHONE ENCOUNTER
Left a voicemail for patient to callback for results. Provided phone number for patient to do so. 1 year colon recall entered into chart.

## 2023-11-08 NOTE — TELEPHONE ENCOUNTER
----- Message from Promise Álvarez MD sent at 11/7/2023 12:29 PM EST -----  Patient had adenoma polyp removed during colonoscopy. This is considered precancerous polyp. Patient should have a follow-up colonoscopy in one year because of poor prep. If any GI symptoms call our office for evaluation accordingly.

## 2023-11-09 NOTE — TELEPHONE ENCOUNTER
Patients GI provider:  Dr. Jesús Delcid    Number to return call: [phone]    Reason for call: Pt returning Nereyda's call re: result. Pt would like a return call.     Scheduled procedure/appointment date if applicable: N/A

## 2024-02-21 PROBLEM — Z12.39 BREAST CANCER SCREENING: Status: RESOLVED | Noted: 2020-08-20 | Resolved: 2024-02-21

## 2024-04-09 ENCOUNTER — HOSPITAL ENCOUNTER (OUTPATIENT)
Dept: RADIOLOGY | Age: 69
Discharge: HOME | End: 2024-04-09
Payer: MEDICARE

## 2024-04-09 DIAGNOSIS — Z12.31 ENCOUNTER FOR SCREENING MAMMOGRAM FOR MALIGNANT NEOPLASM OF BREAST: ICD-10-CM

## 2024-04-09 PROCEDURE — 77063 BREAST TOMOSYNTHESIS BI: CPT

## 2024-04-09 PROCEDURE — 77067 SCR MAMMO BI INCL CAD: CPT

## 2024-04-26 DIAGNOSIS — I21.4 NSTEMI (NON-ST ELEVATED MYOCARDIAL INFARCTION) (HCC): ICD-10-CM

## 2024-08-10 DIAGNOSIS — I21.4 NSTEMI (NON-ST ELEVATED MYOCARDIAL INFARCTION) (HCC): ICD-10-CM

## 2024-08-12 RX ORDER — METOPROLOL TARTRATE 25 MG/1
25 TABLET, FILM COATED ORAL EVERY 12 HOURS SCHEDULED
Qty: 180 TABLET | Refills: 3 | Status: SHIPPED | OUTPATIENT
Start: 2024-08-12 | End: 2025-08-07

## 2024-08-30 ENCOUNTER — TELEPHONE (OUTPATIENT)
Dept: CARDIOLOGY CLINIC | Facility: CLINIC | Age: 69
End: 2024-08-30

## 2024-08-30 NOTE — TELEPHONE ENCOUNTER
Called patient & left a message asking for a call back to the call center to schedule overdue follow up with Dr. Duarte -     Return in about 6 months (around 1/7/2024).

## 2024-09-03 ENCOUNTER — TELEPHONE (OUTPATIENT)
Dept: GASTROENTEROLOGY | Facility: CLINIC | Age: 69
End: 2024-09-03

## 2024-09-03 DIAGNOSIS — Z53.8 PROCEDURE NOT CARRIED OUT FOR OTHER REASONS: Primary | ICD-10-CM

## 2024-09-03 DIAGNOSIS — Z80.0 FAMILY HISTORY OF COLON CANCER: ICD-10-CM

## 2024-09-03 DIAGNOSIS — Z86.010 HX OF COLONIC POLYPS: ICD-10-CM

## 2024-09-03 NOTE — TELEPHONE ENCOUNTER
Pt needs to be scheduled for a colonoscopy for hx of poor prep Z53.8, hx of colonic polyps, fm hx of colon ca with Dr García.  Pt had Miralax prep in past so would need Golytely prep.         09/03/24  Screened by: Jyoti Song MA    Referring Provider Dr García    Pre- Screening:     There is no height or weight on file to calculate BMI.  Has patient been referred for a routine screening Colonoscopy? yes  Is the patient between 45-75 years old? yes      Previous Colonoscopy yes   If yes:    Date: recall     Facility:     Reason:       SCHEDULING STAFF: If the patient is between 45yrs-49yrs, please advise patient to confirm benefits/coverage with their insurance company for a routine screening colonoscopy, some insurance carriers will only cover at 50yrs or older. If the patient is over 75years old, please schedule an office visit.     Does the patient want to see a Gastroenterologist prior to their procedure OR are they having any GI symptoms? no    Has the patient been hospitalized or had abdominal surgery in the past 6 months? no    Does the patient use supplemental oxygen? no    Does the patient take Coumadin, Lovenox, Plavix, Elliquis, Xarelto, or other blood thinning medication? no    Has the patient had a stroke, cardiac event, or stent placed in the past year? no    SCHEDULING STAFF: If patient answers NO to above questions, then schedule procedure. If patient answers YES to above questions, then schedule office appointment.     If patient is between 45yrs - 49yrs, please advise patient that we will have to confirm benefits & coverage with their insurance company for a routine screening colonoscopy.

## 2024-09-03 NOTE — TELEPHONE ENCOUNTER
Scheduled date of colonoscopy (as of today): 11/27/24  Physician performing colonoscopy: Dr García  Location of colonoscopy:   Bowel prep reviewed with patient: Golytely via my chart   Instructions reviewed with patient by: ls  Clearances: n/a  Diabetic - Metformin

## 2024-11-01 ENCOUNTER — OFFICE VISIT (OUTPATIENT)
Dept: CARDIOLOGY CLINIC | Facility: CLINIC | Age: 69
End: 2024-11-01
Payer: MEDICARE

## 2024-11-01 VITALS
SYSTOLIC BLOOD PRESSURE: 122 MMHG | WEIGHT: 166.7 LBS | HEART RATE: 59 BPM | DIASTOLIC BLOOD PRESSURE: 70 MMHG | BODY MASS INDEX: 29.54 KG/M2 | HEIGHT: 63 IN | OXYGEN SATURATION: 97 %

## 2024-11-01 DIAGNOSIS — Z95.5 PRESENCE OF DRUG COATED STENT IN LAD CORONARY ARTERY: ICD-10-CM

## 2024-11-01 DIAGNOSIS — E66.3 OVERWEIGHT (BMI 25.0-29.9): ICD-10-CM

## 2024-11-01 DIAGNOSIS — E78.5 DYSLIPIDEMIA: ICD-10-CM

## 2024-11-01 DIAGNOSIS — I25.10 CORONARY ARTERY DISEASE INVOLVING NATIVE CORONARY ARTERY OF NATIVE HEART WITHOUT ANGINA PECTORIS: Primary | ICD-10-CM

## 2024-11-01 DIAGNOSIS — Z95.5 PRESENCE OF DRUG-ELUTING STENT IN LEFT CIRCUMFLEX CORONARY ARTERY: ICD-10-CM

## 2024-11-01 PROCEDURE — 99214 OFFICE O/P EST MOD 30 MIN: CPT | Performed by: INTERNAL MEDICINE

## 2024-11-01 PROCEDURE — 93000 ELECTROCARDIOGRAM COMPLETE: CPT | Performed by: INTERNAL MEDICINE

## 2024-11-01 NOTE — PROGRESS NOTES
Cardiology Follow Up    Osito Carlson  1955  [id number]  [facility]  [address]  [place] [address]  Phone#  [phone]  Fax#  [phone]          1. Coronary artery disease involving native coronary artery of native heart without angina pectoris  POCT ECG      2. Presence of drug coated stent in LAD coronary artery  POCT ECG      3. Presence of drug-eluting stent in left circumflex coronary artery  POCT ECG      4. Dyslipidemia  POCT ECG      5. Class 1 obesity due to excess calories with serious comorbidity and body mass index (BMI) of 30.0 to 30.9 in adult              Discussion/Summary:  Ms. Carlson is a very pleasant 69-year-old female who presents to the office today for follow-up.  Since her last visit she has been feeling well.  She has offers no complaints.    Her blood pressure is well controlled on her current antihypertensive medication regimen to which no changes were advised.  A low-salt diet was reinforced.    Her most recent lipids reveal an acceptable LDL on her current statin regimen.  She does have a high triglyceride level for which therapeutic lifestyle modifications were recommended.    Given she is asymptomatic no testing is advised.    I will see her back in the office one year or sooner if deemed necessary.     Interval History:  Ms. Carlson is a very pleasant 69-year-old female who presents to the office today for follow-up.      Since her last visit she feels well.  She exercises maybe once a week for 10 to 15 minutes on a treadmill once per week.  She also does modest housework and yard work.  She denies any exertional chest pain or shortness of breath.with the activity she performs.  She denies any signs or symptoms of congestive heart failure including lower extremity edema, paroxysmal nocturnal dyspnea, orthopnea, acute weight gain or increasing abdominal girth.  She denies lightheadedness, syncope  or presyncope.  She denies palpitations.  She denies symptoms of claudication.    Medical Problems                 Problem List       Obese    Nephrolithiasis    Encounter for follow-up surveillance of endometrial cancer    Breast cancer screening    History of endometrial cancer    NSTEMI (non-ST elevated myocardial infarction) (HCC)    Chest pain at rest    Anemia    Abnormal TSH    S/P drug eluting coronary stent placement                  Past Medical History:   Diagnosis Date    Chronic kidney disease     Colon polyp     Elevated troponin 09/12/2021    Hypertension     Hypertensive urgency 09/12/2021     Social History     Socioeconomic History    Marital status: /Civil Union     Spouse name: Not on file    Number of children: Not on file    Years of education: Not on file    Highest education level: Not on file   Occupational History    Not on file   Tobacco Use    Smoking status: Never    Smokeless tobacco: Never   Vaping Use    Vaping status: Never Used   Substance and Sexual Activity    Alcohol use: No    Drug use: No    Sexual activity: Not on file   Other Topics Concern    Not on file   Social History Narrative    Not on file     Social Determinants of Health     Financial Resource Strain: Low Risk  (8/22/2023)    [facility]    Overall Financial Resource Strain (CARDIA)     Difficulty of Paying Living Expenses: Not hard at all   Food Insecurity: No Food Insecurity (8/22/2023)    [facility]    Hunger Vital Sign     Worried About Running Out of Food in the Last Year: Never true     Ran Out of Food in the Last Year: Never true   Transportation Needs: No Transportation Needs (8/22/2023)    [facility]    PRAPARE - Transportation     Lack of Transportation (Medical): No     Lack of Transportation (Non-Medical): No    Physical Activity: Not on file   Stress: No Stress Concern Present (8/22/2023)    [facility]    [employer] - Occupational Stress Questionnaire     Feeling of Stress : Not at all   Social Connections: Moderately Integrated (8/22/2023)    [facility]    Social Connection and Isolation Panel [NHANES]     Frequency of Communication with Friends and Family: More than three times a week     Frequency of Social Gatherings with Friends and Family: More than three times a week     Attends Mandaeism Services: Never     Active Member of Clubs or Organizations: Yes     Attends Club or Organization Meetings: More than 4 times per year     Marital Status:    Intimate Partner Violence: Not At Risk (8/22/2023)    [facility]    Humiliation, Afraid, Rape, and Kick questionnaire     Fear of Current or Ex-Partner: No     Emotionally Abused: No     Physically Abused: No     Sexually Abused: No   Housing Stability: Unknown (8/22/2023)    [facility]    Housing Stability Vital Sign     Unable to Pay for Housing in the Last Year: Patient refused     Number of Places Lived in the Last Year: 1     Unstable Housing in the Last Year: No      Family History   Problem Relation Age of Onset    No Known Problems Mother     No Known Problems Father     No Known Problems Sister     No Known Problems Maternal Grandmother     No Known Problems Maternal Grandfather     No Known Problems Paternal Grandmother     No Known Problems Paternal Grandfather     No Known Problems Maternal Aunt     No Known Problems Maternal Aunt     No Known Problems Maternal Aunt     No Known Problems Maternal Aunt     No Known Problems Paternal Aunt     No Known Problems Son     No Known Problems  Son     No Known Problems Son      Past Surgical History:   Procedure Laterality Date    CARDIAC CATHETERIZATION      COLONOSCOPY      HYSTERECTOMY  10/23/2016    Dr. Rayne Pope    OOPHORECTOMY Bilateral     SC CYSTO/URETERO W/LITHOTRIPSY &INDWELL STENT INSRT Left 06/01/2016    Procedure: CYSTOSCOPY; URETEROSCOPY; HOLMIUM LASER LITHOTRIPSY; BASKET STONE EXTRACTION; RETROGRADE PYELOGRAM; STENT PLACEMENT ;  Surgeon: Selvin Goodrich MD;  [facility];  Service: Urology    SC CYSTO/URETERO W/LITHOTRIPSY &INDWELL STENT INSRT Left 04/22/2016    Procedure: CYSTOSCOPY, INSERTION STENT URETERAL;  Surgeon: Selvin Goodrich MD;  [facility];  Service: Urology    TUBAL LIGATION         Current Outpatient Medications:     aspirin 81 mg chewable tablet, Chew 1 tablet (81 mg total) daily, Disp: 30 tablet, Rfl: 0    Cholecalciferol 50 MCG (2000 UT) CAPS, Take 1 capsule by mouth daily, Disp: , Rfl:     ferrous sulfate 325 (65 Fe) mg tablet, Take 325 mg by mouth, Disp: , Rfl:     metFORMIN (GLUCOPHAGE) 500 mg tablet, Take 1 tablet by mouth in the morning and 1 tablet in the evening. Take with meals., Disp: , Rfl:     metoprolol tartrate (LOPRESSOR) 25 mg tablet, TAKE 1 TABLET (25 MG TOTAL) BY MOUTH EVERY 12 (TWELVE) HOURS, Disp: 180 tablet, Rfl: 3    nitroglycerin (NITROSTAT) 0.4 mg SL tablet, Place 1 tablet (0.4 mg total) under the tongue every 5 (five) minutes as needed for chest pain, Disp: 25 tablet, Rfl: 3    rosuvastatin (CRESTOR) 40 MG tablet, TAKE 1 TABLET BY MOUTH EVERY DAY, Disp: 90 tablet, Rfl: 3    polyethylene glycol (GOLYTELY) 4000 mL solution, Take 4,000 mL by mouth once for 1 dose, Disp: 4000 mL, Rfl: 0    ticagrelor (BRILINTA) 90 MG, Take 1 tablet (90 mg total) by mouth every 12 (twelve) hours (Patient not taking: Reported on 11/1/2022), Disp: 60 tablet, Rfl: 11  No Known Allergies    Labs:     Chemistry        Component Value Date/Time    K 4.4 05/01/2024 1037     05/01/2024 1037    CO2 28  "05/01/2024 1037    BUN 12 05/01/2024 1037    CREATININE 0.84 05/01/2024 1037        Component Value Date/Time    CALCIUM 9.9 05/01/2024 1037    ALKPHOS 35 05/01/2024 1037    AST 20 05/01/2024 1037    ALT 23 05/01/2024 1037            No results found for: \"CHOL\"  Lab Results   Component Value Date    HDL 31 (L) 05/16/2022     Lab Results   Component Value Date    LDLCALC 47 05/16/2022     Lab Results   Component Value Date    TRIG 156 (H) 05/16/2022     No results found for: \"CHOLHDL\"    Imaging: No results found.      Review of Systems   Cardiovascular:  Negative for chest pain, dyspnea on exertion, leg swelling, orthopnea and palpitations.   All other systems reviewed and are negative.      Vitals:    11/01/24 1434   BP: 122/70   Pulse:    SpO2:        Vitals:    11/01/24 1400   Weight: 75.6 kg (166 lb 11.2 oz)       Height: 5' 3\" (160 cm)   Body mass index is 29.53 kg/m².    Physical Exam:  General:  Alert and cooperative, appears stated age  HEENT:  PERRLA, EOMI, no scleral icterus, no conjunctival pallor  Neck:  No lymphadenopathy, no thyromegaly, no carotid bruits, no elevated JVP  Heart:  Regular rate and rhythm, normal S1/S2, no S3/S4, no murmur  Lungs:  Clear to auscultation bilaterally   Abdomen:  Soft, non-tender, positive bowel sounds, no rebound or guarding,   no organomegaly   Extremities:  No clubbing, cyanosis or edema   Vascular:  2+ pedal pulses  Skin:  No rashes or lesions on exposed skin  Neurologic:  Cranial nerves II-XII grossly intact without focal deficits     "

## 2024-11-27 ENCOUNTER — ANESTHESIA (OUTPATIENT)
Dept: GASTROENTEROLOGY | Facility: HOSPITAL | Age: 69
End: 2024-11-27
Payer: MEDICARE

## 2024-11-27 ENCOUNTER — ANESTHESIA EVENT (OUTPATIENT)
Dept: GASTROENTEROLOGY | Facility: HOSPITAL | Age: 69
End: 2024-11-27
Payer: MEDICARE

## 2024-11-27 ENCOUNTER — HOSPITAL ENCOUNTER (OUTPATIENT)
Dept: GASTROENTEROLOGY | Facility: HOSPITAL | Age: 69
Setting detail: OUTPATIENT SURGERY
Discharge: HOME | End: 2024-11-27
Attending: INTERNAL MEDICINE
Payer: MEDICARE

## 2024-11-27 VITALS
WEIGHT: 165.7 LBS | HEIGHT: 62 IN | BODY MASS INDEX: 30.49 KG/M2 | SYSTOLIC BLOOD PRESSURE: 115 MMHG | RESPIRATION RATE: 12 BRPM | HEART RATE: 58 BPM | TEMPERATURE: 98.1 F | DIASTOLIC BLOOD PRESSURE: 56 MMHG | OXYGEN SATURATION: 96 %

## 2024-11-27 DIAGNOSIS — Z86.0101 HX OF ADENOMATOUS COLONIC POLYPS: ICD-10-CM

## 2024-11-27 DIAGNOSIS — Z80.0 FAMILY HISTORY OF COLON CANCER: ICD-10-CM

## 2024-11-27 DIAGNOSIS — Z53.8 PROCEDURE NOT CARRIED OUT FOR OTHER REASONS: ICD-10-CM

## 2024-11-27 PROCEDURE — 88305 TISSUE EXAM BY PATHOLOGIST: CPT | Performed by: PATHOLOGY

## 2024-11-27 PROCEDURE — 45385 COLONOSCOPY W/LESION REMOVAL: CPT | Performed by: INTERNAL MEDICINE

## 2024-11-27 RX ORDER — SODIUM CHLORIDE, SODIUM LACTATE, POTASSIUM CHLORIDE, CALCIUM CHLORIDE 600; 310; 30; 20 MG/100ML; MG/100ML; MG/100ML; MG/100ML
INJECTION, SOLUTION INTRAVENOUS CONTINUOUS PRN
Status: DISCONTINUED | OUTPATIENT
Start: 2024-11-27 | End: 2024-11-27

## 2024-11-27 RX ORDER — SIMETHICONE 40MG/0.6ML
SUSPENSION, DROPS(FINAL DOSAGE FORM)(ML) ORAL AS NEEDED
Status: COMPLETED | OUTPATIENT
Start: 2024-11-27 | End: 2024-11-27

## 2024-11-27 RX ORDER — PROPOFOL 10 MG/ML
INJECTION, EMULSION INTRAVENOUS AS NEEDED
Status: DISCONTINUED | OUTPATIENT
Start: 2024-11-27 | End: 2024-11-27

## 2024-11-27 RX ORDER — LIDOCAINE HYDROCHLORIDE 20 MG/ML
INJECTION, SOLUTION EPIDURAL; INFILTRATION; INTRACAUDAL; PERINEURAL AS NEEDED
Status: DISCONTINUED | OUTPATIENT
Start: 2024-11-27 | End: 2024-11-27

## 2024-11-27 RX ADMIN — LIDOCAINE HYDROCHLORIDE 100 MG: 20 INJECTION, SOLUTION EPIDURAL; INFILTRATION; INTRACAUDAL; PERINEURAL at 08:10

## 2024-11-27 RX ADMIN — PROPOFOL 50 MG: 10 INJECTION, EMULSION INTRAVENOUS at 08:18

## 2024-11-27 RX ADMIN — SODIUM CHLORIDE, SODIUM LACTATE, POTASSIUM CHLORIDE, AND CALCIUM CHLORIDE: .6; .31; .03; .02 INJECTION, SOLUTION INTRAVENOUS at 08:08

## 2024-11-27 RX ADMIN — PROPOFOL 50 MG: 10 INJECTION, EMULSION INTRAVENOUS at 08:23

## 2024-11-27 RX ADMIN — Medication 40 MG: at 08:16

## 2024-11-27 RX ADMIN — PROPOFOL 100 MG: 10 INJECTION, EMULSION INTRAVENOUS at 08:10

## 2024-11-27 RX ADMIN — PROPOFOL 50 MG: 10 INJECTION, EMULSION INTRAVENOUS at 08:14

## 2024-11-27 NOTE — INTERVAL H&P NOTE
H&P reviewed. After examining the patient I find no changes in the patients condition since the H&P had been written.    Vitals:    11/27/24 0722   BP: 143/62   Pulse: (!) 54   Resp: 16   Temp: 97.7 °F (36.5 °C)   SpO2: 96%

## 2024-11-27 NOTE — ANESTHESIA POSTPROCEDURE EVALUATION
Post-Op Assessment Note    CV Status:  Stable  Pain Score: 0    Pain management: adequate       Mental Status:  Alert and awake   Hydration Status:  Euvolemic   PONV Controlled:  Controlled   Airway Patency:  Patent     Post Op Vitals Reviewed: Yes    No anethesia notable event occurred.    Staff: Anesthesiologist, CRNA           Last Filed PACU Vitals:  Vitals Value Taken Time   Temp     Pulse     BP     Resp     SpO2         Modified Michelle:  Activity: 2 (11/27/2024  7:11 AM)  Respiration: 2 (11/27/2024  7:11 AM)  Circulation: 2 (11/27/2024  7:11 AM)  Consciousness: 2 (11/27/2024  7:11 AM)  Oxygen Saturation: 2 (11/27/2024  7:11 AM)  Modified Michelle Score: 10 (11/27/2024  7:11 AM)

## 2024-11-27 NOTE — ANESTHESIA PREPROCEDURE EVALUATION
Procedure:  COLONOSCOPY    Relevant Problems   ANESTHESIA (within normal limits)      CARDIO   (+) Chest pain at rest   (+) Coronary artery disease involving native coronary artery of native heart without angina pectoris   (+) Presence of drug coated stent in LAD coronary artery   (+) Presence of drug-eluting stent in left circumflex coronary artery      ENDO (within normal limits)      /RENAL   (+) Nephrolithiasis      HEMATOLOGY   (+) Anemia      PULMONARY (within normal limits)        Physical Exam    Airway    Mallampati score: III  TM Distance: >3 FB  Neck ROM: full     Dental    upper dentures and lower dentures    Cardiovascular  Rhythm: regular, Rate: normal    Pulmonary   Breath sounds clear to auscultation    Other Findings  post-pubertal.      Anesthesia Plan  ASA Score- 3     Anesthesia Type- IV sedation with anesthesia with ASA Monitors.         Additional Monitors:     Airway Plan:            Plan Factors-Exercise tolerance (METS): >4 METS.    Chart reviewed. EKG reviewed.   Patient summary reviewed.    Patient is not a current smoker.              Induction-     Postoperative Plan-         Informed Consent- Anesthetic plan and risks discussed with patient.  I personally reviewed this patient with the CRNA. Discussed and agreed on the Anesthesia Plan with the CRNA..

## 2024-11-27 NOTE — H&P
History and Physical - [facility]  Osito Carlson 69 y.o. female MRN: [id number]                  HPI: Osito Carlson is a 69 y.o. year old female who presents for history of polyp      REVIEW OF SYSTEMS: Per the HPI, and otherwise unremarkable.    Historical Information   Past Medical History:   Diagnosis Date    Chronic kidney disease     Colon polyp     Elevated troponin 09/12/2021    Hypertension     Hypertensive urgency 09/12/2021     Past Surgical History:   Procedure Laterality Date    CARDIAC CATHETERIZATION      COLONOSCOPY      HYSTERECTOMY  10/23/2016    Dr. Rayne Pope    OOPHORECTOMY Bilateral     IN CYSTO/URETERO W/LITHOTRIPSY &INDWELL STENT INSRT Left 06/01/2016    Procedure: CYSTOSCOPY; URETEROSCOPY; HOLMIUM LASER LITHOTRIPSY; BASKET STONE EXTRACTION; RETROGRADE PYELOGRAM; STENT PLACEMENT ;  Surgeon: Selvin Goodrich MD;  [facility];  Service: Urology    IN CYSTO/URETERO W/LITHOTRIPSY &INDWELL STENT INSRT Left 04/22/2016    Procedure: CYSTOSCOPY, INSERTION STENT URETERAL;  Surgeon: Selvin Goodrich MD;  [facility];  Service: Urology    TUBAL LIGATION       Social History   Social History     Substance and Sexual Activity   Alcohol Use No     Social History     Substance and Sexual Activity   Drug Use No     Social History     Tobacco Use   Smoking Status Never   Smokeless Tobacco Never     Family History   Problem Relation Age of Onset    No Known Problems Mother     No Known Problems Father     No Known Problems Sister     No Known Problems Maternal Grandmother     No Known Problems Maternal Grandfather     No Known Problems Paternal Grandmother     No Known Problems Paternal Grandfather     No Known Problems Maternal Aunt     No Known Problems Maternal Aunt     No Known Problems Maternal Aunt     No Known Problems Maternal Aunt     No Known Problems Paternal Aunt     No Known Problems Son     No Known Problems Son     No Known Problems Son        Meds/Allergies  "      Current Outpatient Medications:     aspirin 81 mg chewable tablet    Cholecalciferol 50 MCG (2000 UT) CAPS    ferrous sulfate 325 (65 Fe) mg tablet    metFORMIN (GLUCOPHAGE) 500 mg tablet    metoprolol tartrate (LOPRESSOR) 25 mg tablet    rosuvastatin (CRESTOR) 40 MG tablet    nitroglycerin (NITROSTAT) 0.4 mg SL tablet    polyethylene glycol (GOLYTELY) 4000 mL solution    ticagrelor (BRILINTA) 90 MG    No Known Allergies    Objective     /62   Pulse (!) 54   Temp 97.7 °F (36.5 °C) (Temporal)   Resp 16   Ht 5' 2\" (1.575 m)   Wt 75.2 kg (165 lb 11.2 oz)   SpO2 96%   BMI 30.31 kg/m²       PHYSICAL EXAM    Gen: NAD  Head: NCAT  CV: RRR  CHEST: Clear  ABD: soft, NT/ND  EXT: no edema      ASSESSMENT/PLAN:  This is a 69 y.o. year old female here for colonoscopy, and she is stable and optimized for her procedure.        "

## 2024-12-03 ENCOUNTER — RESULTS FOLLOW-UP (OUTPATIENT)
Dept: GASTROENTEROLOGY | Facility: CLINIC | Age: 69
End: 2024-12-03

## 2024-12-03 PROCEDURE — 88305 TISSUE EXAM BY PATHOLOGIST: CPT | Performed by: PATHOLOGY

## 2024-12-03 NOTE — RESULT ENCOUNTER NOTE
Please call patient biopsy was okay.  Repeat screening colonoscopy in 10 years.  If any GI symptoms then patient should call our office for evaluation accordingly.

## 2024-12-12 NOTE — TELEPHONE ENCOUNTER
Bonnie Delgado (:  1988) is a 36 y.o. female, New patient, here for evaluation of the following chief complaint(s):  Other (Ear pressure, sinus [id number]/ had food poisoning last night)      Vitals:    24 1320   BP: 102/78   Pulse: (!) 123   Temp: 97.2 °F (36.2 °C)   SpO2: 99%       ASSESSMENT/PLAN:  1. Non-recurrent acute suppurative otitis media of right ear without spontaneous rupture of tympanic membrane  -     amoxicillin-clavulanate (AUGMENTIN) 875-125 MG per tablet; Take 1 tablet by mouth 2 times daily for 10 days, Disp-20 tablet, R-0Normal  2. Non-recurrent acute serous otitis media of both ears  -     amoxicillin-clavulanate (AUGMENTIN) 875-125 MG per tablet; Take 1 tablet by mouth 2 times daily for 10 days, Disp-20 tablet, R-0Normal  -     fluticasone (FLONASE) 50 MCG/ACT nasal spray; Take 1 spray each nostril at night, Disp-1 each, R-1Normal  -     cetirizine (ZYRTEC) 10 MG tablet; Take 1 tablet by mouth daily, Disp-30 tablet, R-0Normal  3. Acute non-recurrent pansinusitis  -     amoxicillin-clavulanate (AUGMENTIN) 875-125 MG per tablet; Take 1 tablet by mouth 2 times daily for 10 days, Disp-20 tablet, R-0Normal  -     fluticasone (FLONASE) 50 MCG/ACT nasal spray; Take 1 spray each nostril at night, Disp-1 each, R-1Normal  -     cetirizine (ZYRTEC) 10 MG tablet; Take 1 tablet by mouth daily, Disp-30 tablet, R-0Normal  4. Nausea and vomiting, unspecified vomiting type  -     ondansetron (ZOFRAN-ODT) 4 MG disintegrating tablet; Take 1 tablet by mouth 3 times daily as needed for Nausea or Vomiting, Disp-21 tablet, R-0Normal        Return if symptoms worsen or fail to improve.      SUBJECTIVE/OBJECTIVE:    Pt states she ate mexican last night and ever since then she has been throwing up.  Requesting medication for her nausea/vomiting.    Sinusitis  This is a new problem. Episode onset: x2 weeks ear pressure, sinus pressure. The problem has been gradually worsening since onset. There has  lmom confirming pt's colonoscopy scheduled on 11/3/23 at [facility] with Dr Jesús Delcid.  Informed [facility] would be calling 1-2 days priro with the arrival time. Advised pt to please review the diabetic medication instructions as specific instructions for each medication and some may need to be held. Informed of clear liquid diet day prior as well as the bowel cleansing preparation. Informed would need a  the day of the procedure due to being under sedation. I asked pt to please call back to confirm. Will call pt again in a few days if do not hear back from her.

## 2025-04-10 ENCOUNTER — HOSPITAL ENCOUNTER (OUTPATIENT)
Dept: RADIOLOGY | Age: 70
Discharge: HOME | End: 2025-04-10
Payer: MEDICARE

## 2025-04-10 VITALS — WEIGHT: 168 LBS | HEIGHT: 62 IN | BODY MASS INDEX: 30.91 KG/M2

## 2025-04-10 DIAGNOSIS — Z12.31 ENCOUNTER FOR SCREENING MAMMOGRAM FOR MALIGNANT NEOPLASM OF BREAST: ICD-10-CM

## 2025-04-10 PROCEDURE — 77063 BREAST TOMOSYNTHESIS BI: CPT

## 2025-04-10 PROCEDURE — 77067 SCR MAMMO BI INCL CAD: CPT

## 2025-04-25 ENCOUNTER — HOSPITAL ENCOUNTER (EMERGENCY)
Facility: HOSPITAL | Age: 70
Discharge: HOME | End: 2025-04-25
Attending: EMERGENCY MEDICINE
Payer: MEDICARE

## 2025-04-25 ENCOUNTER — APPOINTMENT (EMERGENCY)
Dept: RADIOLOGY | Facility: HOSPITAL | Age: 70
End: 2025-04-25
Payer: MEDICARE

## 2025-04-25 VITALS
RESPIRATION RATE: 18 BRPM | SYSTOLIC BLOOD PRESSURE: 144 MMHG | TEMPERATURE: 98.1 F | HEART RATE: 56 BPM | DIASTOLIC BLOOD PRESSURE: 71 MMHG | OXYGEN SATURATION: 98 %

## 2025-04-25 DIAGNOSIS — S46.312A TRICEPS STRAIN, LEFT, INITIAL ENCOUNTER: Primary | ICD-10-CM

## 2025-04-25 DIAGNOSIS — M54.12 RADICULITIS OF LEFT CERVICAL REGION: ICD-10-CM

## 2025-04-25 DIAGNOSIS — M50.30 DDD (DEGENERATIVE DISC DISEASE), CERVICAL: ICD-10-CM

## 2025-04-25 DIAGNOSIS — I10 HYPERTENSION: ICD-10-CM

## 2025-04-25 LAB
2HR DELTA HS TROPONIN: 1 NG/L
ANION GAP SERPL CALCULATED.3IONS-SCNC: 5 MMOL/L (ref 4–13)
APTT PPP: 26 SECONDS (ref 23–34)
ATRIAL RATE: 54 BPM
BASOPHILS # BLD AUTO: 0.06 THOUSANDS/ÂΜL (ref 0–0.1)
BASOPHILS NFR BLD AUTO: 1 % (ref 0–1)
BUN SERPL-MCNC: 16 MG/DL (ref 5–25)
CALCIUM SERPL-MCNC: 9.5 MG/DL (ref 8.4–10.2)
CARDIAC TROPONIN I PNL SERPL HS: 11 NG/L (ref ?–50)
CARDIAC TROPONIN I PNL SERPL HS: 12 NG/L (ref ?–50)
CHLORIDE SERPL-SCNC: 109 MMOL/L (ref 96–108)
CO2 SERPL-SCNC: 27 MMOL/L (ref 21–32)
CREAT SERPL-MCNC: 0.79 MG/DL (ref 0.6–1.3)
EOSINOPHIL # BLD AUTO: 0.18 THOUSAND/ÂΜL (ref 0–0.61)
EOSINOPHIL NFR BLD AUTO: 3 % (ref 0–6)
ERYTHROCYTE [DISTWIDTH] IN BLOOD BY AUTOMATED COUNT: 13 % (ref 11.6–15.1)
GFR SERPL CREATININE-BSD FRML MDRD: 75 ML/MIN/1.73SQ M
GLUCOSE SERPL-MCNC: 120 MG/DL (ref 65–140)
HCT VFR BLD AUTO: 40.5 % (ref 34.8–46.1)
HGB BLD-MCNC: 13.2 G/DL (ref 11.5–15.4)
IMM GRANULOCYTES # BLD AUTO: 0.01 THOUSAND/UL (ref 0–0.2)
IMM GRANULOCYTES NFR BLD AUTO: 0 % (ref 0–2)
INR PPP: 0.98 (ref 0.85–1.19)
LYMPHOCYTES # BLD AUTO: 1.92 THOUSANDS/ÂΜL (ref 0.6–4.47)
LYMPHOCYTES NFR BLD AUTO: 33 % (ref 14–44)
MAGNESIUM SERPL-MCNC: 2.2 MG/DL (ref 1.9–2.7)
MCH RBC QN AUTO: 28.9 PG (ref 26.8–34.3)
MCHC RBC AUTO-ENTMCNC: 32.6 G/DL (ref 31.4–37.4)
MCV RBC AUTO: 89 FL (ref 82–98)
MONOCYTES # BLD AUTO: 0.6 THOUSAND/ÂΜL (ref 0.17–1.22)
MONOCYTES NFR BLD AUTO: 10 % (ref 4–12)
NEUTROPHILS # BLD AUTO: 3.14 THOUSANDS/ÂΜL (ref 1.85–7.62)
NEUTS SEG NFR BLD AUTO: 53 % (ref 43–75)
NRBC BLD AUTO-RTO: 0 /100 WBCS
P AXIS: 61 DEGREES
PLATELET # BLD AUTO: 210 THOUSANDS/UL (ref 149–390)
PMV BLD AUTO: 10.4 FL (ref 8.9–12.7)
POTASSIUM SERPL-SCNC: 4 MMOL/L (ref 3.5–5.3)
PR INTERVAL: 176 MS
PROTHROMBIN TIME: 13.7 SECONDS (ref 12.3–15)
QRS AXIS: 34 DEGREES
QRSD INTERVAL: 104 MS
QT INTERVAL: 464 MS
QTC INTERVAL: 440 MS
RBC # BLD AUTO: 4.56 MILLION/UL (ref 3.81–5.12)
SODIUM SERPL-SCNC: 141 MMOL/L (ref 135–147)
T WAVE AXIS: 171 DEGREES
VENTRICULAR RATE: 54 BPM
WBC # BLD AUTO: 5.91 THOUSAND/UL (ref 4.31–10.16)

## 2025-04-25 PROCEDURE — 71046 X-RAY EXAM CHEST 2 VIEWS: CPT

## 2025-04-25 PROCEDURE — 72050 X-RAY EXAM NECK SPINE 4/5VWS: CPT

## 2025-04-25 PROCEDURE — 73060 X-RAY EXAM OF HUMERUS: CPT

## 2025-04-25 PROCEDURE — 84484 ASSAY OF TROPONIN QUANT: CPT | Performed by: PHYSICIAN ASSISTANT

## 2025-04-25 PROCEDURE — 36415 COLL VENOUS BLD VENIPUNCTURE: CPT | Performed by: PHYSICIAN ASSISTANT

## 2025-04-25 PROCEDURE — 80048 BASIC METABOLIC PNL TOTAL CA: CPT | Performed by: PHYSICIAN ASSISTANT

## 2025-04-25 PROCEDURE — 85730 THROMBOPLASTIN TIME PARTIAL: CPT | Performed by: PHYSICIAN ASSISTANT

## 2025-04-25 PROCEDURE — 99284 EMERGENCY DEPT VISIT MOD MDM: CPT

## 2025-04-25 PROCEDURE — 99285 EMERGENCY DEPT VISIT HI MDM: CPT | Performed by: PHYSICIAN ASSISTANT

## 2025-04-25 PROCEDURE — 85610 PROTHROMBIN TIME: CPT | Performed by: PHYSICIAN ASSISTANT

## 2025-04-25 PROCEDURE — 96374 THER/PROPH/DIAG INJ IV PUSH: CPT

## 2025-04-25 PROCEDURE — 85025 COMPLETE CBC W/AUTO DIFF WBC: CPT | Performed by: PHYSICIAN ASSISTANT

## 2025-04-25 PROCEDURE — 93005 ELECTROCARDIOGRAM TRACING: CPT

## 2025-04-25 PROCEDURE — 83735 ASSAY OF MAGNESIUM: CPT | Performed by: PHYSICIAN ASSISTANT

## 2025-04-25 PROCEDURE — 93010 ELECTROCARDIOGRAM REPORT: CPT | Performed by: INTERNAL MEDICINE

## 2025-04-25 RX ORDER — HYDRALAZINE HYDROCHLORIDE 20 MG/ML
10 INJECTION INTRAMUSCULAR; INTRAVENOUS ONCE
Status: COMPLETED | OUTPATIENT
Start: 2025-04-25 | End: 2025-04-25

## 2025-04-25 RX ADMIN — HYDRALAZINE HYDROCHLORIDE 10 MG: 20 INJECTION, SOLUTION INTRAMUSCULAR; INTRAVENOUS at 11:04

## 2025-04-25 NOTE — ED PROVIDER NOTES
Time reflects when diagnosis was documented in both MDM as applicable and the Disposition within this note       Time User Action Codes Description Comment    4/25/2025  1:44 PM Gutzweiler, Julie Add [S46.312A] Triceps strain, left, initial encounter     4/25/2025  1:52 PM Gutzweiler, Julie Add [M54.12] Radiculitis of left cervical region     4/25/2025  1:52 PM Gutzweiler, Julie Add [M50.30] DDD (degenerative disc disease), cervical     4/25/2025  1:52 PM Gutzweiler, Julie Modify [M50.30] DDD (degenerative disc disease), cervical C 5-6    4/25/2025  7:54 PM Gutzweiler, Julie Add [I10] Hypertension           ED Disposition       ED Disposition   Discharge    Condition   Stable    Date/Time   Fri Apr 25, 2025  1:44 PM    Comment   Osito Carlson discharge to home/self care.                   Assessment & Plan       Medical Decision Making  This 70-year-old female presents to the emergency room with a past medical history of an MI with unstable angina, chronic kidney disease, colon polyps, hypertension, hypertensive urgency.  Complaints of the 2-week history of intermittent episodes of bilateral arm pain.  She states when she had her mammogram they had her arm twisted in all different directions.  She had multiple views of her breast because of her breast size.  She states that the week later she had noticed some tenderness palpated over the posterior aspect of her left humerus.  She noticed a lump.  She denies any decreased range of motion of her shoulder or elbow.  She complains of pain that radiates down to her left forearm.  She complains of numbness to her left forearm area.  It does not extend to her hand.  She has no weakness of her hand.  She states occasionally it radiates to the posterior aspect of her left shoulder.  She denies any specific chest pain.  She does have a past medical history that is positive for previous MI and has Nitrostat at home.  She did not try any of that medication for her symptoms.   She presents with a blood pressure 235/102.  Repeat chest pressure of 196/90.  She did not take her metoprolol this morning.  She complains of some left-sided neck pain that is present with range of motion of her neck.  She denies any known injury.  She is not a smoker.  She does not drink alcohol.  She has no history of street drug use.  She has a positive family history of her father having a heart attack at the age of 60.    Physical exam-the patient's present pressure is 196/90.  Her heart rate is 50.  This 70-year-old female is alert and oriented x 3.  She is in no acute distress.  Inspection of her neck-it is atraumatic upon inspection.  There is no bony tenderness palpated.  She has some tenderness palpated over the left levator scapula.  She has pain with right lateral flexion and turning her head towards the right that pulls over her left side of her neck.  She has full flexion and extension of her neck with no discomfort.  Her shoulder is atraumatic upon inspection.  She has full range of motion of her shoulder in all planes.  Inspection of her left humerus.  She does have some soft tissue swelling present over the mid to distal aspect of her triceps muscle.  It is tender upon palpation.  There is a soft tissue prominence there.  This is most likely secondary to a triceps tear.  Patient has good range of her motion of her left elbow.  She does have pain with extension which reproduces her pain in that left arm.  Remainder of the left upper extremity has full range of motion.  Reflexes are +2 and symmetrical.  Motor and sensory are intact to the axillary, radial, medial, ulnar aspect of the both hands.  Lungs are clear to auscultation.  Heart-bradycardic rhythm in the mid 50s, regular, no murmur. Abdomen is protuberant, soft nondistended nontender without mass or hepatosplenomegaly.    Differential diagnosis includes but is not limited to triceps tear, muscle strain, cervical radiculopathy, cervical  radiculitis, cervical strain, acute coronary syndrome, myocarditis, pericarditis, GERD, esophagitis, PE, aortic dissection.    Laboratory studies were taken and were reviewed.  Patient's EKG showed T wave inversion in the lateral leads that are unchanged from a previous tracing from 2022.  Patient's troponin was normal 12 with a 2-hour of 11.  Her heart score was 6 so she was given a referral to cardiology for follow-up stay.  I did not feel that her pain in her leg left arm was cardiac in nature.  Patient had some cervical spine tenderness as well.  X-rays of the C-spine as well as the left humerus demonstrated degenerative disc disease at at the C5-6 level with anterior spurring.  X-rays of the humerus do not demonstrate any acute abnormality.    Plan the patient was diagnosed with a strain of her left triceps muscle.  She was also demonstrated with degenerative disc disease of the cervical spine with cervical radiculitis left arm .  She was given a referral to orthopedics for follow-up.  She was placed in a sling and sling for comfort.  She was instructed due to the fact that she has chronic disease to take extra strength Tylenol every 6 hours as needed for pain.  She may ice and elevate her arm for comfort.    Amount and/or Complexity of Data Reviewed  Labs: ordered.     Details: Laboratory studies were taken and were reviewed.  Troponin at 0-hour was 11, 2 hours was 12.  Heart score was 6.  Patient was given a referral to ambulatory cardiology with a heart score of 6.  Her CBC was normal range, mag was 2.2 and her chemistries were within normal limits.  Radiology: ordered and independent interpretation performed.     Details: Portable chest x-ray did not demonstrate any acute cardiopulmonary disease.  X-rays of the left humerus were negative for any acute abnormalities.  X-rays of the cervical spine demonstrated degenerative disc disease at the C5-6 level with anterior spurring at the C5-6 level.  This is  consistent with degenerative disc disease as well as degenerative arthritis of the cervical spine.  ECG/medicine tests: ordered and independent interpretation performed.     Details: T wave inversion in the lateral leads unchanged from previous EKGs.    Risk  Prescription drug management.             Medications   hydrALAZINE (APRESOLINE) injection 10 mg (10 mg Intravenous Given 4/25/25 1104)       ED Risk Strat Scores   HEART Risk Score      Flowsheet Row Most Recent Value   Heart Score Risk Calculator    History 0 Filed at: 04/25/2025 1208   ECG 2 Filed at: 04/25/2025 1208   Age 2 Filed at: 04/25/2025 1208   Risk Factors 2 Filed at: 04/25/2025 1208   Troponin 0 Filed at: 04/25/2025 1208   HEART Score 6 Filed at: 04/25/2025 1208          HEART Risk Score      Flowsheet Row Most Recent Value   Heart Score Risk Calculator    History 0 Filed at: 04/25/2025 1208   ECG 2 Filed at: 04/25/2025 1208   Age 2 Filed at: 04/25/2025 1208   Risk Factors 2 Filed at: 04/25/2025 1208   Troponin 0 Filed at: 04/25/2025 1208   HEART Score 6 Filed at: 04/25/2025 1208                      No data recorded        SBIRT 20yo+      Flowsheet Row Most Recent Value   Initial Alcohol Screen: US AUDIT-C     1. How often do you have a drink containing alcohol? 0 Filed at: 04/25/2025 0950   2. How many drinks containing alcohol do you have on a typical day you are drinking?  0 Filed at: 04/25/2025 0950   3a. Male UNDER 65: How often do you have five or more drinks on one occasion? 0 Filed at: 04/25/2025 0950   3b. FEMALE Any Age, or MALE 65+: How often do you have 4 or more drinks on one occassion? 0 Filed at: 04/25/2025 0950   Audit-C Score 0 Filed at: 04/25/2025 0950   CONSUELO: How many times in the past year have you...    Used an illegal drug or used a prescription medication for non-medical reasons? Never Filed at: 04/25/2025 0950                            History of Present Illness       Chief Complaint   Patient presents with    Shoulder  Pain     Pt arrives w complaints of L sided shoulder pain that started 1.5 weeks ago w SOB that started after she got her mammogram -CP       Past Medical History:   Diagnosis Date    Chronic kidney disease     Colon polyp     Elevated troponin 09/12/2021    Hypertension     Hypertensive urgency 09/12/2021      Past Surgical History:   Procedure Laterality Date    CARDIAC CATHETERIZATION      COLONOSCOPY      HYSTERECTOMY  10/23/2016    Dr. Rayne Pope    OOPHORECTOMY Bilateral     ID CYSTO/URETERO W/LITHOTRIPSY &INDWELL STENT INSRT Left 06/01/2016    Procedure: CYSTOSCOPY; URETEROSCOPY; HOLMIUM LASER LITHOTRIPSY; BASKET STONE EXTRACTION; RETROGRADE PYELOGRAM; STENT PLACEMENT ;  Surgeon: Selvin Goodrich MD;  Location: [facility];  Service: Urology    ID CYSTO/URETERO W/LITHOTRIPSY &INDWELL STENT INSRT Left 04/22/2016    Procedure: CYSTOSCOPY, INSERTION STENT URETERAL;  Surgeon: Selvin Goodrich MD;  Location: [facility];  Service: Urology    TUBAL LIGATION        Family History   Problem Relation Age of Onset    No Known Problems Mother     No Known Problems Father     No Known Problems Sister     No Known Problems Maternal Grandmother     No Known Problems Maternal Grandfather     No Known Problems Paternal Grandmother     No Known Problems Paternal Grandfather     No Known Problems Maternal Aunt     No Known Problems Maternal Aunt     No Known Problems Maternal Aunt     No Known Problems Maternal Aunt     No Known Problems Paternal Aunt     No Known Problems Son     No Known Problems Son     No Known Problems Son       Social History     Tobacco Use    Smoking status: Never    Smokeless tobacco: Never   Vaping Use    Vaping status: Never Used   Substance Use Topics    Alcohol use: No    Drug use: No      E-Cigarette/Vaping    E-Cigarette Use Never User       E-Cigarette/Vaping Substances    Nicotine No     THC No     CBD No     Flavoring No     Other No     Unknown No       I have reviewed and agree with the  history as documented.       History provided by:  Patient  Arm Pain  Location:  Left upper arm  Quality:  Ache  Severity:  Moderate  Onset quality:  Gradual  Duration:  2 weeks  Timing:  Constant  Progression:  Waxing and waning  Chronicity:  New  Context:  See MDM  Associated symptoms: no abdominal pain, no chest pain, no congestion, no cough, no diarrhea, no ear pain, no fatigue, no fever, no headaches, no loss of consciousness, no myalgias, no nausea, no rash, no rhinorrhea, no shortness of breath, no sore throat, no vomiting and no wheezing        Review of Systems   Constitutional:  Positive for activity change. Negative for appetite change, chills, diaphoresis, fatigue and fever.   HENT:  Negative for congestion, dental problem, ear discharge, ear pain, postnasal drip, rhinorrhea and sore throat.    Eyes:  Negative for discharge and redness.   Respiratory:  Negative for cough, chest tightness, shortness of breath and wheezing.    Cardiovascular:  Negative for chest pain.   Gastrointestinal:  Negative for abdominal pain, anal bleeding, blood in stool, diarrhea, nausea and vomiting.   Genitourinary:  Negative for dysuria, frequency, hematuria and urgency.   Musculoskeletal:  Positive for arthralgias. Negative for gait problem and myalgias.   Skin:  Negative for color change and rash.   Neurological:  Negative for loss of consciousness and headaches.   Psychiatric/Behavioral:  Negative for confusion.    All other systems reviewed and are negative.          Objective       ED Triage Vitals   Temperature Pulse Blood Pressure Respirations SpO2 Patient Position - Orthostatic VS   04/25/25 0950 04/25/25 0948 04/25/25 0950 04/25/25 0948 04/25/25 0948 04/25/25 0948   98.1 °F (36.7 °C) 79 (!) 223/102 18 98 % Lying      Temp Source Heart Rate Source BP Location FiO2 (%) Pain Score    04/25/25 0948 04/25/25 0948 04/25/25 0948 -- 04/25/25 0948    Oral Monitor Right arm  6      Vitals      Date and Time Temp Pulse SpO2 Resp  BP Pain Score FACES Pain Rating User   04/25/25 1228 -- 56 98 % 18 144/71 -- -- [facility]   04/25/25 1130 -- 58 97 % 19 133/64 -- -- [facility]   04/25/25 1000 -- 50 97 % -- 196/90 -- -- [facility]   04/25/25 0950 98.1 °F (36.7 °C) -- -- -- 223/102 -- -- MM   04/25/25 0948 -- 79 98 % 18 -- 6 -- MM            Physical Exam  Vitals and nursing note reviewed.   Constitutional:       General: She is not in acute distress.     Appearance: Normal appearance. She is normal weight. She is not ill-appearing or toxic-appearing.   HENT:      Head: Normocephalic and atraumatic.      Nose: Nose normal.   Eyes:      Conjunctiva/sclera: Conjunctivae normal.   Cardiovascular:      Rate and Rhythm: Normal rate and regular rhythm.      Heart sounds: Normal heart sounds.   Pulmonary:      Effort: Pulmonary effort is normal.      Breath sounds: Normal breath sounds.   Abdominal:      General: There is no distension.      Palpations: Abdomen is soft.      Tenderness: There is no abdominal tenderness. There is no guarding or rebound.   Musculoskeletal:      Cervical back: Neck supple. No rigidity or tenderness.      Right lower leg: No edema.      Left lower leg: No edema.   Lymphadenopathy:      Cervical: No cervical adenopathy.   Skin:     General: Skin is warm.      Capillary Refill: Capillary refill takes less than 2 seconds.   Neurological:      General: No focal deficit present.      Mental Status: She is alert and oriented to person, place, and time.   Psychiatric:         Mood and Affect: Mood normal.         Behavior: Behavior normal.         Thought Content: Thought content normal.         Judgment: Judgment normal.         Results Reviewed       Procedure Component Value Units Date/Time    HS Troponin I 2hr [[id number]]  (Normal) Collected: 04/25/25 1229    Lab Status: Final result Specimen: Blood from Arm, Right Updated: 04/25/25 1305     hs TnI 2hr 12 ng/L      Delta 2hr hsTnI 1 ng/L     Basic metabolic panel [[id number]]  (Abnormal) Collected:  04/25/25 1104    Lab Status: Final result Specimen: Blood from Arm, Right Updated: 04/25/25 1143     Sodium 141 mmol/L      Potassium 4.0 mmol/L      Chloride 109 mmol/L      CO2 27 mmol/L      ANION GAP 5 mmol/L      BUN 16 mg/dL      Creatinine 0.79 mg/dL      Glucose 120 mg/dL      Calcium 9.5 mg/dL      eGFR 75 ml/min/1.73sq m     Narrative:      [employer] guidelines for Chronic Kidney Disease (CKD):     Stage 1 with normal or high GFR (GFR > 90 mL/min/1.73 square meters)    Stage 2 Mild CKD (GFR = 60-89 mL/min/1.73 square meters)    Stage 3A Moderate CKD (GFR = 45-59 mL/min/1.73 square meters)    Stage 3B Moderate CKD (GFR = 30-44 mL/min/1.73 square meters)    Stage 4 Severe CKD (GFR = 15-29 mL/min/1.73 square meters)    Stage 5 End Stage CKD (GFR <15 mL/min/1.73 square meters)  Note: GFR calculation is accurate only with a steady state creatinine    Magnesium [[id number]]  (Normal) Collected: 04/25/25 1104    Lab Status: Final result Specimen: Blood from Arm, Right Updated: 04/25/25 1143     Magnesium 2.2 mg/dL     HS Troponin 0hr (reflex protocol) [[id number]]  (Normal) Collected: 04/25/25 1104    Lab Status: Final result Specimen: Blood from Arm, Right Updated: 04/25/25 1143     hs TnI 0hr 11 ng/L     Protime-INR [[id number]]  (Normal) Collected: 04/25/25 1104    Lab Status: Final result Specimen: Blood from Arm, Right Updated: 04/25/25 1125     Protime 13.7 seconds      INR 0.98    Narrative:      INR Therapeutic Range    Indication                                             INR Range      Atrial Fibrillation                                               2.0-3.0  Hypercoagulable State                                    2.0.2.3  Left Ventricular Asist Device                            2.0-3.0  Mechanical Heart Valve                                  -    Aortic(with afib, MI, embolism, HF, LA enlargement,    and/or coagulopathy)                                     2.0-3.0 (2.5-3.5)      Mitral                                                             2.5-3.5  Prosthetic/Bioprosthetic Heart Valve               2.0-3.0  Venous thromboembolism (VTE: VT, PE        2.0-3.0    APTT [[id number]]  (Normal) Collected: 04/25/25 1104    Lab Status: Final result Specimen: Blood from Arm, Right Updated: 04/25/25 1125     PTT 26 seconds     CBC and differential [[id number]] Collected: 04/25/25 1104    Lab Status: Final result Specimen: Blood from Arm, Right Updated: 04/25/25 1112     WBC 5.91 Thousand/uL      RBC 4.56 Million/uL      Hemoglobin 13.2 g/dL      Hematocrit 40.5 %      MCV 89 fL      MCH 28.9 pg      MCHC 32.6 g/dL      RDW 13.0 %      MPV 10.4 fL      Platelets 210 Thousands/uL      nRBC 0 /100 WBCs      Segmented % 53 %      Immature Grans % 0 %      Lymphocytes % 33 %      Monocytes % 10 %      Eosinophils Relative 3 %      Basophils Relative 1 %      Absolute Neutrophils 3.14 Thousands/µL      Absolute Immature Grans 0.01 Thousand/uL      Absolute Lymphocytes 1.92 Thousands/µL      Absolute Monocytes 0.60 Thousand/µL      Eosinophils Absolute 0.18 Thousand/µL      Basophils Absolute 0.06 Thousands/µL             XR chest 2 views   ED Interpretation by Julie Lynn Gutzweiler, PA-C (04/25 1138)   No acute cardiopulmonary disease      Final Interpretation by Marcelo Dangelo MD (04/25 0279)      No acute cardiopulmonary disease.               Resident: Usman Byers I, the attending radiologist, have reviewed the images and agree with the final report above.      Workstation performed: [id number]         XR humerus LEFT   ED Interpretation by Julie Lynn Gutzweiler, PA-C (04/25 1140)   No acute bony abnormality.      Final Interpretation by Mahesh Zuniga MD (04/25 1324)      No acute osseous abnormality.         Computerized Assisted Algorithm (CAA) may have been used to analyze all applicable images.         Workstation performed: [id number]         XR spine cervical  complete 4 or 5 vw non injury   ED Interpretation by Julie Lynn Gutzweiler, PA-C (04/25 6885)   Degenerative disc disease C5-6 with anterior spurs and lipping.      Final Interpretation by David Watson MD (04/25 6828)      No acute osseous abnormality.      Degenerative changes as described.         Workstation performed: [id number]             ECG 12 Lead Documentation Only    Date/Time: 4/25/2025 10:55 AM    Performed by: Julie Lynn Gutzweiler, PA-C  Authorized by: Julie Lynn Gutzweiler, PA-C    Indications / Diagnosis:  Left arm pain  ECG reviewed by me, the ED Provider: yes    Patient location:  ED  Previous ECG:     Previous ECG:  Compared to current    Comparison ECG info:  9/13/2021    Similarity:  No change    Comparison to cardiac monitor: Yes    Interpretation:     Interpretation: abnormal    Rate:     ECG rate:  54    ECG rate assessment: bradycardic    Rhythm:     Rhythm: sinus bradycardia    Ectopy:     Ectopy: none    QRS:     QRS axis:  Normal    QRS intervals:  Wide  Conduction:     Conduction: abnormal      Abnormal conduction: incomplete RBBB    ST segments:     ST segments:  Abnormal    Depression:  I, aVL, V4, V5 and V6  T waves:     T waves: inverted    Comments:      ST depression in the lateral leads with T wave inversion are concerning for lateral ischemia.  This is unchanged from the previous EKG from September 13, 2021    No acute signs of ischemia     Independently interpreted by me      ED Medication and Procedure Management   Prior to Admission Medications   Prescriptions Last Dose Informant Patient Reported? Taking?   Cholecalciferol 50 MCG (2000 UT) CAPS  Self Yes No   Sig: Take 1 capsule by mouth daily   aspirin 81 mg chewable tablet 4/24/2025 Morning  No Yes   Sig: Chew 1 tablet (81 mg total) daily   ferrous sulfate 325 (65 Fe) mg tablet 4/24/2025 Evening Self Yes Yes   Sig: Take 325 mg by mouth   metFORMIN (GLUCOPHAGE) 500 mg tablet   Yes No   Sig: Take 1 tablet by mouth in the  morning and 1 tablet in the evening. Take with meals.   metoprolol tartrate (LOPRESSOR) 25 mg tablet 4/24/2025 Self No Yes   Sig: TAKE 1 TABLET (25 MG TOTAL) BY MOUTH EVERY 12 (TWELVE) HOURS   nitroglycerin (NITROSTAT) 0.4 mg SL tablet More than a month Self No No   Sig: Place 1 tablet (0.4 mg total) under the tongue every 5 (five) minutes as needed for chest pain   polyethylene glycol (GOLYTELY) 4000 mL solution   No No   Sig: Take 4,000 mL by mouth once for 1 dose   rosuvastatin (CRESTOR) 40 MG tablet 4/24/2025 Evening Self No Yes   Sig: TAKE 1 TABLET BY MOUTH EVERY DAY   ticagrelor (BRILINTA) 90 MG  Self No No   Sig: Take 1 tablet (90 mg total) by mouth every 12 (twelve) hours   Patient not taking: Reported on 11/1/2022      Facility-Administered Medications: None     Discharge Medication List as of 4/25/2025  1:55 PM        CONTINUE these medications which have NOT CHANGED    Details   aspirin 81 mg chewable tablet Chew 1 tablet (81 mg total) daily, Starting Tue 9/14/2021, Until Fri 4/25/2025, Normal      ferrous sulfate 325 (65 Fe) mg tablet Take 325 mg by mouth, Historical Med      metoprolol tartrate (LOPRESSOR) 25 mg tablet TAKE 1 TABLET (25 MG TOTAL) BY MOUTH EVERY 12 (TWELVE) HOURS, Starting Mon 8/12/2024, Until Thu 8/7/2025, Normal      rosuvastatin (CRESTOR) 40 MG tablet TAKE 1 TABLET BY MOUTH EVERY DAY, Normal      Cholecalciferol 50 MCG (2000 UT) CAPS Take 1 capsule by mouth daily, Historical Med      metFORMIN (GLUCOPHAGE) 500 mg tablet Take 1 tablet by mouth in the morning and 1 tablet in the evening. Take with meals., Starting Thu 2/24/2022, Until Wed 11/27/2024, Historical Med      nitroglycerin (NITROSTAT) 0.4 mg SL tablet Place 1 tablet (0.4 mg total) under the tongue every 5 (five) minutes as needed for chest pain, Starting Fri 7/7/2023, Normal      polyethylene glycol (GOLYTELY) 4000 mL solution Take 4,000 mL by mouth once for 1 dose, Starting Tue 9/3/2024, Normal      ticagrelor (BRILINTA) 90  MG Take 1 tablet (90 mg total) by mouth every 12 (twelve) hours, Starting Mon 9/20/2021, Until Tue 11/1/2022, Normal             ED SEPSIS DOCUMENTATION   Time reflects when diagnosis was documented in both MDM as applicable and the Disposition within this note       Time User Action Codes Description Comment    4/25/2025  1:44 PM Gutzweiler, Julie Add [S46.312A] Triceps strain, left, initial encounter     4/25/2025  1:52 PM Gutzweiler, Julie Add [M54.12] Radiculitis of left cervical region     4/25/2025  1:52 PM Gutzweiler, Julie Add [M50.30] DDD (degenerative disc disease), cervical     4/25/2025  1:52 PM Gutzweiler, Julie Modify [M50.30] DDD (degenerative disc disease), cervical C 5-6    4/25/2025  7:54 PM Gutzweiler, Julie Add [I10] Hypertension                  Julie Lynn Gutzweiler, PA-C  04/25/25 1956

## 2025-05-12 ENCOUNTER — HOSPITAL ENCOUNTER (INPATIENT)
Facility: HOSPITAL | Age: 70
LOS: 1 days | Discharge: HOME | DRG: 661 | End: 2025-05-14
Attending: EMERGENCY MEDICINE | Admitting: STUDENT IN AN ORGANIZED HEALTH CARE EDUCATION/TRAINING PROGRAM
Payer: MEDICARE

## 2025-05-12 ENCOUNTER — APPOINTMENT (EMERGENCY)
Dept: CT IMAGING | Facility: HOSPITAL | Age: 70
DRG: 661 | End: 2025-05-12
Payer: MEDICARE

## 2025-05-12 DIAGNOSIS — R82.71 BACTERIURIA: ICD-10-CM

## 2025-05-12 DIAGNOSIS — N20.1 BILATERAL URETERAL CALCULI: Primary | ICD-10-CM

## 2025-05-12 DIAGNOSIS — N13.2 URETERAL STONE WITH HYDRONEPHROSIS: ICD-10-CM

## 2025-05-12 DIAGNOSIS — N13.30 HYDRONEPHROSIS: ICD-10-CM

## 2025-05-12 PROBLEM — E11.9 TYPE 2 DIABETES MELLITUS WITHOUT COMPLICATION, WITHOUT LONG-TERM CURRENT USE OF INSULIN (HCC): Status: ACTIVE | Noted: 2025-05-12

## 2025-05-12 LAB
ANION GAP SERPL CALCULATED.3IONS-SCNC: 9 MMOL/L (ref 4–13)
BACTERIA UR QL AUTO: ABNORMAL /HPF
BASOPHILS # BLD AUTO: 0.05 THOUSANDS/ÂΜL (ref 0–0.1)
BASOPHILS NFR BLD AUTO: 1 % (ref 0–1)
BILIRUB UR QL STRIP: NEGATIVE
BUN SERPL-MCNC: 13 MG/DL (ref 5–25)
CALCIUM SERPL-MCNC: 10.4 MG/DL (ref 8.4–10.2)
CHLORIDE SERPL-SCNC: 103 MMOL/L (ref 96–108)
CLARITY UR: ABNORMAL
CO2 SERPL-SCNC: 26 MMOL/L (ref 21–32)
COLOR UR: YELLOW
CREAT SERPL-MCNC: 0.93 MG/DL (ref 0.6–1.3)
EOSINOPHIL # BLD AUTO: 0.09 THOUSAND/ÂΜL (ref 0–0.61)
EOSINOPHIL NFR BLD AUTO: 1 % (ref 0–6)
ERYTHROCYTE [DISTWIDTH] IN BLOOD BY AUTOMATED COUNT: 12.5 % (ref 11.6–15.1)
GFR SERPL CREATININE-BSD FRML MDRD: 62 ML/MIN/1.73SQ M
GLUCOSE SERPL-MCNC: 115 MG/DL (ref 65–140)
GLUCOSE SERPL-MCNC: 162 MG/DL (ref 65–140)
GLUCOSE UR STRIP-MCNC: NEGATIVE MG/DL
HCT VFR BLD AUTO: 40.2 % (ref 34.8–46.1)
HGB BLD-MCNC: 13.2 G/DL (ref 11.5–15.4)
HGB UR QL STRIP.AUTO: ABNORMAL
IMM GRANULOCYTES # BLD AUTO: 0.05 THOUSAND/UL (ref 0–0.2)
IMM GRANULOCYTES NFR BLD AUTO: 1 % (ref 0–2)
KETONES UR STRIP-MCNC: NEGATIVE MG/DL
LEUKOCYTE ESTERASE UR QL STRIP: ABNORMAL
LYMPHOCYTES # BLD AUTO: 1.1 THOUSANDS/ÂΜL (ref 0.6–4.47)
LYMPHOCYTES NFR BLD AUTO: 11 % (ref 14–44)
MCH RBC QN AUTO: 28.8 PG (ref 26.8–34.3)
MCHC RBC AUTO-ENTMCNC: 32.8 G/DL (ref 31.4–37.4)
MCV RBC AUTO: 88 FL (ref 82–98)
MONOCYTES # BLD AUTO: 0.89 THOUSAND/ÂΜL (ref 0.17–1.22)
MONOCYTES NFR BLD AUTO: 9 % (ref 4–12)
MUCOUS THREADS UR QL AUTO: ABNORMAL
NEUTROPHILS # BLD AUTO: 7.45 THOUSANDS/ÂΜL (ref 1.85–7.62)
NEUTS SEG NFR BLD AUTO: 77 % (ref 43–75)
NITRITE UR QL STRIP: NEGATIVE
NON-SQ EPI CELLS URNS QL MICRO: ABNORMAL /HPF
NRBC BLD AUTO-RTO: 0 /100 WBCS
PH UR STRIP.AUTO: 6 [PH]
PLATELET # BLD AUTO: 190 THOUSANDS/UL (ref 149–390)
PMV BLD AUTO: 10.1 FL (ref 8.9–12.7)
POTASSIUM SERPL-SCNC: 4.2 MMOL/L (ref 3.5–5.3)
PROT UR STRIP-MCNC: ABNORMAL MG/DL
RBC # BLD AUTO: 4.59 MILLION/UL (ref 3.81–5.12)
RBC #/AREA URNS AUTO: ABNORMAL /HPF
SODIUM SERPL-SCNC: 138 MMOL/L (ref 135–147)
SP GR UR STRIP.AUTO: >=1.03 (ref 1–1.03)
UROBILINOGEN UR QL STRIP.AUTO: 0.2 E.U./DL
WBC # BLD AUTO: 9.63 THOUSAND/UL (ref 4.31–10.16)
WBC #/AREA URNS AUTO: ABNORMAL /HPF

## 2025-05-12 PROCEDURE — 85025 COMPLETE CBC W/AUTO DIFF WBC: CPT | Performed by: EMERGENCY MEDICINE

## 2025-05-12 PROCEDURE — 99222 1ST HOSP IP/OBS MODERATE 55: CPT | Performed by: INTERNAL MEDICINE

## 2025-05-12 PROCEDURE — 96374 THER/PROPH/DIAG INJ IV PUSH: CPT

## 2025-05-12 PROCEDURE — 74176 CT ABD & PELVIS W/O CONTRAST: CPT

## 2025-05-12 PROCEDURE — 99284 EMERGENCY DEPT VISIT MOD MDM: CPT

## 2025-05-12 PROCEDURE — 87086 URINE CULTURE/COLONY COUNT: CPT | Performed by: EMERGENCY MEDICINE

## 2025-05-12 PROCEDURE — 87040 BLOOD CULTURE FOR BACTERIA: CPT | Performed by: INTERNAL MEDICINE

## 2025-05-12 PROCEDURE — 99285 EMERGENCY DEPT VISIT HI MDM: CPT | Performed by: EMERGENCY MEDICINE

## 2025-05-12 PROCEDURE — 36415 COLL VENOUS BLD VENIPUNCTURE: CPT | Performed by: EMERGENCY MEDICINE

## 2025-05-12 PROCEDURE — 82948 REAGENT STRIP/BLOOD GLUCOSE: CPT

## 2025-05-12 PROCEDURE — 80048 BASIC METABOLIC PNL TOTAL CA: CPT | Performed by: EMERGENCY MEDICINE

## 2025-05-12 PROCEDURE — 81001 URINALYSIS AUTO W/SCOPE: CPT | Performed by: EMERGENCY MEDICINE

## 2025-05-12 RX ORDER — INSULIN LISPRO 100 [IU]/ML
1-5 INJECTION, SOLUTION INTRAVENOUS; SUBCUTANEOUS
Status: DISCONTINUED | OUTPATIENT
Start: 2025-05-12 | End: 2025-05-14 | Stop reason: HOSPADM

## 2025-05-12 RX ORDER — LIDOCAINE 50 MG/G
1 PATCH TOPICAL ONCE
Status: COMPLETED | OUTPATIENT
Start: 2025-05-12 | End: 2025-05-13

## 2025-05-12 RX ORDER — KETOROLAC TROMETHAMINE 30 MG/ML
15 INJECTION, SOLUTION INTRAMUSCULAR; INTRAVENOUS ONCE
Status: COMPLETED | OUTPATIENT
Start: 2025-05-12 | End: 2025-05-12

## 2025-05-12 RX ORDER — METOPROLOL TARTRATE 25 MG/1
25 TABLET, FILM COATED ORAL EVERY 12 HOURS SCHEDULED
Status: DISCONTINUED | OUTPATIENT
Start: 2025-05-12 | End: 2025-05-14 | Stop reason: HOSPADM

## 2025-05-12 RX ORDER — ATORVASTATIN CALCIUM 40 MG/1
80 TABLET, FILM COATED ORAL
Status: DISCONTINUED | OUTPATIENT
Start: 2025-05-12 | End: 2025-05-14 | Stop reason: HOSPADM

## 2025-05-12 RX ORDER — ACETAMINOPHEN 325 MG/1
650 TABLET ORAL EVERY 6 HOURS PRN
Status: DISCONTINUED | OUTPATIENT
Start: 2025-05-12 | End: 2025-05-14 | Stop reason: HOSPADM

## 2025-05-12 RX ORDER — CEFTRIAXONE 1 G/50ML
1000 INJECTION, SOLUTION INTRAVENOUS ONCE
Status: COMPLETED | OUTPATIENT
Start: 2025-05-12 | End: 2025-05-12

## 2025-05-12 RX ORDER — ASPIRIN 81 MG/1
81 TABLET, CHEWABLE ORAL DAILY
Status: DISCONTINUED | OUTPATIENT
Start: 2025-05-13 | End: 2025-05-14 | Stop reason: HOSPADM

## 2025-05-12 RX ORDER — CEFTRIAXONE 1 G/50ML
1000 INJECTION, SOLUTION INTRAVENOUS EVERY 24 HOURS
Status: DISCONTINUED | OUTPATIENT
Start: 2025-05-13 | End: 2025-05-14

## 2025-05-12 RX ADMIN — CEFTRIAXONE 1000 MG: 1 INJECTION, SOLUTION INTRAVENOUS at 18:29

## 2025-05-12 RX ADMIN — METOPROLOL TARTRATE 25 MG: 25 TABLET, FILM COATED ORAL at 20:53

## 2025-05-12 RX ADMIN — LIDOCAINE 1 PATCH: 700 PATCH TOPICAL at 15:10

## 2025-05-12 RX ADMIN — ATORVASTATIN CALCIUM 80 MG: 40 TABLET, FILM COATED ORAL at 20:53

## 2025-05-12 RX ADMIN — INSULIN LISPRO 1 UNITS: 100 INJECTION, SOLUTION INTRAVENOUS; SUBCUTANEOUS at 21:03

## 2025-05-12 RX ADMIN — KETOROLAC TROMETHAMINE 15 MG: 30 INJECTION, SOLUTION INTRAMUSCULAR at 15:16

## 2025-05-12 NOTE — ASSESSMENT & PLAN NOTE
Status post PCI in 2021.  Continue aspirin and statin.  Patient not on Brilinta anymore   Composite Graft Text: The defect edges were debeveled with a #15 scalpel blade. Given the location of the defect, shape of the defect, the proximity to free margins and the fact the defect was full thickness a composite graft was deemed most appropriate.  The defect was outline and then transferred to the donor site.  A full thickness graft was then excised from the donor site. The graft was then placed in the primary defect, oriented appropriately and then sutured into place.  The secondary defect was then repaired using a primary closure.

## 2025-05-12 NOTE — H&P
"H&P - Hospitalist   Name: Osito Carlson 70 y.o. female I MRN: [id number]  Unit/Bed#: ED OVR 22 I Date of Admission: 5/12/2025   Date of Service: 5/12/2025 I Hospital Day: 0     Assessment & Plan  Bilateral Ureteral stone with hydronephrosis  CT: Bilateral proximal ureteral calculi measuring 5 mm cause moderate bilateral hydroureteronephrosis, as well as right perinephric fat stranding.   Patient without any signs or symptoms of infection.  Creatinine stable.    ED discussed the case with urology.  Plan for bilateral stent placement tomorrow.  Patient to be started on ceftriaxone empirically per urology recommendations.  N.p.o. after midnight.  Coronary artery disease involving native coronary artery of native heart without angina pectoris  Status post PCI in 2021.  Continue aspirin and statin.  Patient not on Brilinta anymore  Type 2 diabetes mellitus without complication, without long-term current use of insulin (Formerly [facility])  Lab Results   Component Value Date    HGBA1C 6.7 (H) 05/05/2025       No results for input(s): \"POCGLU\" in the last 72 hours.    Blood Sugar Average: Last 72 hrs:    Patient on metformin at home.    Hold metformin for now.  Use sliding scale insulin instead.      VTE Pharmacologic Prophylaxis:   Low Risk (Score 0-2) - Encourage Ambulation.  Code Status:Full code  Discussion with family: Patient declined call to .     Anticipated Length of Stay: Patient will be admitted on an observation basis with an anticipated length of stay of less than 2 midnights secondary to obstructing ureteral stone, stent placement tomorrow.    History of Present Illness   Chief Complaint: Right flank pain    Osito Carlson is a 70 y.o. female with a PMH as below who presents with right flank pain that started earlier today.  No fever or chills.  No nausea or vomiting.    CT in ED showed bilateral obstructing ureteral stone.    Review of Systems   Constitutional:  Negative for chills and fever.   HENT:  " Negative for congestion and sore throat.    Respiratory:  Negative for cough and shortness of breath.    Cardiovascular:  Negative for chest pain and palpitations.   Gastrointestinal:  Negative for abdominal pain, nausea and vomiting.   Genitourinary:  Positive for flank pain. Negative for dysuria, hematuria and urgency.   Musculoskeletal:  Negative for arthralgias and myalgias.   Skin:  Negative for color change and rash.   Neurological:  Negative for dizziness and light-headedness.   Psychiatric/Behavioral:  Negative for agitation, behavioral problems and confusion.        Historical Information   Past Medical History:   Diagnosis Date    Chronic kidney disease     Colon polyp     Elevated troponin 09/12/2021    Hypertension     Hypertensive urgency 09/12/2021     Past Surgical History:   Procedure Laterality Date    CARDIAC CATHETERIZATION      COLONOSCOPY      HYSTERECTOMY  10/23/2016    Dr. Rayne Pope    OOPHORECTOMY Bilateral     AR CYSTO/URETERO W/LITHOTRIPSY &INDWELL STENT INSRT Left 06/01/2016    Procedure: CYSTOSCOPY; URETEROSCOPY; HOLMIUM LASER LITHOTRIPSY; BASKET STONE EXTRACTION; RETROGRADE PYELOGRAM; STENT PLACEMENT ;  Surgeon: Selvin Goodrich MD;  [facility];  Service: Urology    AR CYSTO/URETERO W/LITHOTRIPSY &INDWELL STENT INSRT Left 04/22/2016    Procedure: CYSTOSCOPY, INSERTION STENT URETERAL;  Surgeon: Selvin Goodrich MD;  [facility];  Service: Urology    TUBAL LIGATION       Social History     Tobacco Use    Smoking status: Never    Smokeless tobacco: Never   Vaping Use    Vaping status: Never Used   Substance and Sexual Activity    Alcohol use: No    Drug use: No    Sexual activity: Not on file     E-Cigarette/Vaping    E-Cigarette Use Never User      E-Cigarette/Vaping Substances    Nicotine No     THC No     CBD No     Flavoring No     Other No     Unknown No      Family History   Problem Relation Age of Onset    No Known Problems Mother     No Known Problems Father      No Known Problems Sister     No Known Problems Maternal Grandmother     No Known Problems Maternal Grandfather     No Known Problems Paternal Grandmother     No Known Problems Paternal Grandfather     No Known Problems Maternal Aunt     No Known Problems Maternal Aunt     No Known Problems Maternal Aunt     No Known Problems Maternal Aunt     No Known Problems Paternal Aunt     No Known Problems Son     No Known Problems Son     No Known Problems Son      Social History:  Marital Status: /Civil Union   Occupation:   Patient Pre-hospital Living Situation:   Patient Pre-hospital Level of Mobility:   Patient Pre-hospital Diet Restrictions:     Meds/Allergies   I have reviewed home medications with patient personally.  Prior to Admission medications    Medication Sig Start Date End Date Taking? Authorizing Provider   aspirin 81 mg chewable tablet Chew 1 tablet (81 mg total) daily 9/14/21 4/25/25  Lani Almazan DO   Cholecalciferol 50 MCG (2000 UT) CAPS Take 1 capsule by mouth daily    Historical Provider, MD   ferrous sulfate 325 (65 Fe) mg tablet Take 325 mg by mouth    Historical Provider, MD   metFORMIN (GLUCOPHAGE) 500 mg tablet Take 1 tablet by mouth in the morning and 1 tablet in the evening. Take with meals. 2/24/22 11/27/24  Historical Provider, MD   metoprolol tartrate (LOPRESSOR) 25 mg tablet TAKE 1 TABLET (25 MG TOTAL) BY MOUTH EVERY 12 (TWELVE) HOURS 8/12/24 8/7/25  Kelley Duarte DO   nitroglycerin (NITROSTAT) 0.4 mg SL tablet Place 1 tablet (0.4 mg total) under the tongue every 5 (five) minutes as needed for chest pain 7/7/23   Kelley Duarte DO   polyethylene glycol (GOLYTELY) 4000 mL solution Take 4,000 mL by mouth once for 1 dose 9/3/24 9/3/24  DANIELLE Boggs   rosuvastatin (CRESTOR) 40 MG tablet TAKE 1 TABLET BY MOUTH EVERY DAY 11/14/22   Kelley Duarte DO   ticagrelor (BRILINTA) 90 MG Take 1 tablet (90 mg total) by mouth every 12 (twelve) hours  Patient not taking: Reported on 11/1/2022  9/20/21 11/1/22  DANIELLE Morales   atorvastatin (LIPITOR) 40 mg tablet Take 1 tablet (40 mg total) by mouth daily with dinner 9/20/21 11/17/21  DANIELLE Morales     No Known Allergies    Objective :  Temp:  [97.6 °F (36.4 °C)] 97.6 °F (36.4 °C)  HR:  [59-80] 59  BP: (171)/(79) 171/79  Resp:  [16-20] 16  SpO2:  [98 %-100 %] 98 %  O2 Device: None (Room air)    Physical Exam     Constitutional: Pt appears comfortable. Not in any acute distress.  HENT:   Head: Normocephalic and atraumatic.   Eyes: EOM are normal.   Neck: Neck supple.   Cardiovascular: Normal rate, regular rhythm, normal heart sounds.  No murmur heard.  Pulmonary/Chest: Effort normal, air entry b/l equal. No respiratory distress. Pt has no wheezes or crackles.   Abdominal: Soft. Non-distended, Non-tender. Bowel sounds are normal.   Musculoskeletal: Normal range of motion.   Neurological: awake, alert. Moving all extremities spontaneously.  Psychiatric: normal mood and affect.      Lines/Drains:            Lab Results: I have reviewed the following results:  Results from last 7 days   Lab Units 05/12/25  1516   WBC Thousand/uL 9.63   HEMOGLOBIN g/dL 13.2   HEMATOCRIT % 40.2   PLATELETS Thousands/uL 190   SEGS PCT % 77*   LYMPHO PCT % 11*   MONO PCT % 9   EOS PCT % 1     Results from last 7 days   Lab Units 05/12/25  1516   SODIUM mmol/L 138   POTASSIUM mmol/L 4.2   CHLORIDE mmol/L 103   CO2 mmol/L 26   BUN mg/dL 13   CREATININE mg/dL 0.93   ANION GAP mmol/L 9   CALCIUM mg/dL 10.4*   GLUCOSE RANDOM mg/dL 115             Lab Results   Component Value Date    HGBA1C 6.7 (H) 05/05/2025    HGBA1C 6.6 (H) 11/20/2024    HGBA1C 6.8 (H) 05/01/2024               Administrative Statements       ** Please Note: This note has been constructed using a voice recognition system. **

## 2025-05-12 NOTE — ED PROVIDER NOTES
Time reflects when diagnosis was documented in both MDM as applicable and the Disposition within this note       Time User Action Codes Description Comment    5/12/2025  5:21 PM Cristel Hurley Add [N20.1] Bilateral ureteral calculi     5/12/2025  5:29 PM Hollis Salmeron Add [R82.71] Bacteriuria     5/12/2025  5:29 PM Hollis Salmeron Add [N13.30] Hydronephrosis           ED Disposition       ED Disposition   Admit    Condition   Stable    Date/Time   Mon May 12, 2025  5:29 PM    Comment   Case was discussed with mirlande and the patient's admission status was agreed to be Admission Status: observation status to the service of Dr. Becerra .               Assessment & Plan       Medical Decision Making  Patient with right flank pain.  No relieving or exacerbating factors.  Not colicky.  No hematuria.  States that it feels like a deep pain.  Likely favors muscular source of pain, however with the patient stating that feels like deep pain in it not pain worse with movement or palpation will obtain CT abdomen pelvis without contrast to rule out ureterolithiasis.  Will obtain urine to evaluate for Pyelo though low suspicion.    Differential including but not limited to sprain, strain, pyelonephritis, ureterolithiasis    CT scan with bilateral ureteral stones.  5 mm in the right.  9 mm on the left per my interpretation of the CT scan.  Moderate hydronephrosis per radiology.  Right-sided perinephric stranding.  Urine with significant bacteria and leuks.    No significant suspicion of UTI but with bilateral ureteral calculi likely needs intervention.  I spoke with urology who recommended admission for stenting tomorrow morning.  They recommended ceftriaxone.    Will admit to medicine.    Problems Addressed:  Bacteriuria: acute illness or injury  Bilateral ureteral calculi: acute illness or injury  Hydronephrosis: acute illness or injury    Amount and/or Complexity of Data Reviewed  Labs: ordered. Decision-making details  documented in ED Course.  Radiology: ordered.    Risk  Prescription drug management.  Decision regarding hospitalization.        ED Course as of 05/12/25 1830   Mon May 12, 2025   1622 Leukocytes, UA(!): Trace   1632 Txt to urology ([facility])       Medications   lidocaine (LIDODERM) 5 % patch 1 patch (1 patch Topical Medication Applied 5/12/25 1510)   cefTRIAXone (ROCEPHIN) IVPB (premix in dextrose) 1,000 mg 50 mL (1,000 mg Intravenous New Bag 5/12/25 1829)   cefTRIAXone (ROCEPHIN) IVPB (premix in dextrose) 1,000 mg 50 mL (has no administration in time range)   insulin lispro (HumALOG/ADMELOG) 100 units/mL subcutaneous injection 1-5 Units (has no administration in time range)   ketorolac (TORADOL) injection 15 mg (15 mg Intravenous Given 5/12/25 1516)       ED Risk Strat Scores                    No data recorded                            History of Present Illness       Chief Complaint   Patient presents with    Back Pain     Pt reports woke up with right lower back pain, denies trauma; hx of kidney stone;        Past Medical History:   Diagnosis Date    Chronic kidney disease     Colon polyp     Elevated troponin 09/12/2021    Hypertension     Hypertensive urgency 09/12/2021      Past Surgical History:   Procedure Laterality Date    CARDIAC CATHETERIZATION      COLONOSCOPY      HYSTERECTOMY  10/23/2016    Dr. Rayne Pope    OOPHORECTOMY Bilateral     [place] CYSTO/URETERO W/LITHOTRIPSY &INDWELL STENT INSRT Left 06/01/2016    Procedure: CYSTOSCOPY; URETEROSCOPY; HOLMIUM LASER LITHOTRIPSY; BASKET STONE EXTRACTION; RETROGRADE PYELOGRAM; STENT PLACEMENT ;  Surgeon: Selvin Goodrich MD;  [facility];  Service: Urology    AZ CYSTO/URETERO W/LITHOTRIPSY &INDWELL STENT INSRT Left 04/22/2016    Procedure: CYSTOSCOPY, INSERTION STENT URETERAL;  Surgeon: Selvin Goodrich MD;  [facility];  Service: Urology    TUBAL LIGATION        Family History   Problem Relation Age of Onset    No Known Problems Mother     No  Known Problems Father     No Known Problems Sister     No Known Problems Maternal Grandmother     No Known Problems Maternal Grandfather     No Known Problems Paternal Grandmother     No Known Problems Paternal Grandfather     No Known Problems Maternal Aunt     No Known Problems Maternal Aunt     No Known Problems Maternal Aunt     No Known Problems Maternal Aunt     No Known Problems Paternal Aunt     No Known Problems Son     No Known Problems Son     No Known Problems Son       Social History     Tobacco Use    Smoking status: Never    Smokeless tobacco: Never   Vaping Use    Vaping status: Never Used   Substance Use Topics    Alcohol use: No    Drug use: No      E-Cigarette/Vaping    E-Cigarette Use Never User       E-Cigarette/Vaping Substances    Nicotine No     THC No     CBD No     Flavoring No     Other No     Unknown No       I have reviewed and agree with the history as documented.     70-year-old female history of kidney stones presents to the emergency department for right sided flank pain.  No alleviating or exacerbating symptoms.  Constantly there.  Can think of nothing that caused it, though she notes she is being evaluated by an orthopedist or primary care doctor for left elbow pain and thinks she might be sleeping differently than normal secondary to the pain.    She taken nothing for symptoms today.    Denies dysuria, hematuria, increased frequency of urination.    Notes nausea without emesis.    No fevers.      Back Pain  Pain location: Right flank.  Quality:  Aching  Radiates to:  Does not radiate  Pain severity:  Mild  Pain is:  Same all the time  Onset quality:  Gradual  Timing:  Constant  Progression:  Unchanged  Chronicity:  New      Review of Systems   Gastrointestinal:  Positive for nausea.   Musculoskeletal:  Positive for back pain.   All other systems reviewed and are negative.          Objective       ED Triage Vitals   Temperature Pulse Blood Pressure Respirations SpO2 Patient  Position - Orthostatic VS   05/12/25 1429 05/12/25 1436 05/12/25 1429 05/12/25 1429 05/12/25 1436 05/12/25 1429   97.6 °F (36.4 °C) 80 (!) 171/79 20 100 % Lying      Temp Source Heart Rate Source BP Location FiO2 (%) Pain Score    05/12/25 1429 05/12/25 1429 05/12/25 1429 -- 05/12/25 1516    Oral Monitor Right arm  7      Vitals      Date and Time Temp Pulse SpO2 Resp BP Pain Score FACES Pain Rating User   05/12/25 1730 -- 59 98 % 16 -- -- -- JLT   05/12/25 1516 -- -- -- -- -- 7 -- JLT   05/12/25 1436 -- 80 100 % -- -- -- -- ALG   05/12/25 1429 97.6 °F (36.4 °C) -- -- 20 171/79 -- -- ALG            Physical Exam  Vitals and nursing note reviewed.   Constitutional:       General: She is not in acute distress.     Appearance: Normal appearance. She is not ill-appearing.   HENT:      Head: Normocephalic and atraumatic.      Right Ear: External ear normal.      Left Ear: External ear normal.      Nose: Nose normal.      Mouth/Throat:      Mouth: Mucous membranes are moist.   Eyes:      General:         Right eye: No discharge.         Left eye: No discharge.      Conjunctiva/sclera: Conjunctivae normal.   Cardiovascular:      Rate and Rhythm: Normal rate and regular rhythm.      Pulses: Normal pulses.      Heart sounds: No murmur heard.  Pulmonary:      Effort: Pulmonary effort is normal.      Breath sounds: Normal breath sounds.   Abdominal:      General: Abdomen is flat. There is no distension.      Tenderness: There is no abdominal tenderness.   Musculoskeletal:         General: Tenderness present. Normal range of motion.      Cervical back: Normal range of motion.      Comments: Minimal pain on palpation in the right flank.  No pain on palpation of the central spine.   Skin:     General: Skin is warm.      Capillary Refill: Capillary refill takes less than 2 seconds.      Findings: No rash.   Neurological:      General: No focal deficit present.      Mental Status: She is alert. Mental status is at baseline.    Psychiatric:         Mood and Affect: Mood normal.         Behavior: Behavior normal.         Results Reviewed       Procedure Component Value Units Date/Time    Blood culture [[id number]] Collected: 05/12/25 1825    Lab Status: In process Specimen: Blood from Arm, Left Updated: 05/12/25 1830    Blood culture [[id number]] Collected: 05/12/25 1825    Lab Status: In process Specimen: Blood from Hand, Right Updated: 05/12/25 1830    Urine Microscopic [[id number]]  (Abnormal) Collected: 05/12/25 1610    Lab Status: Final result Specimen: Urine, Clean Catch Updated: 05/12/25 1629     RBC, UA Innumerable /hpf      WBC, UA Innumerable /hpf      Epithelial Cells Moderate /hpf      Bacteria, UA Innumerable /hpf      MUCUS THREADS Innumerable    Urine culture [[id number]] Collected: 05/12/25 1610    Lab Status: In process Specimen: Urine, Clean Catch Updated: 05/12/25 1629    UA w Reflex to Microscopic w Reflex to Culture [[id number]]  (Abnormal) Collected: 05/12/25 1610    Lab Status: Final result Specimen: Urine, Clean Catch Updated: 05/12/25 1615     Color, UA Yellow     Clarity, UA Cloudy     Specific Gravity, UA >=1.030     pH, UA 6.0     Leukocytes, UA Trace     Nitrite, UA Negative     Protein, UA 2+ mg/dl      Glucose, UA Negative mg/dl      Ketones, UA Negative mg/dl      Urobilinogen, UA 0.2 E.U./dl      Bilirubin, UA Negative     Occult Blood, UA 3+    Basic metabolic panel [[id number]]  (Abnormal) Collected: 05/12/25 1516    Lab Status: Final result Specimen: Blood from Arm, Right Updated: 05/12/25 1600     Sodium 138 mmol/L      Potassium 4.2 mmol/L      Chloride 103 mmol/L      CO2 26 mmol/L      ANION GAP 9 mmol/L      BUN 13 mg/dL      Creatinine 0.93 mg/dL      Glucose 115 mg/dL      Calcium 10.4 mg/dL      eGFR 62 ml/min/1.73sq m     Narrative:      National Kid[employer] Disease Foundation guidelines for Chronic Kidney Disease (CKD):     Stage 1 with normal or high GFR (GFR > 90 mL/min/1.73 square meters)    Stage 2  Mild CKD (GFR = 60-89 mL/min/1.73 square meters)    Stage 3A Moderate CKD (GFR = 45-59 mL/min/1.73 square meters)    Stage 3B Moderate CKD (GFR = 30-44 mL/min/1.73 square meters)    Stage 4 Severe CKD (GFR = 15-29 mL/min/1.73 square meters)    Stage 5 End Stage CKD (GFR <15 mL/min/1.73 square meters)  Note: GFR calculation is accurate only with a steady state creatinine    CBC and differential [[id number]]  (Abnormal) Collected: 05/12/25 1516    Lab Status: Final result Specimen: Blood from Arm, Right Updated: 05/12/25 1523     WBC 9.63 Thousand/uL      RBC 4.59 Million/uL      Hemoglobin 13.2 g/dL      Hematocrit 40.2 %      MCV 88 fL      MCH 28.8 pg      MCHC 32.8 g/dL      RDW 12.5 %      MPV 10.1 fL      Platelets 190 Thousands/uL      nRBC 0 /100 WBCs      Segmented % 77 %      Immature Grans % 1 %      Lymphocytes % 11 %      Monocytes % 9 %      Eosinophils Relative 1 %      Basophils Relative 1 %      Absolute Neutrophils 7.45 Thousands/µL      Absolute Immature Grans 0.05 Thousand/uL      Absolute Lymphocytes 1.10 Thousands/µL      Absolute Monocytes 0.89 Thousand/µL      Eosinophils Absolute 0.09 Thousand/µL      Basophils Absolute 0.05 Thousands/µL             CT abdomen pelvis wo contrast   Final Interpretation by Wood Padgett MD (05/12 8776)      Bilateral proximal ureteral calculi measuring 5 mm cause moderate bilateral hydroureteronephrosis, as well as right perinephric fat stranding.      Workstation performed: [id number]             Procedures    ED Medication and Procedure Management   Prior to Admission Medications   Prescriptions Last Dose Informant Patient Reported? Taking?   Cholecalciferol 50 MCG (2000 UT) CAPS  Self Yes No   Sig: Take 1 capsule by mouth daily   aspirin 81 mg chewable tablet   No No   Sig: Chew 1 tablet (81 mg total) daily   ferrous sulfate 325 (65 Fe) mg tablet  Self Yes No   Sig: Take 325 mg by mouth   metFORMIN (GLUCOPHAGE) 500 mg tablet   Yes No   Sig: Take 1 tablet by  mouth in the morning and 1 tablet in the evening. Take with meals.   metoprolol tartrate (LOPRESSOR) 25 mg tablet  Self No No   Sig: TAKE 1 TABLET (25 MG TOTAL) BY MOUTH EVERY 12 (TWELVE) HOURS   nitroglycerin (NITROSTAT) 0.4 mg SL tablet  Self No No   Sig: Place 1 tablet (0.4 mg total) under the tongue every 5 (five) minutes as needed for chest pain   polyethylene glycol (GOLYTELY) 4000 mL solution   No No   Sig: Take 4,000 mL by mouth once for 1 dose   rosuvastatin (CRESTOR) 40 MG tablet  Self No No   Sig: TAKE 1 TABLET BY MOUTH EVERY DAY   ticagrelor (BRILINTA) 90 MG  Self No No   Sig: Take 1 tablet (90 mg total) by mouth every 12 (twelve) hours   Patient not taking: Reported on 11/1/2022      Facility-Administered Medications: None     Patient's Medications   Discharge Prescriptions    No medications on file     No discharge procedures on file.  ED SEPSIS DOCUMENTATION   Time reflects when diagnosis was documented in both MDM as applicable and the Disposition within this note       Time User Action Codes Description Comment    5/12/2025  5:21 PM Cristel Hurley Add [N20.1] Bilateral ureteral calculi     5/12/2025  5:29 PM Hollis Salmeron Add [R82.71] Bacteriuria     5/12/2025  5:29 PM Hollis Salmeron Add [N13.30] Hydronephrosis                  Hollis Salmeron, DO  05/12/25 1829       Hollis Salmeron, DO  05/12/25 1830

## 2025-05-12 NOTE — QUICK NOTE
70 female with acute right flank pain for one day found to have bilateral ureteral calculi with obstruction. She will require cystoscopy BILATERAL ureteral stenting for her obstructive uropathy. She is hemodynamically stable and pain is managed. There is cloudy urine and pyuria and microhematuria but without nitrites and without fever.     Expedite urgent decompression (would require transfer to alternate campus) if hemodynamic instability overnight. Otherwise plan for OR on 5/13 AM at [facility] when urologist is on site.    She should be NPO from midnight and receive emipric ceftriaxone now.    Lab Results   Component Value Date    WBC 9.63 05/12/2025     Lab Results   Component Value Date    CREATININE 0.93 05/12/2025     BP (!) 171/79 (BP Location: Right arm)   Pulse 80   Temp 97.6 °F (36.4 °C) (Oral)   Resp 20   SpO2 100%

## 2025-05-12 NOTE — ASSESSMENT & PLAN NOTE
"Lab Results   Component Value Date    HGBA1C 6.7 (H) 05/05/2025       No results for input(s): \"POCGLU\" in the last 72 hours.    Blood Sugar Average: Last 72 hrs:    Patient on metformin at home.    Hold metformin for now.  Use sliding scale insulin instead.  "

## 2025-05-12 NOTE — ASSESSMENT & PLAN NOTE
CT: Bilateral proximal ureteral calculi measuring 5 mm cause moderate bilateral hydroureteronephrosis, as well as right perinephric fat stranding.   Patient without any signs or symptoms of infection.  Creatinine stable.    ED discussed the case with urology.  Plan for bilateral stent placement tomorrow.  Patient to be started on ceftriaxone empirically per urology recommendations.  N.p.o. after midnight.

## 2025-05-13 ENCOUNTER — TELEPHONE (OUTPATIENT)
Dept: UROLOGY | Facility: MEDICAL CENTER | Age: 70
End: 2025-05-13

## 2025-05-13 ENCOUNTER — APPOINTMENT (OUTPATIENT)
Dept: RADIOLOGY | Facility: HOSPITAL | Age: 70
DRG: 661 | End: 2025-05-13
Payer: MEDICARE

## 2025-05-13 ENCOUNTER — ANESTHESIA (OUTPATIENT)
Dept: PERIOP | Facility: HOSPITAL | Age: 70
DRG: 661 | End: 2025-05-13
Payer: MEDICARE

## 2025-05-13 ENCOUNTER — PREP FOR PROCEDURE (OUTPATIENT)
Dept: UROLOGY | Facility: MEDICAL CENTER | Age: 70
End: 2025-05-13

## 2025-05-13 ENCOUNTER — ANESTHESIA EVENT (OUTPATIENT)
Dept: PERIOP | Facility: HOSPITAL | Age: 70
DRG: 661 | End: 2025-05-13
Payer: MEDICARE

## 2025-05-13 DIAGNOSIS — N20.1 URETERAL STONE: Primary | ICD-10-CM

## 2025-05-13 PROBLEM — I21.9 MI (MYOCARDIAL INFARCTION) (HCC): Status: ACTIVE | Noted: 2025-05-13

## 2025-05-13 PROBLEM — R79.89 ELEVATED SERUM CREATININE: Status: ACTIVE | Noted: 2025-05-13

## 2025-05-13 PROBLEM — Z98.61 HISTORY OF PTCA: Status: ACTIVE | Noted: 2025-05-13

## 2025-05-13 PROBLEM — I10 HTN (HYPERTENSION): Status: ACTIVE | Noted: 2025-05-13

## 2025-05-13 LAB
ANION GAP SERPL CALCULATED.3IONS-SCNC: 10 MMOL/L (ref 4–13)
BACTERIA UR CULT: NORMAL
BUN SERPL-MCNC: 17 MG/DL (ref 5–25)
CALCIUM SERPL-MCNC: 9.8 MG/DL (ref 8.4–10.2)
CHLORIDE SERPL-SCNC: 106 MMOL/L (ref 96–108)
CO2 SERPL-SCNC: 25 MMOL/L (ref 21–32)
CREAT SERPL-MCNC: 1.1 MG/DL (ref 0.6–1.3)
ERYTHROCYTE [DISTWIDTH] IN BLOOD BY AUTOMATED COUNT: 12.4 % (ref 11.6–15.1)
GFR SERPL CREATININE-BSD FRML MDRD: 50 ML/MIN/1.73SQ M
GLUCOSE SERPL-MCNC: 107 MG/DL (ref 65–140)
GLUCOSE SERPL-MCNC: 123 MG/DL (ref 65–140)
GLUCOSE SERPL-MCNC: 171 MG/DL (ref 65–140)
GLUCOSE SERPL-MCNC: 244 MG/DL (ref 65–140)
HCT VFR BLD AUTO: 38.3 % (ref 34.8–46.1)
HGB BLD-MCNC: 12.4 G/DL (ref 11.5–15.4)
MCH RBC QN AUTO: 28.6 PG (ref 26.8–34.3)
MCHC RBC AUTO-ENTMCNC: 32.4 G/DL (ref 31.4–37.4)
MCV RBC AUTO: 89 FL (ref 82–98)
PLATELET # BLD AUTO: 194 THOUSANDS/UL (ref 149–390)
PMV BLD AUTO: 10.7 FL (ref 8.9–12.7)
POTASSIUM SERPL-SCNC: 3.8 MMOL/L (ref 3.5–5.3)
RBC # BLD AUTO: 4.33 MILLION/UL (ref 3.81–5.12)
SODIUM SERPL-SCNC: 141 MMOL/L (ref 135–147)
WBC # BLD AUTO: 8.04 THOUSAND/UL (ref 4.31–10.16)

## 2025-05-13 PROCEDURE — 99232 SBSQ HOSP IP/OBS MODERATE 35: CPT | Performed by: PHYSICIAN ASSISTANT

## 2025-05-13 PROCEDURE — 74420 UROGRAPHY RTRGR +-KUB: CPT

## 2025-05-13 PROCEDURE — 82948 REAGENT STRIP/BLOOD GLUCOSE: CPT

## 2025-05-13 PROCEDURE — 80048 BASIC METABOLIC PNL TOTAL CA: CPT | Performed by: INTERNAL MEDICINE

## 2025-05-13 PROCEDURE — 52332 CYSTOSCOPY AND TREATMENT: CPT | Performed by: UROLOGY

## 2025-05-13 PROCEDURE — NC001 PR NO CHARGE: Performed by: UROLOGY

## 2025-05-13 PROCEDURE — 0T788DZ DILATION OF BILATERAL URETERS WITH INTRALUMINAL DEVICE, VIA NATURAL OR ARTIFICIAL OPENING ENDOSCOPIC: ICD-10-PCS | Performed by: UROLOGY

## 2025-05-13 PROCEDURE — 85027 COMPLETE CBC AUTOMATED: CPT | Performed by: INTERNAL MEDICINE

## 2025-05-13 PROCEDURE — BT141ZZ FLUOROSCOPY OF KIDNEYS, URETERS AND BLADDER USING LOW OSMOLAR CONTRAST: ICD-10-PCS | Performed by: UROLOGY

## 2025-05-13 PROCEDURE — C2625 STENT, NON-COR, TEM W/DEL SY: HCPCS | Performed by: UROLOGY

## 2025-05-13 PROCEDURE — C1769 GUIDE WIRE: HCPCS | Performed by: UROLOGY

## 2025-05-13 PROCEDURE — 99223 1ST HOSP IP/OBS HIGH 75: CPT | Performed by: UROLOGY

## 2025-05-13 DEVICE — STENT URETERAL 6FR 22CM INLAY OPTIMA W/NITINOL GDWR: Type: IMPLANTABLE DEVICE | Site: URETER | Status: FUNCTIONAL

## 2025-05-13 RX ORDER — ONDANSETRON 2 MG/ML
4 INJECTION INTRAMUSCULAR; INTRAVENOUS ONCE AS NEEDED
Status: DISCONTINUED | OUTPATIENT
Start: 2025-05-13 | End: 2025-05-13 | Stop reason: HOSPADM

## 2025-05-13 RX ORDER — SODIUM CHLORIDE, SODIUM GLUCONATE, SODIUM ACETATE, POTASSIUM CHLORIDE, MAGNESIUM CHLORIDE, SODIUM PHOSPHATE, DIBASIC, AND POTASSIUM PHOSPHATE .53; .5; .37; .037; .03; .012; .00082 G/100ML; G/100ML; G/100ML; G/100ML; G/100ML; G/100ML; G/100ML
75 INJECTION, SOLUTION INTRAVENOUS CONTINUOUS
Status: DISCONTINUED | OUTPATIENT
Start: 2025-05-13 | End: 2025-05-14

## 2025-05-13 RX ORDER — FENTANYL CITRATE 50 UG/ML
INJECTION, SOLUTION INTRAMUSCULAR; INTRAVENOUS AS NEEDED
Status: DISCONTINUED | OUTPATIENT
Start: 2025-05-13 | End: 2025-05-13

## 2025-05-13 RX ORDER — SODIUM CHLORIDE, SODIUM LACTATE, POTASSIUM CHLORIDE, CALCIUM CHLORIDE 600; 310; 30; 20 MG/100ML; MG/100ML; MG/100ML; MG/100ML
50 INJECTION, SOLUTION INTRAVENOUS CONTINUOUS
Status: CANCELLED | OUTPATIENT
Start: 2025-05-13

## 2025-05-13 RX ORDER — FENTANYL CITRATE/PF 50 MCG/ML
25 SYRINGE (ML) INJECTION
Status: DISCONTINUED | OUTPATIENT
Start: 2025-05-13 | End: 2025-05-13 | Stop reason: HOSPADM

## 2025-05-13 RX ORDER — ONDANSETRON 2 MG/ML
INJECTION INTRAMUSCULAR; INTRAVENOUS AS NEEDED
Status: DISCONTINUED | OUTPATIENT
Start: 2025-05-13 | End: 2025-05-13

## 2025-05-13 RX ORDER — SODIUM CHLORIDE, SODIUM LACTATE, POTASSIUM CHLORIDE, CALCIUM CHLORIDE 600; 310; 30; 20 MG/100ML; MG/100ML; MG/100ML; MG/100ML
INJECTION, SOLUTION INTRAVENOUS CONTINUOUS PRN
Status: DISCONTINUED | OUTPATIENT
Start: 2025-05-13 | End: 2025-05-13

## 2025-05-13 RX ORDER — LIDOCAINE HYDROCHLORIDE 10 MG/ML
INJECTION, SOLUTION EPIDURAL; INFILTRATION; INTRACAUDAL; PERINEURAL AS NEEDED
Status: DISCONTINUED | OUTPATIENT
Start: 2025-05-13 | End: 2025-05-13

## 2025-05-13 RX ORDER — PROPOFOL 10 MG/ML
INJECTION, EMULSION INTRAVENOUS AS NEEDED
Status: DISCONTINUED | OUTPATIENT
Start: 2025-05-13 | End: 2025-05-13

## 2025-05-13 RX ORDER — CEFAZOLIN SODIUM 2 G/50ML
SOLUTION INTRAVENOUS AS NEEDED
Status: DISCONTINUED | OUTPATIENT
Start: 2025-05-13 | End: 2025-05-13

## 2025-05-13 RX ORDER — HYDROMORPHONE HCL IN WATER/PF 6 MG/30 ML
0.2 PATIENT CONTROLLED ANALGESIA SYRINGE INTRAVENOUS
Status: DISCONTINUED | OUTPATIENT
Start: 2025-05-13 | End: 2025-05-13 | Stop reason: HOSPADM

## 2025-05-13 RX ORDER — METOCLOPRAMIDE HYDROCHLORIDE 5 MG/ML
10 INJECTION INTRAMUSCULAR; INTRAVENOUS ONCE AS NEEDED
Status: DISCONTINUED | OUTPATIENT
Start: 2025-05-13 | End: 2025-05-13 | Stop reason: HOSPADM

## 2025-05-13 RX ORDER — DEXAMETHASONE SODIUM PHOSPHATE 10 MG/ML
INJECTION, SOLUTION INTRAMUSCULAR; INTRAVENOUS AS NEEDED
Status: DISCONTINUED | OUTPATIENT
Start: 2025-05-13 | End: 2025-05-13

## 2025-05-13 RX ADMIN — SODIUM CHLORIDE, SODIUM GLUCONATE, SODIUM ACETATE, POTASSIUM CHLORIDE, MAGNESIUM CHLORIDE, SODIUM PHOSPHATE, DIBASIC, AND POTASSIUM PHOSPHATE 75 ML/HR: .53; .5; .37; .037; .03; .012; .00082 INJECTION, SOLUTION INTRAVENOUS at 09:15

## 2025-05-13 RX ADMIN — METOPROLOL TARTRATE 25 MG: 25 TABLET, FILM COATED ORAL at 20:40

## 2025-05-13 RX ADMIN — SODIUM CHLORIDE, SODIUM LACTATE, POTASSIUM CHLORIDE, AND CALCIUM CHLORIDE: .6; .31; .03; .02 INJECTION, SOLUTION INTRAVENOUS at 07:51

## 2025-05-13 RX ADMIN — CEFAZOLIN SODIUM 2000 MG: 2 SOLUTION INTRAVENOUS at 08:09

## 2025-05-13 RX ADMIN — DEXAMETHASONE SODIUM PHOSPHATE 4 MG: 10 INJECTION, SOLUTION INTRAMUSCULAR; INTRAVENOUS at 08:03

## 2025-05-13 RX ADMIN — FENTANYL CITRATE 25 MCG: 50 INJECTION INTRAMUSCULAR; INTRAVENOUS at 07:58

## 2025-05-13 RX ADMIN — ONDANSETRON 4 MG: 2 INJECTION INTRAMUSCULAR; INTRAVENOUS at 08:03

## 2025-05-13 RX ADMIN — FENTANYL CITRATE 25 MCG: 50 INJECTION INTRAMUSCULAR; INTRAVENOUS at 08:07

## 2025-05-13 RX ADMIN — PROPOFOL 130 MG: 10 INJECTION, EMULSION INTRAVENOUS at 08:03

## 2025-05-13 RX ADMIN — INSULIN LISPRO 2 UNITS: 100 INJECTION, SOLUTION INTRAVENOUS; SUBCUTANEOUS at 16:11

## 2025-05-13 RX ADMIN — INSULIN LISPRO 1 UNITS: 100 INJECTION, SOLUTION INTRAVENOUS; SUBCUTANEOUS at 12:29

## 2025-05-13 RX ADMIN — LIDOCAINE HYDROCHLORIDE 50 MG: 10 INJECTION, SOLUTION EPIDURAL; INFILTRATION; INTRACAUDAL; PERINEURAL at 08:03

## 2025-05-13 RX ADMIN — ATORVASTATIN CALCIUM 80 MG: 40 TABLET, FILM COATED ORAL at 16:11

## 2025-05-13 RX ADMIN — CEFTRIAXONE 1000 MG: 1 INJECTION, SOLUTION INTRAVENOUS at 16:11

## 2025-05-13 RX ADMIN — METOPROLOL TARTRATE 25 MG: 25 TABLET, FILM COATED ORAL at 09:47

## 2025-05-13 NOTE — H&P
H&P - Urology   Name: Osito Carlson 70 y.o. female I MRN: [id number]  Unit/Bed#: -01 I Date of Admission: 5/12/2025   Date of Service: 5/13/2025 I Hospital Day: 0     Assessment & Plan  Bilateral Ureteral stone with hydronephrosis  Bilateral obstructing proximal ureteral stones  UA with cloudy urine, innumerable WBC and innumerable bacteria  - OR for cystoscopy and bilateral ureteral stent placement   Coronary artery disease involving native coronary artery of native heart without angina pectoris    Type 2 diabetes mellitus without complication, without long-term current use of insulin ([facility])  Lab Results   Component Value Date    HGBA1C 6.7 (H) 05/05/2025       Recent Labs     05/12/25 2036 05/13/25  0703   POCGLU 162* 123       Blood Sugar Average: Last 72 hrs:  (P) 142.5      History of Present Illness   Osito Carlson is a 70 y.o. female who presents with right flank pain and found to have bilateral obstructing stones in the proximal ureter with associated hydronephrosis.  No fevers/chills      Review of Systems   All other systems reviewed and are negative.    Historical Information   Past Medical History:   Diagnosis Date    Chronic kidney disease     Colon polyp     Elevated troponin 09/12/2021    Hypertension     Hypertensive urgency 09/12/2021     Past Surgical History:   Procedure Laterality Date    CARDIAC CATHETERIZATION      COLONOSCOPY      HYSTERECTOMY  10/23/2016    Dr. Rayne Pope    OOPHORECTOMY Bilateral     DE CYSTO/URETERO W/LITHOTRIPSY &INDWELL STENT INSRT Left 06/01/2016    Procedure: CYSTOSCOPY; URETEROSCOPY; HOLMIUM LASER LITHOTRIPSY; BASKET STONE EXTRACTION; RETROGRADE PYELOGRAM; STENT PLACEMENT ;  Surgeon: Selvin Goodrich MD;  Location: [facility];  Service: Urology    DE CYSTO/URETERO W/LITHOTRIPSY &INDWELL STENT INSRT Left 04/22/2016    Procedure: CYSTOSCOPY, INSERTION STENT URETERAL;  Surgeon: Selvin Goodrich MD;  Location: [facility];  Service: Urology    TUBAL LIGATION        Social History     Tobacco Use    Smoking status: Never    Smokeless tobacco: Never   Vaping Use    Vaping status: Never Used   Substance and Sexual Activity    Alcohol use: Never    Drug use: Never    Sexual activity: Not on file     E-Cigarette/Vaping    E-Cigarette Use Never User      E-Cigarette/Vaping Substances    Nicotine No     THC No     CBD No     Flavoring No     Other No     Unknown No      Family History   Problem Relation Age of Onset    No Known Problems Mother     No Known Problems Father     No Known Problems Sister     No Known Problems Maternal Grandmother     No Known Problems Maternal Grandfather     No Known Problems Paternal Grandmother     No Known Problems Paternal Grandfather     No Known Problems Maternal Aunt     No Known Problems Maternal Aunt     No Known Problems Maternal Aunt     No Known Problems Maternal Aunt     No Known Problems Paternal Aunt     No Known Problems Son     No Known Problems Son     No Known Problems Son        Objective :  Temp:  [97.6 °F (36.4 °C)-98 °F (36.7 °C)] 98 °F (36.7 °C)  HR:  [59-80] 60  BP: (126-171)/(70-79) 126/70  Resp:  [16-22] 18  SpO2:  [98 %-100 %] 98 %  O2 Device: None (Room air)    I/O         05/11 0701  05/12 0700 05/12 0701  05/13 0700 05/13 0701  05/14 0700    P.O.  125     Total Intake(mL/kg)  125 (1.7)     Net  +125            Unmeasured Urine Occurrence  600 x             Physical Exam  Vitals and nursing note reviewed.   Constitutional:       General: She is not in acute distress.     Appearance: She is well-developed. She is obese.   HENT:      Head: Normocephalic and atraumatic.   Eyes:      Conjunctiva/sclera: Conjunctivae normal.   Cardiovascular:      Rate and Rhythm: Normal rate and regular rhythm.      Heart sounds: No murmur heard.  Pulmonary:      Effort: Pulmonary effort is normal. No respiratory distress.      Breath sounds: Normal breath sounds.   Abdominal:      Palpations: Abdomen is soft.      Tenderness: There is no  abdominal tenderness.   Musculoskeletal:         General: No swelling.      Cervical back: Neck supple.   Skin:     General: Skin is warm and dry.      Capillary Refill: Capillary refill takes less than 2 seconds.   Neurological:      Mental Status: She is alert.   Psychiatric:         Mood and Affect: Mood normal.            Lab Results: I have reviewed the following results:CBC/BMP:   .     05/13/25  0442   WBC 8.04   HGB 12.4   HCT 38.3      SODIUM 141   K 3.8      CO2 25   BUN 17   CREATININE 1.10   GLUC 107    , Urinalysis:   Lab Results   Component Value Date    COLORU Yellow 05/12/2025    CLARITYU Cloudy (A) 05/12/2025    SPECGRAV >=1.030 05/12/2025    PHUR 6.0 05/12/2025    LEUKOCYTESUR Trace (A) 05/12/2025    NITRITE Negative 05/12/2025    GLUCOSEU Negative 05/12/2025    KETONESU Negative 05/12/2025    BILIRUBINUR Negative 05/12/2025    BLOODU 3+ (A) 05/12/2025     Recent Labs     05/13/25  0442   WBC 8.04   HGB 12.4   HCT 38.3      SODIUM 141   K 3.8      CO2 25   BUN 17   CREATININE 1.10   GLUC 107     Lab Results   Component Value Date    COLORU Yellow 05/12/2025    CLARITYU Cloudy (A) 05/12/2025    SPECGRAV >=1.030 05/12/2025    PHUR 6.0 05/12/2025    PHUR 7.5 04/21/2016    LEUKOCYTESUR Trace (A) 05/12/2025    NITRITE Negative 05/12/2025    GLUCOSEU Negative 05/12/2025    KETONESU Negative 05/12/2025    BILIRUBINUR Negative 05/12/2025    BLOODU 3+ (A) 05/12/2025   ,   Lab Results   Component Value Date    URINECX >100,000 cfu/ml Staphylococcus saprophyticus 04/22/2016    URINECX  04/22/2016     80,000-89,000 cfu/ml Staphylococcus coagulase negative       Imaging Results Review: I personally reviewed the following image studies in PACS and associated radiology reports: CT abdomen/pelvis. My interpretation of the radiology images/reports is: bilateral proximal ureteral stones with associated hydronephrosis .  Other Study Results Review: No additional pertinent studies  reviewed.    VTE Prophylaxis: Sequential compression device (Venodyne)

## 2025-05-13 NOTE — TELEPHONE ENCOUNTER
Had bilateral ureteral stents placed for bilateral obstructing ureteral stones with hydronephrosis.  Needs to be scheduled for outpatient bilateral ureteroscopy, laser lithotripsy, stent exchange.  Case request placed.  Please adjust time of surgery to account for bilateral procedure.

## 2025-05-13 NOTE — ASSESSMENT & PLAN NOTE
Creatinine elevated 1.1 today, not quite PEG was baseline Cr closer to 0.8   Likely due to obstructive uropathy with bilateral renal stones  Start IVFs with Isolyte, recheck BMP in a.m.

## 2025-05-13 NOTE — ASSESSMENT & PLAN NOTE
Patient presented with right flank/lower back pain, does report prior history of kidney stones.  CT a/p: Bilateral proximal ureteral calculi measuring 5 mm cause moderate bilateral hydroureteronephrosis, as well as right perinephric fat stranding.   UA concerning for UTI with innumerable WBCs/bacteria, though only with trace leukocytes.   Urine cx: Pending. Patient denied any s/sx UTI on admit.  S/p bilateral cystoscopy retrograde pyelogram with insertion of bilateral ureteral stents (5/13)  Urology following, will need to F/U outpatient for bilateral ureteroscopy, laser lithotripsy and stent exchange  D2 IV Rocephin, narrow pending urine cx results - would opt to treat for presumed UTI/pyelonephritis given presence of ureteral stents

## 2025-05-13 NOTE — OP NOTE
OPERATIVE REPORT  PATIENT NAME: Osito Carlson    :  1955  MRN: [id number]  Pt Location:  OR ROOM 03    SURGERY DATE: 2025    Surgeons and Role:     * Phoenix Lewis MD - Primary    Preop Diagnosis:  Bilateral ureteral calculi [N20.1]    Post-Op Diagnosis Codes:     * Bilateral ureteral calculi [N20.1]    Procedure(s):  Bilateral - CYSTOSCOPY RETROGRADE PYELOGRAM WITH INSERTION STENT URETERAL    Specimen(s):  * No specimens in log *    Estimated Blood Loss:   Minimal    Drains:  Ureteral Internal Stent Left ureter 6 Fr. (Active)   Number of days: 0       Ureteral Internal Stent Right ureter 6 Fr. (Active)   Number of days: 0       Anesthesia Type:   General    Operative Indications:  Bilateral ureteral calculi [N20.1]  Bilateral hydronephrosis    Operative Findings:  Bilateral severe hydronephrosis on retrograde pyelogram      Complications:   None    Procedure and Technique:    INDICATIONS FOR PROCEDURE:  Osito Carlson is an 70 y.o. old female with bilateral obstructing ureteral stones.  After discussing the options, the patient elected to undergo ureteral stent placement.  We discussed the procedure in detail, the alternatives, and the risks, and they signed informed consent to proceed.  The appropriate laterality was marked    PROCEDURE IN DETAIL:   The patient was identified and brought to the OR.  Antibiotic prophylaxis and DVT prophylaxis were administered.  They were placed in the lithotomy position with care to pad all pressure points.  They were prepared and draped in the usual sterile fashion.    A surgical time out was performed with all in the room in agreement with the correct patient, procedure, indications, and laterality.  A 22-Bulgarian rigid cystoscope was used to enter the bladder.  The bladder was inspected in its entirety and there were no lesions noted.  The ureteral orifices were identified in their orthotopic positions.     The left ureteral orifice was identified and a 10-Bulgarian  dual lumen catheter was placed into the ureteral orifice.  A retrograde pyelogram was performed with injection of contrast which demonstrated severe hydroureteronephrosis.  A wire was passed up to the kidney under fluoroscopic guidance.    A 6 Fr x 22 cm JJ stent was then passed up the wire  under fluoroscopic guidance into the renal pelvis of the  kidney with a good curl noted in the kidney and in the bladder.   The bladder was drained.      Attention was then turned to the right ureteral orifice and a 10-Malian dual lumen catheter was placed into the ureteral orifice.  A retrograde pyelogram was performed with injection of contrast which demonstrated severe hydroureteronephrosis.  A wire was passed up to the kidney under fluoroscopic guidance.    A 6 Fr x 22 cm JJ stent was then passed up the wire  under fluoroscopic guidance into the renal pelvis of the  kidney with a good curl noted in the kidney and in the bladder.   The bladder was drained.        The patient was placed back in the supine position, awakened from general anesthesia and brought to the recovery room in stable condition.      IMPLANTS:   Implant Name Type Inv. Item Serial No.  Lot No. LRB No. Used Action   URETERAL STENT 6 FR X 24 CM OPTIMA INLAY - [id number]  STENT URETERAL 6FR 24CML INLAY [employer]IM[employer]  [employer] [id number] Left 1 Explanted   URETERAL STENT 6 FR X 24 CM OPTIMA INLAY - [id number]  STENT URETERAL 6FR 24CML INLAY [employer]IMA  [employer] [id number] Left 1 Explanted   STENT URETERAL 6FR 22CM INLAY OPTIMA W/NITINOL GDWR - [id number]  STENT URETERAL 6FR 22CM INLAY OPTIMA W/NITINOL GDWR  [employer] [id number] Left 1 Implanted   STENT URETERAL 6FR 22CM INLAY OPTIMA W/NITINOL GDWR - [id number]  STENT URETERAL 6FR 22CM INLAY OPTIMA W/NITINOL GDWR  [employer] [id number] Right 1 Implanted       I was present for the entire procedure.    Patient Disposition:  PACU       Patient will need to be scheduled for  bilateral ureteroscopy, laser lithotripsy, and stent exchange as an outpatient      SIGNATURE: Phoenix Lewis MD  DATE: May 13, 2025  TIME: 8:24 AM

## 2025-05-13 NOTE — ASSESSMENT & PLAN NOTE
Lab Results   Component Value Date    HGBA1C 6.7 (H) 05/05/2025       Recent Labs     05/12/25 2036 05/13/25  0703   POCGLU 162* 123       Blood Sugar Average: Last 72 hrs:  (P) 142.5

## 2025-05-13 NOTE — PROGRESS NOTES
Progress Note - Hospitalist   Name: Osito Carlson 70 y.o. female I MRN: [id number]  Unit/Bed#: -01 I Date of Admission: 5/12/2025   Date of Service: 5/13/2025 I Hospital Day: 0    Assessment & Plan  Bilateral Ureteral stone with hydronephrosis  Patient presented with right flank/lower back pain, does report prior history of kidney stones.  CT a/p: Bilateral proximal ureteral calculi measuring 5 mm cause moderate bilateral hydroureteronephrosis, as well as right perinephric fat stranding.   UA concerning for UTI with innumerable WBCs/bacteria, though only with trace leukocytes.   Urine cx: Pending. Patient denied any s/sx UTI on admit.  S/p bilateral cystoscopy retrograde pyelogram with insertion of bilateral ureteral stents (5/13)  Urology following, will need to F/U outpatient for bilateral ureteroscopy, laser lithotripsy and stent exchange  D2 IV Rocephin, narrow pending urine cx results - would opt to treat for presumed UTI/pyelonephritis given presence of ureteral stents  Elevated serum creatinine  Creatinine elevated 1.1 today, not quite PEG was baseline Cr closer to 0.8   Likely due to obstructive uropathy with bilateral renal stones  Start IVFs with Isolyte, recheck BMP in a.m.  Coronary artery disease involving native coronary artery of native heart without angina pectoris  Status post PCI in 2021  Patient no longer on Brilinta  Continue aspirin, statin  Type 2 diabetes mellitus without complication, without long-term current use of insulin ([facility])  Lab Results   Component Value Date    HGBA1C 6.7 (H) 05/05/2025     Recent Labs     05/12/25  2036 05/13/25  0703 05/13/25  1116   POCGLU 162* 123 171*     Blood Sugar Average: Last 72 hrs:  (P) 152  Hold home metformin while inpatient  SSI coverage with Accu-Cheks TID AC  HTN (hypertension)  BP reviewed, currently acceptable  Continue Lopressor  Hyperlipidemia  Continue statin    VTE Pharmacologic Prophylaxis: VTE Score: 2 Low Risk (Score 0-2) - Encourage  Ambulation.    Mobility:   Basic Mobility Inpatient Raw Score: 24  JH-HLM Goal: 8: Walk 250 feet or more  JH-HLM Achieved: 7: Walk 25 feet or more  JH-HLM Goal NOT achieved. Continue with multidisciplinary rounding and encourage appropriate mobility to improve upon JH-HLM goals.    Patient Centered Rounds: I performed bedside rounds with nursing staff today.   Discussions with Specialists or Other Care Team Provider: Case management    Education and Discussions with Family / Patient: Updated  () at bedside.    Current Length of Stay: 0 day(s)  Current Patient Status: Observation   Certification Statement: The patient will continue to require additional inpatient hospital stay due to IV antibiotics, IV fluids  Discharge Plan: Anticipate discharge in 24-48 hrs to home.    Code Status: Level 1 - Full Code    Subjective   Patient is seen at bedside this a.m. after cystoscopy, notes having some dysuria and hematuria with going to urinate but otherwise no abdominal pain and improved right flank pain.    Objective :  Temp:  [97.1 °F (36.2 °C)-98 °F (36.7 °C)] 97.7 °F (36.5 °C)  HR:  [54-62] 62  BP: (126-181)/(68-80) 137/75  Resp:  [16-22] 16  SpO2:  [94 %-99 %] 95 %  O2 Device: None (Room air)    Body mass index is 30.54 kg/m².     Input and Output Summary (last 24 hours):     Intake/Output Summary (Last 24 hours) at 5/13/2025 1445  Last data filed at 5/13/2025 1433  Gross per 24 hour   Intake 1015 ml   Output 450 ml   Net 565 ml       Physical Exam  Constitutional:       General: She is not in acute distress.     Appearance: She is not ill-appearing, toxic-appearing or diaphoretic.   Cardiovascular:      Rate and Rhythm: Normal rate and regular rhythm.      Pulses: Normal pulses.      Heart sounds: Normal heart sounds.   Pulmonary:      Effort: Pulmonary effort is normal. No respiratory distress.      Breath sounds: Normal breath sounds.   Abdominal:      General: Bowel sounds are normal. There is no  distension.      Palpations: Abdomen is soft.      Tenderness: There is no abdominal tenderness.   Musculoskeletal:         General: No swelling or tenderness.   Skin:     General: Skin is warm.   Neurological:      General: No focal deficit present.      Mental Status: She is alert.   Psychiatric:         Mood and Affect: Mood normal.         Behavior: Behavior normal.           Lines/Drains:  Lines/Drains/Airways       Active Status       Name Placement date Placement time Site Days    Ureteral Internal Stent Left ureter 6 Fr. 05/13/25  0815  Left ureter  less than 1    Ureteral Internal Stent Right ureter 6 Fr. 05/13/25  0821  Right ureter  less than 1                            Lab Results: I have reviewed the following results:   Results from last 7 days   Lab Units 05/13/25  0442 05/12/25  1516   WBC Thousand/uL 8.04 9.63   HEMOGLOBIN g/dL 12.4 13.2   HEMATOCRIT % 38.3 40.2   PLATELETS Thousands/uL 194 190   SEGS PCT %  --  77*   LYMPHO PCT %  --  11*   MONO PCT %  --  9   EOS PCT %  --  1     Results from last 7 days   Lab Units 05/13/25  0442   SODIUM mmol/L 141   POTASSIUM mmol/L 3.8   CHLORIDE mmol/L 106   CO2 mmol/L 25   BUN mg/dL 17   CREATININE mg/dL 1.10   ANION GAP mmol/L 10   CALCIUM mg/dL 9.8   GLUCOSE RANDOM mg/dL 107         Results from last 7 days   Lab Units 05/13/25  1116 05/13/25  0703 05/12/25  2036   POC GLUCOSE mg/dl 171* 123 162*               Recent Cultures (last 7 days):   Results from last 7 days   Lab Units 05/12/25  1825   BLOOD CULTURE  Received in Microbiology Lab. Culture in Progress.  Received in Microbiology Lab. Culture in Progress.       Imaging Results Review: I reviewed radiology reports from this admission including: procedure reports.  Other Study Results Review: No additional pertinent studies reviewed.    Last 24 Hours Medication List:     Current Facility-Administered Medications:     acetaminophen (TYLENOL) tablet 650 mg, Q6H PRN    [Held by provider] aspirin  chewable tablet 81 mg, Daily    atorvastatin (LIPITOR) tablet 80 mg, Daily With Dinner    cefTRIAXone (ROCEPHIN) IVPB (premix in dextrose) 1,000 mg 50 mL, Q24H    insulin lispro (HumALOG/ADMELOG) 100 units/mL subcutaneous injection 1-5 Units, TID AC **AND** Fingerstick Glucose (POCT), TID AC    metoprolol tartrate (LOPRESSOR) tablet 25 mg, Q12H SARAHY    multi-electrolyte (Plasmalyte-A/Isolyte-S PH 7.4/Normosol-R) IV solution, Continuous, Last Rate: 75 mL/hr (05/13/25 4472)    Administrative Statements   Today, Patient Was Seen By: Yun Parnell PA-C  I have spent a total time of 35 minutes in caring for this patient on the day of the visit/encounter including Patient and family education, Counseling / Coordination of care, Documenting in the medical record, and Reviewing/placing orders in the medical record (including tests, medications, and/or procedures).    **Please Note: This note may have been constructed using a voice recognition system.**

## 2025-05-13 NOTE — ASSESSMENT & PLAN NOTE
Lab Results   Component Value Date    HGBA1C 6.7 (H) 05/05/2025     Recent Labs     05/12/25 2036 05/13/25  0703 05/13/25  1116   POCGLU 162* 123 171*     Blood Sugar Average: Last 72 hrs:  (P) 152  Hold home metformin while inpatient  SSI coverage with Accu-Cheks TID AC

## 2025-05-13 NOTE — ANESTHESIA PREPROCEDURE EVALUATION
Procedure:  CYSTOSCOPY RETROGRADE PYELOGRAM WITH INSERTION STENT URETERAL (Bilateral: Ureter)    Relevant Problems   CARDIO   (+) Chest pain at rest   (+) Coronary artery disease involving native coronary artery of native heart without angina pectoris   (+) HTN (hypertension)   (+) History of PTCA with stent   (+) Hyperlipidemia   (+) MI (myocardial infarction) (HCC)   (+) Presence of drug coated stent in LAD coronary artery   (+) Presence of drug-eluting stent in left circumflex coronary artery      ENDO   (+) Type 2 diabetes mellitus without complication, without long-term current use of insulin (HCC)      /RENAL   (+) Bilateral Ureteral stone with hydronephrosis   (+) Nephrolithiasis      HEMATOLOGY   (+) Anemia        Physical Exam    Airway    Mallampati score: III  TM Distance: >3 FB  Neck ROM: full     Dental    lower dentures and upper dentures    Cardiovascular  Rhythm: regular, Rate: normal    Pulmonary   Breath sounds clear to auscultation    Other Findings  post-pubertal.      Anesthesia Plan  ASA Score- 3     Anesthesia Type- general with ASA Monitors.         Additional Monitors:     Airway Plan: LMA.           Plan Factors-    Chart reviewed.        Patient is not a current smoker.              Induction- intravenous.    Postoperative Plan-     Perioperative Resuscitation Plan - Level 1 - Full Code.       Informed Consent- Anesthetic plan and risks discussed with patient.  I personally reviewed this patient with the CRNA. Discussed and agreed on the Anesthesia Plan with the CRNA..      NPO Status:  Vitals Value Taken Time   Date of last liquid 05/12/25 05/13/25 0733   Time of last liquid 2200 05/13/25 0733   Date of last solid 05/12/25 05/13/25 0733   Time of last solid 1730 05/13/25 0733

## 2025-05-13 NOTE — ASSESSMENT & PLAN NOTE
Bilateral obstructing proximal ureteral stones  UA with cloudy urine, innumerable WBC and innumerable bacteria  - OR for cystoscopy and bilateral ureteral stent placement

## 2025-05-13 NOTE — PLAN OF CARE
Problem: GENITOURINARY - ADULT  Goal: Maintains or returns to baseline urinary function  Description: INTERVENTIONS:- Assess urinary function- Encourage oral fluids to ensure adequate hydration if ordered- Administer IV fluids as ordered to ensure adequate hydration- Administer ordered medications as needed- Offer frequent toileting- Follow urinary retention protocol if ordered  Outcome: Progressing  Goal: Absence of urinary retention  Description: INTERVENTIONS:- Assess patient’s ability to void and empty bladder- Monitor I/O- Bladder scan as needed- Discuss with physician/AP medications to alleviate retention as needed- Discuss catheterization for long term situations as appropriate  Outcome: Progressing

## 2025-05-13 NOTE — ANESTHESIA POSTPROCEDURE EVALUATION
Post-Op Assessment Note    CV Status:  Stable  Pain Score: 0    Pain management: adequate       Mental Status:  Alert and sleepy   Hydration Status:  Stable   PONV Controlled:  None   Airway Patency:  Patent     Post Op Vitals Reviewed: Yes    No anethesia notable event occurred.    Staff: CRNA           Last Filed PACU Vitals:  Vitals Value Taken Time   Temp 97.7    Pulse 56    /69    Resp 12    SpO2 99

## 2025-05-13 NOTE — ANESTHESIA POSTPROCEDURE EVALUATION
Post-Op Assessment Note    CV Status:  Stable  Pain Score: 2    Pain management: adequate       Mental Status:  Awake   Hydration Status:  Stable   PONV Controlled:  None   Airway Patency:  Patent     Post Op Vitals Reviewed: Yes    No anethesia notable event occurred.    Staff: Anesthesiologist           Last Filed PACU Vitals:  Vitals Value Taken Time   Temp 98 °F (36.7 °C) 05/13/25 0850   Pulse 56 05/13/25 0853   /74 05/13/25 0850   Resp 13 05/13/25 0853   SpO2 95 % 05/13/25 0853   Vitals shown include unfiled device data.    Modified Michelle:     Vitals Value Taken Time   Activity 2 05/13/25 0850   Respiration 2 05/13/25 0850   Circulation 2 05/13/25 0850   Consciousness 2 05/13/25 0850   Oxygen Saturation 2 05/13/25 0850     Modified Michelle Score: 10

## 2025-05-13 NOTE — PLAN OF CARE
Problem: GENITOURINARY - ADULT  Goal: Maintains or returns to baseline urinary function  Description: INTERVENTIONS:- Assess urinary function- Encourage oral fluids to ensure adequate hydration if ordered- Administer IV fluids as ordered to ensure adequate hydration- Administer ordered medications as needed- Offer frequent toileting- Follow urinary retention protocol if ordered  5/12/2025 2346 by Mary Vargas RN  Outcome: Progressing  5/12/2025 2108 by Mary Vargas RN  Outcome: Progressing     Problem: GENITOURINARY - ADULT  Goal: Absence of urinary retention  Description: INTERVENTIONS:- Assess patient’s ability to void and empty bladder- Monitor I/O- Bladder scan as needed- Discuss with physician/AP medications to alleviate retention as needed- Discuss catheterization for long term situations as appropriate  5/12/2025 2346 by Mary Vargas RN  Outcome: Progressing  5/12/2025 2108 by Mary Vargas RN  Outcome: Progressing

## 2025-05-14 ENCOUNTER — APPOINTMENT (INPATIENT)
Dept: ULTRASOUND IMAGING | Facility: HOSPITAL | Age: 70
DRG: 661 | End: 2025-05-14
Payer: MEDICARE

## 2025-05-14 VITALS
SYSTOLIC BLOOD PRESSURE: 146 MMHG | BODY MASS INDEX: 30.73 KG/M2 | TEMPERATURE: 97.6 F | RESPIRATION RATE: 16 BRPM | DIASTOLIC BLOOD PRESSURE: 73 MMHG | WEIGHT: 167 LBS | HEIGHT: 62 IN | OXYGEN SATURATION: 98 % | HEART RATE: 55 BPM

## 2025-05-14 LAB
ANION GAP SERPL CALCULATED.3IONS-SCNC: 9 MMOL/L (ref 4–13)
BUN SERPL-MCNC: 16 MG/DL (ref 5–25)
CALCIUM SERPL-MCNC: 9.4 MG/DL (ref 8.4–10.2)
CHLORIDE SERPL-SCNC: 106 MMOL/L (ref 96–108)
CO2 SERPL-SCNC: 26 MMOL/L (ref 21–32)
CREAT SERPL-MCNC: 0.82 MG/DL (ref 0.6–1.3)
ERYTHROCYTE [DISTWIDTH] IN BLOOD BY AUTOMATED COUNT: 12.5 % (ref 11.6–15.1)
GFR SERPL CREATININE-BSD FRML MDRD: 72 ML/MIN/1.73SQ M
GLUCOSE SERPL-MCNC: 126 MG/DL (ref 65–140)
GLUCOSE SERPL-MCNC: 141 MG/DL (ref 65–140)
GLUCOSE SERPL-MCNC: 147 MG/DL (ref 65–140)
GLUCOSE SERPL-MCNC: 164 MG/DL (ref 65–140)
HCT VFR BLD AUTO: 38.9 % (ref 34.8–46.1)
HGB BLD-MCNC: 12.5 G/DL (ref 11.5–15.4)
MCH RBC QN AUTO: 28.5 PG (ref 26.8–34.3)
MCHC RBC AUTO-ENTMCNC: 32.1 G/DL (ref 31.4–37.4)
MCV RBC AUTO: 89 FL (ref 82–98)
PLATELET # BLD AUTO: 208 THOUSANDS/UL (ref 149–390)
PMV BLD AUTO: 10.7 FL (ref 8.9–12.7)
POTASSIUM SERPL-SCNC: 3.9 MMOL/L (ref 3.5–5.3)
RBC # BLD AUTO: 4.38 MILLION/UL (ref 3.81–5.12)
SODIUM SERPL-SCNC: 141 MMOL/L (ref 135–147)
WBC # BLD AUTO: 10.79 THOUSAND/UL (ref 4.31–10.16)

## 2025-05-14 PROCEDURE — 80048 BASIC METABOLIC PNL TOTAL CA: CPT | Performed by: PHYSICIAN ASSISTANT

## 2025-05-14 PROCEDURE — 82948 REAGENT STRIP/BLOOD GLUCOSE: CPT

## 2025-05-14 PROCEDURE — 76775 US EXAM ABDO BACK WALL LIM: CPT

## 2025-05-14 PROCEDURE — NC001 PR NO CHARGE: Performed by: PHYSICIAN ASSISTANT

## 2025-05-14 PROCEDURE — 85027 COMPLETE CBC AUTOMATED: CPT | Performed by: PHYSICIAN ASSISTANT

## 2025-05-14 PROCEDURE — 99239 HOSP IP/OBS DSCHRG MGMT >30: CPT | Performed by: PHYSICIAN ASSISTANT

## 2025-05-14 RX ORDER — CEFTRIAXONE 1 G/50ML
1000 INJECTION, SOLUTION INTRAVENOUS EVERY 24 HOURS
Status: DISCONTINUED | OUTPATIENT
Start: 2025-05-14 | End: 2025-05-14 | Stop reason: HOSPADM

## 2025-05-14 RX ORDER — CEPHALEXIN 500 MG/1
500 CAPSULE ORAL EVERY 6 HOURS SCHEDULED
Qty: 16 CAPSULE | Refills: 0 | Status: SHIPPED | OUTPATIENT
Start: 2025-05-15 | End: 2025-05-19

## 2025-05-14 RX ADMIN — METOPROLOL TARTRATE 25 MG: 25 TABLET, FILM COATED ORAL at 08:49

## 2025-05-14 RX ADMIN — INSULIN LISPRO 1 UNITS: 100 INJECTION, SOLUTION INTRAVENOUS; SUBCUTANEOUS at 11:27

## 2025-05-14 RX ADMIN — ASPIRIN 81 MG 81 MG: 81 TABLET ORAL at 08:49

## 2025-05-14 RX ADMIN — ATORVASTATIN CALCIUM 80 MG: 40 TABLET, FILM COATED ORAL at 16:22

## 2025-05-14 RX ADMIN — CEFTRIAXONE 1000 MG: 1 INJECTION, SOLUTION INTRAVENOUS at 16:23

## 2025-05-14 NOTE — ASSESSMENT & PLAN NOTE
Lab Results   Component Value Date    HGBA1C 6.7 (H) 05/05/2025     Recent Labs     05/13/25  1116 05/13/25  1546 05/14/25  0717 05/14/25  1109   POCGLU 171* 244* 147* 164*     Blood Sugar Average: Last 72 hrs:  (P) 168.5  Hold home metformin while inpatient  SSI coverage with Accu-Cheks TID AC

## 2025-05-14 NOTE — ASSESSMENT & PLAN NOTE
BP reviewed, currently acceptable  Continue Lopressor   Report given to Julianna Little from [facility]. Report method in person   The following was reviewed with receiving RN:   Current vital signs:  BP (!) 116/59   Pulse 70   Temp 97.8 °F (36.6 °C) (Temporal)   Resp 27   Ht 5' 4\" (1.626 m)   Wt 130 lb (59 kg)   SpO2 97%   BMI 22.31 kg/m²                MEWS Score: 4      RN notified that straight cath was just completed. RN also notified that BP was hypotensive. Any medication or safety alerts were reviewed. Any pending diagnostics and notifications were also reviewed, as well as any safety concerns or issues, abnormal labs, abnormal imaging, and abnormal assessment findings. Questions were answered.             Deland Ganser, RN  11/07/19 2220

## 2025-05-14 NOTE — ASSESSMENT & PLAN NOTE
Patient presented with right flank/lower back pain, does report prior history of kidney stones.  CT a/p: Bilateral proximal ureteral calculi measuring 5 mm cause moderate bilateral hydroureteronephrosis, as well as right perinephric fat stranding.   UA concerning for UTI with innumerable WBCs/bacteria, though only with trace leukocytes.   Urine cx: Mixed contaminants. BC x2: NGTD (24 hrs). Patient denied any s/sx of UT on admit.   S/p bilateral cystoscopy retrograde pyelogram with insertion of bilateral ureteral stents (5/13)  Urology following, will need to F/U outpatient for bilateral ureteroscopy, laser lithotripsy and stent exchange  Received 2 doses IV Rocephin, check repeat renal US -if stable/improved hopefully can switch to PO Keflex to complete total 7-day abx course

## 2025-05-14 NOTE — DISCHARGE SUMMARY
Discharge Summary - Hospitalist   Name: Osito Carlson 70 y.o. female I MRN: [id number]  Unit/Bed#: -01 I Date of Admission: 5/12/2025   Date of Service: 5/14/2025 I Hospital Day: 1     Assessment & Plan  Bilateral Ureteral stone with hydronephrosis  Patient presented with right flank/lower back pain, does report prior history of kidney stones.  CT a/p: Bilateral proximal ureteral calculi measuring 5 mm cause moderate bilateral hydroureteronephrosis, as well as right perinephric fat stranding.   UA with innumerable WBCs/bacteria, though only with trace leukocytes. More likely 2/2 stones and less likely UTI.  Urine cx: Mixed contaminants. BC x2: NGTD (24 hrs). Patient denied any s/sx of UT on admit.   S/p bilateral cystoscopy retrograde pyelogram with insertion of bilateral ureteral stents (5/13)  Follow-up renal US with no evidence of perinephric fluid collections or pyelonephritis, improved right hydronephrosis  Received 3 doses IV Rocephin, switch to PO Keflex to complete total 7-day abx course - F/U with urology to schedule bilateral ureteroscopy, laser lithotripsy and stent exchange  Elevated serum creatinine  Creatinine elevated 1.1 today, not quite PEG was baseline Cr closer to 0.8   Likely due to obstructive uropathy with bilateral renal stones  Cr improved, back to baseline at 0.82 & can D/C IVFs  Coronary artery disease involving native coronary artery of native heart without angina pectoris  Status post PCI in 2021  Patient no longer on Brilinta  Continue aspirin, statin  Type 2 diabetes mellitus without complication, without long-term current use of insulin ([facility])  Lab Results   Component Value Date    HGBA1C 6.7 (H) 05/05/2025     Recent Labs     05/13/25  1546 05/14/25  0717 05/14/25  1109 05/14/25  1612   POCGLU 244* 147* 164* 126     Blood Sugar Average: Last 72 hrs:  (P) 162.6224859914275157  Hold home metformin while inpatient  SSI coverage with Accu-Cheks TID AC  HTN (hypertension)  BP  reviewed, currently acceptable  Continue Lopressor  Hyperlipidemia  Continue statin     Medical Problems       Resolved Problems  Date Reviewed: 7/7/2023   None       Discharging Physician / Practitioner: Yun Parnell PA-C  PCP: Rose Mary Barajas MD  Admission Date:   Admission Orders (From admission, onward)       Ordered        05/13/25 1713  INPATIENT ADMISSION  Once            05/12/25 1730  Place in Observation  Once                          Discharge Date: 05/14/25    Consultations During Hospital Stay:  Urology    Procedures Performed:   S/p bilateral cystoscopy retrograde pyelogram with insertion of bilateral ureteral stents (5/13/25)    Significant Findings / Test Results:   CT a/p: Bilateral proximal ureteral calculi measuring 5 mm cause moderate bilateral hydroureteronephrosis, as well as right perinephric fat stranding.   US kidney/bladder: Mild right-sided hydronephrosis. Partially visualized ureteral stent. Moderate left-sided hydronephrosis. Partially visualized ureteral stent with adjacent 8 mm calculus. Left-sided nephrolithiasis.  Urine culture: Mixed contaminants x 3.    Incidental Findings:   None    Test Results Pending at Discharge (will require follow up):   None     Outpatient Tests Requested:  Follow-up with urology outpatient, to be scheduled for repeat bilateral ureteroscopy, laser lithotripsy and stent exchange    Complications:  None    Reason for Admission: Bilateral ureteral stone with hydronephrosis    Hospital Course:   Osito Carlson is a 70 y.o. female patient, PMH CAD, T2DM, HTN, HLD, who originally presented to the hospital on 5/12/2025 due to bilateral ureteral stones with hydronephrosis, initially presented with right flank pain.  On arrival to ED, CT imaging revealed bilateral ureteral calculi causing moderate bilateral hydroureteronephrosis. UA was suggestive of possible UTI, however urine cultures with mixed contaminants and no growth on blood cultures. Urology was  consulted, performed cystoscopy with placement of bilateral ureteral stents on 5/13/25 with good response. Patient clinically improved, follow-up renal ultrasound post cystoscopy did not show evidence of pyelonephritis. Patient received IV Rocephin while inpatient, can switch to Keflex to complete total 7-day antibiotic course upon discharge.    No notes on file    Please see above list of diagnoses and related plan for additional information.     Condition at Discharge: stable    Discharge Day Visit / Exam:   * Please refer to separate progress note for these details *    Discussion with Family: Patient declined call to .     Discharge instructions/Information to patient and family:   See after visit summary for information provided to patient and family.      Provisions for Follow-Up Care:  See after visit summary for information related to follow-up care and any pertinent home health orders.      Mobility at time of Discharge:   Basic Mobility Inpatient Raw Score: 24  JH-HLM Goal: 8: Walk 250 feet or more  JH-HLM Achieved: 1: Laying in bed  HLM Goal NOT achieved. Continue to encourage mobility in post discharge setting.     Disposition:   Home    Planned Readmission: None    Discharge Medications:  See after visit summary for reconciled discharge medications provided to patient and/or family.      Administrative Statements   Discharge Statement:  I have spent a total time of 45 minutes in caring for this patient on the day of the visit/encounter. >30 minutes of time was spent on: Counseling / Coordination of care, Documenting in the medical record, Reviewing / ordering tests, medicine, procedures  , and Communicating with other healthcare professionals .    **Please Note: This note may have been constructed using a voice recognition system**

## 2025-05-14 NOTE — NURSING NOTE
Pt taken with all belongings to car by this writer.  VSS and denied pain or discomfort upon departure.  Understood AVS and no questions offered.

## 2025-05-14 NOTE — PLAN OF CARE
Problem: GENITOURINARY - ADULT  Goal: Maintains or returns to baseline urinary function  Description: INTERVENTIONS:- Assess urinary function- Encourage oral fluids to ensure adequate hydration if ordered- Administer IV fluids as ordered to ensure adequate hydration- Administer ordered medications as needed- Offer frequent toileting- Follow urinary retention protocol if ordered  Outcome: Progressing  Goal: Absence of urinary retention  Description: INTERVENTIONS:- Assess patient’s ability to void and empty bladder- Monitor I/O- Bladder scan as needed- Discuss with physician/AP medications to alleviate retention as needed- Discuss catheterization for long term situations as appropriate  Outcome: Progressing  Goal: Urinary catheter remains patent  Description: INTERVENTIONS:- Assess patency of urinary catheter- If patient has a chronic bonilla, consider changing catheter if non-functioning- Follow guidelines for intermittent irrigation of non-functioning urinary catheter  Outcome: Progressing     Problem: PAIN - ADULT  Goal: Verbalizes/displays adequate comfort level or baseline comfort level  Description: Interventions:- Encourage patient to monitor pain and request assistance- Assess pain using appropriate pain scale- Administer analgesics based on type and severity of pain and evaluate response- Implement non-pharmacological measures as appropriate and evaluate response- Consider cultural and social influences on pain and pain management- Notify physician/advanced practitioner if interventions unsuccessful or patient reports new pain  Outcome: Progressing     Problem: INFECTION - ADULT  Goal: Absence or prevention of progression during hospitalization  Description: INTERVENTIONS:- Assess and monitor for signs and symptoms of infection- Monitor lab/diagnostic results- Monitor all insertion sites, i.e. indwelling lines, tubes, and drains- Monitor endotracheal if appropriate and nasal secretions for changes in amount  and color- Alden appropriate cooling/warming therapies per order- Administer medications as ordered- Instruct and encourage patient and family to use good hand hygiene technique- Identify and instruct in appropriate isolation precautions for identified infection/condition  Outcome: Progressing  Goal: Absence of fever/infection during neutropenic period  Description: INTERVENTIONS:- Monitor WBC  Outcome: Progressing

## 2025-05-14 NOTE — ASSESSMENT & PLAN NOTE
Patient presented with right flank/lower back pain, does report prior history of kidney stones.  CT a/p: Bilateral proximal ureteral calculi measuring 5 mm cause moderate bilateral hydroureteronephrosis, as well as right perinephric fat stranding.   UA with innumerable WBCs/bacteria, though only with trace leukocytes. More likely 2/2 stones and less likely UTI.  Urine cx: Mixed contaminants. BC x2: NGTD (24 hrs). Patient denied any s/sx of UT on admit.   S/p bilateral cystoscopy retrograde pyelogram with insertion of bilateral ureteral stents (5/13)  Follow-up renal US with no evidence of perinephric fluid collections or pyelonephritis, improved right hydronephrosis  Received 3 doses IV Rocephin, switch to PO Keflex to complete total 7-day abx course - F/U with urology to schedule bilateral ureteroscopy, laser lithotripsy and stent exchange

## 2025-05-14 NOTE — ASSESSMENT & PLAN NOTE
Patient creatine now down to .82 patient seems to be responding appropriately to hydration, continue IV fluids monitor with BMP

## 2025-05-14 NOTE — PROGRESS NOTES
Progress Note - Internal Medicine   Name: Osito Carlson 70 y.o. female I MRN: [id number]  Unit/Bed#: -01 I Date of Admission: 5/12/2025   Date of Service: 5/14/2025 I Hospital Day: 1  { ?Quick Links I Problem List I PORCH I Billing Tip:60518}  Assessment & Plan  Bilateral Ureteral stone with hydronephrosis  Patient underwent ureteral stenting on 5-13, stents currently in place BMP shows no evidence for an charles, white count mildly elevated. Currently on recephin IV for UTI.  Patient asymptomatic at this time, perform repeat US of kidneys as last CT on 5-12 showed potential evidence of pyelonephritis.     Continue IV fluid repletion, check daily BMP to assess kidney function. Daily weights and Is and Os     Assuming patient US clear and patient continues to be asymptomatic, Continue Rocephin for 3 more days  transfer on to oral course of Keflex for 7 days.     Coronary artery disease involving native coronary artery of native heart without angina pectoris  Continue Aspirin atorvastatin and metoprolol regimen. Vitals q 8.   Hyperlipidemia  Continue atorvastatin  Type 2 diabetes mellitus without complication, without long-term current use of insulin ([facility])  Lab Results   Component Value Date    HGBA1C 6.7 (H) 05/05/2025   Continue humalog bolus coverage of meals with TID fingerstick glucose checks    Recent Labs     05/13/25  0703 05/13/25  1116 05/13/25  1546 05/14/25  0717   POCGLU 123 171* 244* 147*       Blood Sugar Average: Last 72 hrs:  (P) 169.4    History of PTCA with stent  As above for CAD  HTN (hypertension)  As above for CAD  MI (myocardial infarction) (HCC)  As above for CAD  Elevated serum creatinine  Patient creatine now down to .82 patient seems to be responding appropriately to hydration, continue IV fluids monitor with BMP    Disposition: ***     Team: { IP SOD TEAMS:87126}    Subjective   Patient seen and examined. No acute events overnight. Osito Carlson is a 70 year old female with Pmh of MI,  hypertension, renal stones, and CKD presenting to the med surg floor on hospital day two with admitting diagnosis of bilateral ureteral stones hydronephrosis and UTI. Patient underwent utereral stenting yesterday, currently tolerating. Urine still grossly bloody but denies any dysuria abdominal pain, nausea vomiting and diarrhea. Denies any further flank pain. Appetite adequate, able to ambulate, states she has not had a BM since admission.     Objective :{?Quick Links I ICU Summary I Vitals I I/Os I LDAs I Mobility (PT/OT) I Code Status / ACP   ?Quick Links I Active Meds I Pain Meds I Antibiotics I Anticoagulants:55443}  Temp:  [97.7 °F (36.5 °C)-98.1 °F (36.7 °C)] 98.1 °F (36.7 °C)  HR:  [52-62] 52  BP: (115-167)/(60-82) 148/82  Resp:  [16-18] 16  SpO2:  [95 %-98 %] 98 %  O2 Device: None (Room air)    I/O         05/12 0701  05/13 0700 05/13 0701  05/14 0700 05/14 0701  05/15 0700    P.O. 125 800     I.V. (mL/kg)  400 (5.3)     IV Piggyback  50     Total Intake(mL/kg) 125 (1.7) 1250 (16.5)     Urine (mL/kg/hr)  2000 (1.1)     Stool  0     Total Output  2000     Net +125 -750            Unmeasured Urine Occurrence 600 x      Unmeasured Stool Occurrence  0 x           Weights:   IBW (Ideal Body Weight): 50.1 kg    Body mass index is 30.54 kg/m².  Weight (last 2 days)       Date/Time Weight    05/12/25 1942 75.8 (167)            Physical Exam  Constitutional:       General: She is not in acute distress.     Appearance: Normal appearance. She is not ill-appearing.   HENT:      Mouth/Throat:      Mouth: Mucous membranes are moist.     Eyes:      Extraocular Movements: Extraocular movements intact.      Pupils: Pupils are equal, round, and reactive to light.       Cardiovascular:      Rate and Rhythm: Normal rate and regular rhythm.      Heart sounds: Normal heart sounds.   Pulmonary:      Effort: Pulmonary effort is normal.      Breath sounds: Normal breath sounds.   Abdominal:      General: There is no distension.      " Palpations: Abdomen is soft.      Tenderness: There is no abdominal tenderness.     Musculoskeletal:         General: Normal range of motion.     Skin:     General: Skin is warm and dry.     Neurological:      General: No focal deficit present.      Mental Status: She is alert and oriented to person, place, and time.       {?Quick Links I Lab Review I Micro Results I Radiology I Cardiology:70398}  Lab Results: I have reviewed the following results:  Recent Labs     05/14/25  0509   WBC 10.79*   HGB 12.5   HCT 38.9      SODIUM 141   K 3.9      CO2 26   BUN 16   CREATININE 0.82   GLUC 141*       {Imaging Results Review:85642::\"No pertinent imaging studies reviewed.\"}  {Other Study Results Review:78940::\"No additional pertinent studies reviewed.\"}    Currently Ordered Meds:   Current Facility-Administered Medications:   •  acetaminophen (TYLENOL) tablet 650 mg, Q6H PRN  •  aspirin chewable tablet 81 mg, Daily  •  atorvastatin (LIPITOR) tablet 80 mg, Daily With Dinner  •  cefTRIAXone (ROCEPHIN) IVPB (premix in dextrose) 1,000 mg 50 mL, Q24H, Last Rate: 1,000 mg (05/13/25 1611)  •  insulin lispro (HumALOG/ADMELOG) 100 units/mL subcutaneous injection 1-5 Units, TID AC **AND** Fingerstick Glucose (POCT), TID AC  •  metoprolol tartrate (LOPRESSOR) tablet 25 mg, Q12H SARAHY  VTE Pharmacologic Prophylaxis: {Pharmacologic VTE Prophylaxis:805384960}  VTE Mechanical Prophylaxis: {Mechanical VTE Prophylaxis:03220}    Administrative Statements   {Time Spent (Optional):33928}  Portions of the record may have been created with voice recognition software.    "

## 2025-05-14 NOTE — PROGRESS NOTES
Progress Note - Hospitalist   Name: Osito Carlson 70 y.o. female I MRN: [id number]  Unit/Bed#: -01 I Date of Admission: 5/12/2025   Date of Service: 5/14/2025 I Hospital Day: 1    Assessment & Plan  Bilateral Ureteral stone with hydronephrosis  Patient presented with right flank/lower back pain, does report prior history of kidney stones.  CT a/p: Bilateral proximal ureteral calculi measuring 5 mm cause moderate bilateral hydroureteronephrosis, as well as right perinephric fat stranding.   UA concerning for UTI with innumerable WBCs/bacteria, though only with trace leukocytes.   Urine cx: Mixed contaminants. BC x2: NGTD (24 hrs). Patient denied any s/sx of UT on admit.   S/p bilateral cystoscopy retrograde pyelogram with insertion of bilateral ureteral stents (5/13)  Urology following, will need to F/U outpatient for bilateral ureteroscopy, laser lithotripsy and stent exchange  Received 2 doses IV Rocephin, check repeat renal US -if stable/improved hopefully can switch to PO Keflex to complete total 7-day abx course  Elevated serum creatinine  Creatinine elevated 1.1 today, not quite EPG was baseline Cr closer to 0.8   Likely due to obstructive uropathy with bilateral renal stones  Cr improved, back to baseline at 0.82 & can D/C IVFs  Coronary artery disease involving native coronary artery of native heart without angina pectoris  Status post PCI in 2021  Patient no longer on Brilinta  Continue aspirin, statin  Type 2 diabetes mellitus without complication, without long-term current use of insulin (HCC)  Lab Results   Component Value Date    HGBA1C 6.7 (H) 05/05/2025     Recent Labs     05/13/25  1116 05/13/25  1546 05/14/25  0717 05/14/25  1109   POCGLU 171* 244* 147* 164*     Blood Sugar Average: Last 72 hrs:  (P) 168.5  Hold home metformin while inpatient  SSI coverage with Accu-Cheks TID AC  HTN (hypertension)  BP reviewed, currently acceptable  Continue Lopressor  Hyperlipidemia  Continue statin    VTE  Pharmacologic Prophylaxis: VTE Score: 2 Low Risk (Score 0-2) - Encourage Ambulation.    Mobility:   Basic Mobility Inpatient Raw Score: 24  JH-HLM Goal: 8: Walk 250 feet or more  JH-HLM Achieved: 1: Laying in bed  JH-HLM Goal NOT achieved. Continue with multidisciplinary rounding and encourage appropriate mobility to improve upon JH-HLM goals.    Patient Centered Rounds: I performed bedside rounds with nursing staff today.   Discussions with Specialists or Other Care Team Provider: Urology, case management    Education and Discussions with Family / Patient: Patient declined call to .     Current Length of Stay: 1 day(s)  Current Patient Status: Inpatient   Certification Statement: The patient will continue to require additional inpatient hospital stay due to IV antibiotics, clinical improvement  Discharge Plan: Anticipate discharge later today or tomorrow to home.    Code Status: Level 1 - Full Code    Subjective   Patient is seen at bedside this a.m., reports having some hematuria though no clots or hemorrhaging, denies any dysuria and has resolution of right flank pain.    Objective :  Temp:  [97.7 °F (36.5 °C)-98.1 °F (36.7 °C)] 98.1 °F (36.7 °C)  HR:  [52-62] 52  BP: (115-148)/(60-82) 148/82  Resp:  [16-18] 16  SpO2:  [95 %-98 %] 98 %  O2 Device: None (Room air)    Body mass index is 30.54 kg/m².     Input and Output Summary (last 24 hours):     Intake/Output Summary (Last 24 hours) at 5/14/2025 1352  Last data filed at 5/14/2025 0630  Gross per 24 hour   Intake 360 ml   Output 1400 ml   Net -1040 ml       Physical Exam  Constitutional:       General: She is not in acute distress.     Appearance: She is not ill-appearing, toxic-appearing or diaphoretic.     Cardiovascular:      Rate and Rhythm: Normal rate and regular rhythm.      Pulses: Normal pulses.      Heart sounds: Normal heart sounds.   Pulmonary:      Effort: Pulmonary effort is normal. No respiratory distress.      Breath sounds: Normal  breath sounds.   Abdominal:      General: There is no distension.      Palpations: Abdomen is soft.      Tenderness: There is no abdominal tenderness.     Musculoskeletal:         General: No swelling or tenderness.     Skin:     General: Skin is warm.     Neurological:      General: No focal deficit present.      Mental Status: She is alert.     Psychiatric:         Mood and Affect: Mood normal.         Behavior: Behavior normal.           Lines/Drains:  Lines/Drains/Airways       Active Status       Name Placement date Placement time Site Days    Ureteral Internal Stent Left ureter 6 Fr. 05/13/25  0815  Left ureter  1    Ureteral Internal Stent Right ureter 6 Fr. 05/13/25  0821  Right ureter  1                            Lab Results: I have reviewed the following results:   Results from last 7 days   Lab Units 05/14/25  0509 05/13/25  0442 05/12/25  1516   WBC Thousand/uL 10.79*   < > 9.63   HEMOGLOBIN g/dL 12.5   < > 13.2   HEMATOCRIT % 38.9   < > 40.2   PLATELETS Thousands/uL 208   < > 190   SEGS PCT %  --   --  77*   LYMPHO PCT %  --   --  11*   MONO PCT %  --   --  9   EOS PCT %  --   --  1    < > = values in this interval not displayed.     Results from last 7 days   Lab Units 05/14/25  0509   SODIUM mmol/L 141   POTASSIUM mmol/L 3.9   CHLORIDE mmol/L 106   CO2 mmol/L 26   BUN mg/dL 16   CREATININE mg/dL 0.82   ANION GAP mmol/L 9   CALCIUM mg/dL 9.4   GLUCOSE RANDOM mg/dL 141*         Results from last 7 days   Lab Units 05/14/25  1109 05/14/25  0717 05/13/25  1546 05/13/25  1116 05/13/25  0703 05/12/25  2036   POC GLUCOSE mg/dl 164* 147* 244* 171* 123 162*               Recent Cultures (last 7 days):   Results from last 7 days   Lab Units 05/12/25  1825 05/12/25  1610   BLOOD CULTURE  No Growth at 24 hrs.  No Growth at 24 hrs.  --    URINE CULTURE   --  20,000-29,000 cfu/ml       Imaging Results Review: No pertinent imaging studies reviewed.  Other Study Results Review: No additional pertinent studies  reviewed.    Last 24 Hours Medication List:     Current Facility-Administered Medications:     acetaminophen (TYLENOL) tablet 650 mg, Q6H PRN    aspirin chewable tablet 81 mg, Daily    atorvastatin (LIPITOR) tablet 80 mg, Daily With Dinner    cefTRIAXone (ROCEPHIN) IVPB (premix in dextrose) 1,000 mg 50 mL, Q24H, Last Rate: 1,000 mg (05/13/25 1611)    insulin lispro (HumALOG/ADMELOG) 100 units/mL subcutaneous injection 1-5 Units, TID AC **AND** Fingerstick Glucose (POCT), TID AC    metoprolol tartrate (LOPRESSOR) tablet 25 mg, Q12H SARAHY    Administrative Statements   Today, Patient Was Seen By: Yun Parnell PA-C  I have spent a total time of 35 minutes in caring for this patient on the day of the visit/encounter including Documenting in the medical record, Reviewing/placing orders in the medical record (including tests, medications, and/or procedures), and Communicating with other healthcare professionals .    **Please Note: This note may have been constructed using a voice recognition system.**

## 2025-05-14 NOTE — ASSESSMENT & PLAN NOTE
Lab Results   Component Value Date    HGBA1C 6.7 (H) 05/05/2025   Continue humalog bolus coverage of meals with TID fingerstick glucose checks    Recent Labs     05/13/25  0703 05/13/25  1116 05/13/25  1546 05/14/25  0717   POCGLU 123 171* 244* 147*       Blood Sugar Average: Last 72 hrs:  (P) 169.4

## 2025-05-14 NOTE — ASSESSMENT & PLAN NOTE
Creatinine elevated 1.1 today, not quite PEG was baseline Cr closer to 0.8   Likely due to obstructive uropathy with bilateral renal stones  Cr improved, back to baseline at 0.82 & can D/C IVFs

## 2025-05-14 NOTE — PLAN OF CARE
Problem: PAIN - ADULT  Goal: Verbalizes/displays adequate comfort level or baseline comfort level  Description: Interventions:- Encourage patient to monitor pain and request assistance- Assess pain using appropriate pain scale- Administer analgesics based on type and severity of pain and evaluate response- Implement non-pharmacological measures as appropriate and evaluate response- Consider cultural and social influences on pain and pain management- Notify physician/advanced practitioner if interventions unsuccessful or patient reports new pain  Outcome: Progressing     Problem: SAFETY ADULT  Goal: Patient will remain free of falls  Description: INTERVENTIONS:- Educate patient/family on patient safety including physical limitations- Instruct patient to call for assistance with activity - Consult OT/PT to assist with strengthening/mobility - Keep Call bell within reach- Keep bed low and locked with side rails adjusted as appropriate- Keep care items and personal belongings within reach- Initiate and maintain comfort rounds- Make Fall Risk Sign visible to staff- Offer Toileting every prn Hours, in advance of need- Initiate/Maintain n/a alarm- Obtain necessary fall risk management equipment: n/a- Apply yellow socks and bracelet for high fall risk patients- Consider moving patient to room near nurses station  Outcome: Progressing  Goal: Maintain or return to baseline ADL function  Description: INTERVENTIONS:-  Assess patient's ability to carry out ADLs; assess patient's baseline for ADL function and identify physical deficits which impact ability to perform ADLs (bathing, care of mouth/teeth, toileting, grooming, dressing, etc.)- Assess/evaluate cause of self-care deficits - Assess range of motion- Assess patient's mobility; develop plan if impaired- Assess patient's need for assistive devices and provide as appropriate- Encourage maximum independence but intervene and supervise when necessary- Involve family in  performance of ADLs- Assess for home care needs following discharge - Consider OT consult to assist with ADL evaluation and planning for discharge- Provide patient education as appropriate  Outcome: Progressing

## 2025-05-14 NOTE — ASSESSMENT & PLAN NOTE
Patient underwent ureteral stenting on 5-13, stents currently in place BMP shows no evidence for an charles, white count mildly elevated. Currently on recephin IV for UTI.  Patient asymptomatic at this time, perform repeat US of kidneys as last CT on 5-12 showed potential evidence of pyelonephritis.     Continue IV fluid repletion, check daily BMP to assess kidney function. Daily weights and Is and Os     Assuming patient US clear and patient continues to be asymptomatic, Continue Rocephin for 3 more days  transfer on to oral course of Keflex for 7 days.

## 2025-05-15 NOTE — TELEPHONE ENCOUNTER
Post Op Note     Osito Carlson is a 70 y.o. female s/p CYSTOSCOPY RETROGRADE PYELOGRAM WITH INSERTION STENT URETERAL (Bilateral: Ureter) performed  5/13/2025. Patient had surgery completed by Dr. Lewis.     How would you rate your pain on a scale from 1 to 10, 10 being the worst pain ever? Minimal pain only when waking up in the morning but is relieved once walking around  Have you had a fever? No  Have your bowel movements been regular? No; pt has taken a stool softener this morning but has not yet had a bowel movement. Advised pt that if after 3 days still no bowel movement then to utilize a laxative (Miralax) to help avoid straining. Pt understood.   Do you have any difficulty urinating? No  Do you have any other questions or concerns that I can address at this time? None at this time     Pt returned call. Pt stated that she is doing okay. Pt is having little to no pain only when wakes up in the morning does pt back hurt but then goes away once pt walks around some. Pt is not having any other issues at this time. Advised pt that our SS will be reaching out to pt to schedule pt surgery. Pt understood. Advised pt that SS name is Gini and that I will be sending her a message once we get off the phone. Pt understood. Advised pt if any other questions or concerns to give our office a call. Pt was appreciative of the call.

## 2025-05-15 NOTE — TELEPHONE ENCOUNTER
Call placed. Left  for pt to give our office a call back to see how pt is doing after surgery. Call back # provided.      Post Op Note    Osito Carlson is a 70 y.o. female s/p CYSTOSCOPY RETROGRADE PYELOGRAM WITH INSERTION STENT URETERAL (Bilateral: Ureter) performed  5/13/2025. Patient had surgery completed by Dr. Lewis.    How would you rate your pain on a scale from 1 to 10, 10 being the worst pain ever?   Have you had a fever?   If so, what was your highest temperature?   What is your current temperature?   Have your bowel movements been regular?   Do you have any difficulty urinating?   Do you have any other questions or concerns that I can address at this time?

## 2025-05-17 LAB
BACTERIA BLD CULT: NORMAL
BACTERIA BLD CULT: NORMAL

## 2025-05-19 ENCOUNTER — OFFICE VISIT (OUTPATIENT)
Dept: CARDIOLOGY CLINIC | Facility: CLINIC | Age: 70
End: 2025-05-19
Payer: MEDICARE

## 2025-05-19 VITALS
SYSTOLIC BLOOD PRESSURE: 138 MMHG | HEART RATE: 52 BPM | BODY MASS INDEX: 29.63 KG/M2 | DIASTOLIC BLOOD PRESSURE: 80 MMHG | WEIGHT: 162 LBS | OXYGEN SATURATION: 99 %

## 2025-05-19 DIAGNOSIS — E78.5 DYSLIPIDEMIA: ICD-10-CM

## 2025-05-19 DIAGNOSIS — I10 BENIGN ESSENTIAL HYPERTENSION: ICD-10-CM

## 2025-05-19 DIAGNOSIS — Z95.5 PRESENCE OF DRUG-ELUTING STENT IN LEFT CIRCUMFLEX CORONARY ARTERY: ICD-10-CM

## 2025-05-19 DIAGNOSIS — Z95.5 PRESENCE OF DRUG COATED STENT IN LAD CORONARY ARTERY: ICD-10-CM

## 2025-05-19 DIAGNOSIS — I25.10 CORONARY ARTERY DISEASE INVOLVING NATIVE CORONARY ARTERY OF NATIVE HEART WITHOUT ANGINA PECTORIS: Primary | ICD-10-CM

## 2025-05-19 PROCEDURE — 99214 OFFICE O/P EST MOD 30 MIN: CPT | Performed by: INTERNAL MEDICINE

## 2025-05-19 NOTE — TELEPHONE ENCOUNTER
Patient called stating she is to call to schedule her next procedure. Informed patient SS is working on it and she will call her once a date is available.  She will await a call back.     Patient can be reached at [phone]

## 2025-05-19 NOTE — PROGRESS NOTES
Cardiology Follow Up    Osito Carlson  1955  [id number]  [facility]  [address] [facility] [address]    [facility] [address]  Phone#  [phone]  Fax#  [phone]            1. Coronary artery disease involving native coronary artery of native heart without angina pectoris        2. Presence of drug-eluting stent in left circumflex coronary artery        3. Presence of drug coated stent in LAD coronary artery        4. Benign essential hypertension        5. Dyslipidemia          Discussion/Summary:  Ms. Carlson is a very pleasant 70-year-old female who presents to the office today for follow-up.  Since her last visit she has been feeling well.  She has offers no complaints.    Her blood pressure and it was slightly elevated upon my recheck.  She does have the capability to check it at home.  Of asked her to do so and forward readings to the office for my review in a few weeks.  No changes were made to her medication regimen.  A low-sodium diet was reinforced.    Her most recent lipids reveal an acceptable LDL on her current statin regimen.  She does have a low HDL for which therapeutic lifestyle modifications were recommended.    Given she is asymptomatic no testing is advised.    I will see her back in the office six months sooner if deemed necessary.     Interval History:  Ms. Carlson is a very pleasant 70-year-old female who presents to the office today for follow-up.      She was seen in the emergency department in the recent past due to left arm pain.  She states she lifted a heavy microwave and ever since that time has had some pain in her arm associated with numbness and hand weakness.  She is due to see orthopedic surgery tomorrow.    Since her last visit she feels well.  She has not been exercising on a regular basis.  She does do modest housework and yard work.  She denies any exertional chest pain or shortness of breath with the  activity she performs.  She denies any signs or symptoms of congestive heart failure including lower extremity edema, paroxysmal nocturnal dyspnea, orthopnea, acute weight gain or increasing abdominal girth.  She denies lightheadedness, syncope or presyncope.  She denies palpitations.  She denies symptoms of claudication.    Medical Problems                 Problem List       Obese    Nephrolithiasis    Encounter for follow-up surveillance of endometrial cancer    Breast cancer screening    History of endometrial cancer    NSTEMI (non-ST elevated myocardial infarction) (HCC)    Chest pain at rest    Anemia    Abnormal TSH    S/P drug eluting coronary stent placement                  Past Medical History:   Diagnosis Date    Chronic kidney disease     Colon polyp     Elevated troponin 09/12/2021    Hypertension     Hypertensive urgency 09/12/2021     Social History     Socioeconomic History    Marital status: /Civil Union     Spouse name: Not on file    Number of children: Not on file    Years of education: Not on file    Highest education level: Not on file   Occupational History    Not on file   Tobacco Use    Smoking status: Never    Smokeless tobacco: Never   Vaping Use    Vaping status: Never Used   Substance and Sexual Activity    Alcohol use: Never    Drug use: Never    Sexual activity: Not on file   Other Topics Concern    Not on file   Social History Narrative    Not on file     Social Drivers of Health     Financial Resource Strain: Low Risk  (12/17/2024)    Received from [facility]    Overall Financial Resource Strain (CARDIA)     Difficulty of Paying Living Expenses: Not hard at all   Food Insecurity: No Food Insecurity (5/12/2025)    Nursing - Inadequate Food Risk Classification     Worried About Running Out of Food in the Last Year: Not on file     Ran Out of Food in the Last Year: Not on file     Ran Out of Food in the Last Year: Never true   Transportation Needs: No  Transportation Needs (2025)    Nursing - Transportation Risk Classification     Lack of Transportation: Not on file     Lack of Transportation: No   Physical Activity: Not on file   Stress: No Stress Concern Present (2023)    Received from [facility]    [employer] - Occupational Stress Questionnaire     Feeling of Stress : Not at all   Social Connections: Feeling Socially Integrated (2024)    Received from [facility]    OASIS : Social Isolation     How often do you feel lonely or isolated from those around you?: Never   Intimate Partner Violence: Unknown (2025)    Nursing IPS     Feels Physically and Emotionally Safe: Not on file     Physically Hurt by Someone: Not on file     Humiliated or Emotionally Abused by Someone: Not on file     Physically Hurt by Someone: No     Hurt or Threatened by Someone: No   Housing Stability: Unknown (2025)    Nursing: Inadequate Housing Risk Classification     Has Housing: Not on file     Worried About Losing Housing: Not on file     Unable to Get Utilities: Not on file     Unable to Pay for Housing in the Last Year: No     Has Housin      Family History   Problem Relation Age of Onset    No Known Problems Mother     No Known Problems Father     No Known Problems Sister     No Known Problems Maternal Grandmother     No Known Problems Maternal Grandfather     No Known Problems Paternal Grandmother     No Known Problems Paternal Grandfather     No Known Problems Maternal Aunt     No Known Problems Maternal Aunt     No Known Problems Maternal Aunt     No Known Problems Maternal Aunt     No Known Problems Paternal Aunt     No Known Problems Son     No Known Problems Son     No Known Problems Son      Past Surgical History:   Procedure Laterality Date    CARDIAC CATHETERIZATION      COLONOSCOPY      FL RETROGRADE PYELOGRAM  2025    HYSTERECTOMY  10/23/2016    Dr. Rayne Pope     OOPHORECTOMY Bilateral     [place] CYSTO/URETERO W/LITHOTRIPSY &INDWELL STENT INSRT Left 06/01/2016    Procedure: CYSTOSCOPY; URETEROSCOPY; HOLMIUM LASER LITHOTRIPSY; BASKET STONE EXTRACTION; RETROGRADE PYELOGRAM; STENT PLACEMENT ;  Surgeon: Selvin Goodrich MD;  Location: [facility];  Service: Urology    NV CYSTO/URETERO W/LITHOTRIPSY &INDWELL STENT INSRT Left 04/22/2016    Procedure: CYSTOSCOPY, INSERTION STENT URETERAL;  Surgeon: Selvin Goodrich MD;  Location: [facility];  Service: Urology    NV CYSTOURETHROSCOPY W/URETERAL CATHETERIZATION Bilateral 5/13/2025    Procedure: CYSTOSCOPY RETROGRADE PYELOGRAM WITH INSERTION STENT URETERAL;  Surgeon: Phoenix Lewis MD;  Location: [facility];  Service: Urology    TUBAL LIGATION         Current Outpatient Medications:     aspirin 81 mg chewable tablet, Chew 1 tablet (81 mg total) daily, Disp: 30 tablet, Rfl: 0    cephalexin (KEFLEX) 500 mg capsule, Take 1 capsule (500 mg total) by mouth every 6 (six) hours for 4 days Do not start before May 15, 2025., Disp: 16 capsule, Rfl: 0    ferrous sulfate 325 (65 Fe) mg tablet, Take 325 mg by mouth, Disp: , Rfl:     metFORMIN (GLUCOPHAGE) 500 mg tablet, Take 1 tablet by mouth in the morning and 1 tablet in the evening. Take with meals., Disp: , Rfl:     metoprolol tartrate (LOPRESSOR) 25 mg tablet, TAKE 1 TABLET (25 MG TOTAL) BY MOUTH EVERY 12 (TWELVE) HOURS, Disp: 180 tablet, Rfl: 3    rosuvastatin (CRESTOR) 40 MG tablet, TAKE 1 TABLET BY MOUTH EVERY DAY, Disp: 90 tablet, Rfl: 3    Cholecalciferol 50 MCG (2000 UT) CAPS, Take 1 capsule by mouth daily, Disp: , Rfl:     nitroglycerin (NITROSTAT) 0.4 mg SL tablet, Place 1 tablet (0.4 mg total) under the tongue every 5 (five) minutes as needed for chest pain (Patient not taking: Reported on 5/12/2025), Disp: 25 tablet, Rfl: 3    polyethylene glycol (GOLYTELY) 4000 mL solution, Take 4,000 mL by mouth once for 1 dose, Disp: 4000 mL, Rfl: 0  No Known Allergies    Labs:     Chemistry       "  Component Value Date/Time    K 3.9 05/14/2025 0509    K 4.4 05/05/2025 0943     05/14/2025 0509     05/05/2025 0943    CO2 26 05/14/2025 0509    CO2 28 05/05/2025 0943    BUN 16 05/14/2025 0509    BUN 13 05/05/2025 0943    CREATININE 0.82 05/14/2025 0509    CREATININE 0.71 05/05/2025 0943        Component Value Date/Time    CALCIUM 9.4 05/14/2025 0509    CALCIUM 10.3 05/05/2025 0943    ALKPHOS 24 (L) 05/05/2025 0943    AST 17 05/05/2025 0943    ALT 17 05/05/2025 0943            No results found for: \"CHOL\"  Lab Results   Component Value Date    HDL 31 (L) 05/16/2022     Lab Results   Component Value Date    LDLCALC 47 05/16/2022     Lab Results   Component Value Date    TRIG 156 (H) 05/16/2022     No results found for: \"CHOLHDL\"    Imaging: No results found.      Review of Systems   Cardiovascular:  Negative for chest pain, claudication, cyanosis, dyspnea on exertion, leg swelling, palpitations, paroxysmal nocturnal dyspnea and syncope.   Musculoskeletal:  Positive for joint pain, muscle weakness, neck pain and stiffness.   All other systems reviewed and are negative.      Vitals:    05/19/25 1653   BP: 138/80   Pulse:    SpO2:          Vitals:    05/19/25 1627   Weight: 73.5 kg (162 lb)             Body mass index is 29.63 kg/m².    Physical Exam:  General:  Alert and cooperative, appears stated age  HEENT:  PERRLA, EOMI, no scleral icterus, no conjunctival pallor  Neck:  No lymphadenopathy, no thyromegaly, no carotid bruits, no elevated JVP  Heart:  Regular rate and rhythm, normal S1/S2, no S3/S4, no murmur  Lungs:  Clear to auscultation bilaterally   Abdomen:  Soft, non-tender, positive bowel sounds, no rebound or guarding,   no organomegaly   Extremities:  No clubbing, cyanosis or edema   Vascular:  2+ pedal pulses  Skin:  No rashes or lesions on exposed skin  Neurologic:  Cranial nerves II-XII grossly intact without focal deficits     "

## 2025-05-20 ENCOUNTER — PREP FOR PROCEDURE (OUTPATIENT)
Dept: UROLOGY | Facility: MEDICAL CENTER | Age: 70
End: 2025-05-20

## 2025-05-20 DIAGNOSIS — N20.0 CALCULUS OF KIDNEY: ICD-10-CM

## 2025-05-20 DIAGNOSIS — Z01.812 PRE-OPERATIVE LABORATORY EXAMINATION: ICD-10-CM

## 2025-05-20 DIAGNOSIS — R39.89 SUSPECTED UTI: Primary | ICD-10-CM

## 2025-05-20 NOTE — TELEPHONE ENCOUNTER
Dr. Bradshaw ok'd VIA email  @ [place] [facility] for 2nd stage Aramis #5.   -EKG done    -no blood thinners (aspirin )     Spoke with patient and confirmed surgery date of   Type of surgery: aramis #5  Operating physician:Dr. Bradshaw  Location of surgery: [place] [facility]    Verbally went over prep with patient on   NPO  Bowel prep? No  Hospital calls afternoon prior with arrival time (calls Friday afternoon for Monday surgery)  Patient needs ride to and from surgery   outpatient  Pre-op testing to be done 2 weeks prior to surgery. All testing can be done as a walk-in. EKG can only be done as a walk-in at any [facility].  LABS NEEDED:   is aware to go this week  Blood thinners:   Aspirin -    Clearances needed: None    Emailed to patient on   Copy of packet scanned into Media  Labs in packet and in electronic record   Soap prep in packet  nurse will call with post-op information

## 2025-05-24 ENCOUNTER — APPOINTMENT (OUTPATIENT)
Dept: LAB | Facility: CLINIC | Age: 70
End: 2025-05-24
Payer: MEDICARE

## 2025-05-24 DIAGNOSIS — Z01.812 PRE-OPERATIVE LABORATORY EXAMINATION: ICD-10-CM

## 2025-05-24 DIAGNOSIS — N20.0 CALCULUS OF KIDNEY: ICD-10-CM

## 2025-05-24 DIAGNOSIS — R39.89 SUSPECTED UTI: ICD-10-CM

## 2025-05-24 LAB
BACTERIA UR QL AUTO: ABNORMAL /HPF
BILIRUB UR QL STRIP: NEGATIVE
CLARITY UR: ABNORMAL
COLOR UR: ABNORMAL
GLUCOSE UR STRIP-MCNC: NEGATIVE MG/DL
HGB UR QL STRIP.AUTO: ABNORMAL
KETONES UR STRIP-MCNC: NEGATIVE MG/DL
LEUKOCYTE ESTERASE UR QL STRIP: ABNORMAL
MUCOUS THREADS UR QL AUTO: ABNORMAL
NITRITE UR QL STRIP: NEGATIVE
NON-SQ EPI CELLS URNS QL MICRO: ABNORMAL /HPF
PH UR STRIP.AUTO: 5.5 [PH]
PROT UR STRIP-MCNC: ABNORMAL MG/DL
RBC #/AREA URNS AUTO: ABNORMAL /HPF
SP GR UR STRIP.AUTO: 1.01 (ref 1–1.03)
UROBILINOGEN UR STRIP-ACNC: <2 MG/DL
WBC #/AREA URNS AUTO: ABNORMAL /HPF

## 2025-05-24 PROCEDURE — 87077 CULTURE AEROBIC IDENTIFY: CPT

## 2025-05-24 PROCEDURE — 81001 URINALYSIS AUTO W/SCOPE: CPT

## 2025-05-24 PROCEDURE — 87086 URINE CULTURE/COLONY COUNT: CPT

## 2025-05-25 LAB
BACTERIA UR CULT: ABNORMAL
BACTERIA UR CULT: NORMAL

## 2025-06-03 NOTE — PRE-PROCEDURE INSTRUCTIONS
Pre-Surgery Instructions:   Medication Instructions    aspirin 81 mg chewable tablet Take day of surgery.    ferrous sulfate 325 (65 Fe) mg tablet Hold day of surgery.    metFORMIN (GLUCOPHAGE) 500 mg tablet Hold day of surgery.    metoprolol tartrate (LOPRESSOR) 25 mg tablet Take day of surgery.    rosuvastatin (CRESTOR) 40 MG tablet Take day of surgery.   Medication instructions for day of surgery reviewed. Please take all instructed medications with only a sip of water. Please do not take any over the counter (non-prescribed) vitamins or supplements for one week prior to date of surgery.      You will receive a call one business day prior to surgery with an arrival time and hospital directions. If your surgery is scheduled on a Monday, the hospital will be calling you on the Friday prior to your surgery. If you have not heard from anyone by 8pm, please call the hospital supervisor through the hospital  at [phone]. ([place] [phone] or [place] [phone]).    Do not eat or drink anything after midnight the night before your surgery, including candy, mints, lifesavers, or chewing gum. Do not drink alcohol 24hrs before your surgery. Try not to smoke at least 24hrs before your surgery.       Follow the pre surgery showering instructions as listed in the “My Surgical Experience Booklet” or otherwise provided by your surgeon's office. Do not use a blade to shave the surgical area 1 week before surgery. It is okay to use a clean electric clippers up to 24 hours before surgery. Do not apply any lotions, creams, including makeup, cologne, deodorant, or perfumes after showering on the day of your surgery. Do not use dry shampoo, hair spray, hair gel, or any type of hair products.     No contact lenses, eye make-up, or artificial eyelashes. Remove nail polish, including gel polish, and any artificial, gel, or acrylic nails if possible. Remove all jewelry including rings and body piercing jewelry.      Wear causal clothing that is easy to take on and off. Consider your type of surgery.    Keep any valuables, jewelry, piercings at home. Please bring any specially ordered equipment (sling, braces) if indicated.    Arrange for a responsible person to drive you to and from the hospital on the day of your surgery. Please confirm the visitor policy for the day of your procedure when you receive your phone call with an arrival time.     Call the surgeon's office with any new illnesses, exposures, or additional questions prior to surgery.    Please reference your “My Surgical Experience Booklet” for additional information to prepare for your upcoming surgery.

## 2025-06-04 ENCOUNTER — ANESTHESIA EVENT (OUTPATIENT)
Dept: PERIOP | Facility: HOSPITAL | Age: 70
End: 2025-06-04
Payer: MEDICARE

## 2025-06-05 ENCOUNTER — HOSPITAL ENCOUNTER (OUTPATIENT)
Facility: HOSPITAL | Age: 70
Setting detail: OUTPATIENT SURGERY
Discharge: HOME | End: 2025-06-05
Attending: UROLOGY | Admitting: UROLOGY
Payer: MEDICARE

## 2025-06-05 ENCOUNTER — ANESTHESIA (OUTPATIENT)
Dept: PERIOP | Facility: HOSPITAL | Age: 70
End: 2025-06-05
Payer: MEDICARE

## 2025-06-05 ENCOUNTER — APPOINTMENT (OUTPATIENT)
Dept: RADIOLOGY | Facility: HOSPITAL | Age: 70
End: 2025-06-05
Payer: MEDICARE

## 2025-06-05 VITALS
DIASTOLIC BLOOD PRESSURE: 83 MMHG | HEIGHT: 62 IN | HEART RATE: 66 BPM | WEIGHT: 162 LBS | BODY MASS INDEX: 29.81 KG/M2 | TEMPERATURE: 97.5 F | OXYGEN SATURATION: 94 % | SYSTOLIC BLOOD PRESSURE: 148 MMHG | RESPIRATION RATE: 18 BRPM

## 2025-06-05 DIAGNOSIS — N20.1 URETERAL STONE: Primary | ICD-10-CM

## 2025-06-05 LAB — GLUCOSE SERPL-MCNC: 119 MG/DL (ref 65–140)

## 2025-06-05 PROCEDURE — 74420 UROGRAPHY RTRGR +-KUB: CPT

## 2025-06-05 PROCEDURE — C1747 URETEROSCOPE DIGITAL FLEX SNGL USE RVS DEFLECTION APTRA: HCPCS | Performed by: UROLOGY

## 2025-06-05 PROCEDURE — 82948 REAGENT STRIP/BLOOD GLUCOSE: CPT

## 2025-06-05 PROCEDURE — C1758 CATHETER, URETERAL: HCPCS | Performed by: UROLOGY

## 2025-06-05 PROCEDURE — 52356 CYSTO/URETERO W/LITHOTRIPSY: CPT | Performed by: UROLOGY

## 2025-06-05 PROCEDURE — C1894 INTRO/SHEATH, NON-LASER: HCPCS | Performed by: UROLOGY

## 2025-06-05 PROCEDURE — C2625 STENT, NON-COR, TEM W/DEL SY: HCPCS | Performed by: UROLOGY

## 2025-06-05 PROCEDURE — 82360 CALCULUS ASSAY QUANT: CPT | Performed by: UROLOGY

## 2025-06-05 PROCEDURE — C1769 GUIDE WIRE: HCPCS | Performed by: UROLOGY

## 2025-06-05 PROCEDURE — NC001 PR NO CHARGE: Performed by: UROLOGY

## 2025-06-05 DEVICE — STENT URETERAL 6FR 22CM INLAY OPTIMA W/NITINOL GDWR: Type: IMPLANTABLE DEVICE | Site: URETER | Status: FUNCTIONAL

## 2025-06-05 RX ORDER — FENTANYL CITRATE/PF 50 MCG/ML
25 SYRINGE (ML) INJECTION
Status: DISCONTINUED | OUTPATIENT
Start: 2025-06-05 | End: 2025-06-05 | Stop reason: HOSPADM

## 2025-06-05 RX ORDER — DEXAMETHASONE SODIUM PHOSPHATE 10 MG/ML
INJECTION, SOLUTION INTRAMUSCULAR; INTRAVENOUS AS NEEDED
Status: DISCONTINUED | OUTPATIENT
Start: 2025-06-05 | End: 2025-06-05

## 2025-06-05 RX ORDER — GLYCOPYRROLATE 0.2 MG/ML
INJECTION INTRAMUSCULAR; INTRAVENOUS AS NEEDED
Status: DISCONTINUED | OUTPATIENT
Start: 2025-06-05 | End: 2025-06-05

## 2025-06-05 RX ORDER — CEFAZOLIN SODIUM 2 G/50ML
2000 SOLUTION INTRAVENOUS ONCE
Status: DISCONTINUED | OUTPATIENT
Start: 2025-06-05 | End: 2025-06-05 | Stop reason: HOSPADM

## 2025-06-05 RX ORDER — PROPOFOL 10 MG/ML
INJECTION, EMULSION INTRAVENOUS AS NEEDED
Status: DISCONTINUED | OUTPATIENT
Start: 2025-06-05 | End: 2025-06-05

## 2025-06-05 RX ORDER — CEFAZOLIN SODIUM 1 G/3ML
INJECTION, POWDER, FOR SOLUTION INTRAMUSCULAR; INTRAVENOUS AS NEEDED
Status: DISCONTINUED | OUTPATIENT
Start: 2025-06-05 | End: 2025-06-05

## 2025-06-05 RX ORDER — SODIUM CHLORIDE, SODIUM LACTATE, POTASSIUM CHLORIDE, CALCIUM CHLORIDE 600; 310; 30; 20 MG/100ML; MG/100ML; MG/100ML; MG/100ML
INJECTION, SOLUTION INTRAVENOUS CONTINUOUS PRN
Status: DISCONTINUED | OUTPATIENT
Start: 2025-06-05 | End: 2025-06-05

## 2025-06-05 RX ORDER — OXYBUTYNIN CHLORIDE 5 MG/1
5 TABLET ORAL 3 TIMES DAILY PRN
Qty: 30 TABLET | Refills: 0 | Status: SHIPPED | OUTPATIENT
Start: 2025-06-05 | End: 2025-06-15

## 2025-06-05 RX ORDER — MAGNESIUM HYDROXIDE 1200 MG/15ML
LIQUID ORAL AS NEEDED
Status: DISCONTINUED | OUTPATIENT
Start: 2025-06-05 | End: 2025-06-05 | Stop reason: HOSPADM

## 2025-06-05 RX ORDER — ONDANSETRON 2 MG/ML
4 INJECTION INTRAMUSCULAR; INTRAVENOUS ONCE AS NEEDED
Status: DISCONTINUED | OUTPATIENT
Start: 2025-06-05 | End: 2025-06-05 | Stop reason: HOSPADM

## 2025-06-05 RX ORDER — CEFDINIR 300 MG/1
300 CAPSULE ORAL EVERY 12 HOURS SCHEDULED
Qty: 6 CAPSULE | Refills: 0 | Status: SHIPPED | OUTPATIENT
Start: 2025-06-05 | End: 2025-06-08

## 2025-06-05 RX ORDER — MIDAZOLAM HYDROCHLORIDE 2 MG/2ML
INJECTION, SOLUTION INTRAMUSCULAR; INTRAVENOUS AS NEEDED
Status: DISCONTINUED | OUTPATIENT
Start: 2025-06-05 | End: 2025-06-05

## 2025-06-05 RX ORDER — OXYCODONE HYDROCHLORIDE 5 MG/1
5 TABLET ORAL EVERY 4 HOURS PRN
Refills: 0 | Status: DISCONTINUED | OUTPATIENT
Start: 2025-06-05 | End: 2025-06-05 | Stop reason: HOSPADM

## 2025-06-05 RX ORDER — ONDANSETRON 2 MG/ML
INJECTION INTRAMUSCULAR; INTRAVENOUS AS NEEDED
Status: DISCONTINUED | OUTPATIENT
Start: 2025-06-05 | End: 2025-06-05

## 2025-06-05 RX ORDER — FENTANYL CITRATE 50 UG/ML
INJECTION, SOLUTION INTRAMUSCULAR; INTRAVENOUS AS NEEDED
Status: DISCONTINUED | OUTPATIENT
Start: 2025-06-05 | End: 2025-06-05

## 2025-06-05 RX ORDER — HYDROMORPHONE HCL/PF 1 MG/ML
0.25 SYRINGE (ML) INJECTION
Status: DISCONTINUED | OUTPATIENT
Start: 2025-06-05 | End: 2025-06-05 | Stop reason: HOSPADM

## 2025-06-05 RX ORDER — LIDOCAINE HYDROCHLORIDE 20 MG/ML
INJECTION, SOLUTION EPIDURAL; INFILTRATION; INTRACAUDAL; PERINEURAL AS NEEDED
Status: DISCONTINUED | OUTPATIENT
Start: 2025-06-05 | End: 2025-06-05

## 2025-06-05 RX ORDER — PHENAZOPYRIDINE HYDROCHLORIDE 200 MG/1
200 TABLET, FILM COATED ORAL
Qty: 6 TABLET | Refills: 0 | Status: SHIPPED | OUTPATIENT
Start: 2025-06-05 | End: 2025-06-07

## 2025-06-05 RX ADMIN — SODIUM CHLORIDE, SODIUM LACTATE, POTASSIUM CHLORIDE, AND CALCIUM CHLORIDE: .6; .31; .03; .02 INJECTION, SOLUTION INTRAVENOUS at 10:00

## 2025-06-05 RX ADMIN — FENTANYL CITRATE 25 MCG: 50 INJECTION, SOLUTION INTRAMUSCULAR; INTRAVENOUS at 09:25

## 2025-06-05 RX ADMIN — LIDOCAINE HYDROCHLORIDE 100 MG: 20 INJECTION, SOLUTION EPIDURAL; INFILTRATION; INTRACAUDAL; PERINEURAL at 09:07

## 2025-06-05 RX ADMIN — DEXAMETHASONE SODIUM PHOSPHATE 10 MG: 10 INJECTION, SOLUTION INTRAMUSCULAR; INTRAVENOUS at 09:07

## 2025-06-05 RX ADMIN — MIDAZOLAM HYDROCHLORIDE 2 MG: 1 INJECTION, SOLUTION INTRAMUSCULAR; INTRAVENOUS at 08:59

## 2025-06-05 RX ADMIN — SODIUM CHLORIDE, SODIUM LACTATE, POTASSIUM CHLORIDE, AND CALCIUM CHLORIDE: .6; .31; .03; .02 INJECTION, SOLUTION INTRAVENOUS at 07:47

## 2025-06-05 RX ADMIN — GLYCOPYRROLATE 0.2 MG: 0.2 INJECTION, SOLUTION INTRAMUSCULAR; INTRAVENOUS at 08:59

## 2025-06-05 RX ADMIN — CEFAZOLIN 2000 MG: 1 INJECTION, POWDER, FOR SOLUTION INTRAMUSCULAR; INTRAVENOUS at 09:01

## 2025-06-05 RX ADMIN — FENTANYL CITRATE 25 MCG: 50 INJECTION, SOLUTION INTRAMUSCULAR; INTRAVENOUS at 09:14

## 2025-06-05 RX ADMIN — FENTANYL CITRATE 25 MCG: 50 INJECTION, SOLUTION INTRAMUSCULAR; INTRAVENOUS at 10:00

## 2025-06-05 RX ADMIN — CEFAZOLIN 2000 MG: 1 INJECTION, POWDER, FOR SOLUTION INTRAMUSCULAR; INTRAVENOUS at 09:03

## 2025-06-05 RX ADMIN — PROPOFOL 150 MG: 10 INJECTION, EMULSION INTRAVENOUS at 09:07

## 2025-06-05 RX ADMIN — FENTANYL CITRATE 25 MCG: 50 INJECTION, SOLUTION INTRAMUSCULAR; INTRAVENOUS at 10:12

## 2025-06-05 RX ADMIN — ONDANSETRON 4 MG: 2 INJECTION INTRAMUSCULAR; INTRAVENOUS at 09:06

## 2025-06-05 NOTE — ANESTHESIA POSTPROCEDURE EVALUATION
Post-Op Assessment Note    CV Status:  Stable  Pain Score: 0    Pain management: adequate       Mental Status:  Arousable and sleepy   Hydration Status:  Stable   PONV Controlled:  Controlled   Airway Patency:  Patent     Post Op Vitals Reviewed: Yes    No anethesia notable event occurred.    Staff: CRNA           Last Filed PACU Vitals:  Vitals Value Taken Time   Temp 98    Pulse 65 06/05/25 10:27   /68    Resp 9 06/05/25 10:27   SpO2 99 % 06/05/25 10:27   Vitals shown include unfiled device data.

## 2025-06-05 NOTE — ANESTHESIA POSTPROCEDURE EVALUATION
Post-Op Assessment Note    CV Status:  Stable  Pain Score: 0    Pain management: adequate       Mental Status:  Arousable and sleepy   Hydration Status:  Stable   PONV Controlled:  Controlled   Airway Patency:  Patent     Post Op Vitals Reviewed: Yes    No anethesia notable event occurred.    Staff: CRNA           Last Filed PACU Vitals:  Vitals Value Taken Time   Temp 98    Pulse 65 06/05/25 10:27   /68    Resp 9 06/05/25 10:27   SpO2 99 % 06/05/25 10:27   Vitals shown include unfiled device data.    Modified Michelle:     Vitals Value Taken Time   Activity 2 06/05/25 10:45   Respiration 2 06/05/25 10:45   Circulation 2 06/05/25 10:45   Consciousness 2 06/05/25 10:45   Oxygen Saturation 2 06/05/25 10:45     Modified Michelle Score: 10

## 2025-06-05 NOTE — DISCHARGE INSTR - AVS FIRST PAGE
Osito Carlson:    Your surgery went well but your kidney tubes (ureters) were very inflamed from the stones on each side.  Your stones were fragmented to small pieces and subsequent fragments extracted via basket such that a residual stone should be small like grains of sand that can pass on their own.    We will remove your stents in clinic via cystoscopy in 2 weeks.  This is a quick outpatient procedure for which you are awake and has mild discomfort.    Please plan to take an antibiotic around the stent removal starting the day before, the day of and finishing the day after stent removal.    It is important to ensure you have healed well from surgery and therefore we will plan for an abdominal X-ray and kidney ultrasound approximately 4-6 weeks after the stent is removed.    Please take your medications as prescribed with caution for comfort.  Most importantly please drink 6-8 glasses of water per day    Please call with any questions or concerns.    Kumar Bradshaw MD  [facility]  [phone]            WHAT IS A STENT?  At the end of the procedure, your doctor may place a stent into your ureter. A stent is a thin, flexible piece of plastic that will hold open your ureter while the remaining small pieces of stone pass. This allows your kidney to drain easily and prevents you from having to “pass” these small stone pieces on your own, which could be painful. The stent is about 12 inches long and looks and feels like a thin piece of spaghetti.    AFTER THE PROCEDURE  After the procedure you may experience the following symptoms. All of these are normal and should resolve within 1 or 2 days after your stent is removed.  Urinary frequency (urinating more often than usual)  Urinary urgency (the sensation that you need to urinate right away)  Painful urination (this can be pain in your bladder or in your back when  you urinate)  Blood in your urine ( a stent can irritate the lining of your bladder  causing it to bleed)  Back/Flank pain, especially with urination    You may receive a prescription for narcotic pain medication after the procedure. You will also receive a prescription for tamsulosin which you will take once a day for 2 weeks to help relax your ureter and decrease stent discomfort. You will also need to purchase a stool softener (i.e. Colace) or mild laxative (i.e. Miralax) as the narcotic pain medication can make you constipated. This is important as constipation can exacerbate stent related symptoms.     STENT REMOVAL  In some cases, your doctor will leave strings attached to your stent. The strings will be taped to your skin after the procedure. The strings will allow you to remove the thin flexible stent while you are at home. Normally, the stent can be removed 3-5 days after your procedure; your physician will tell you the specific date after your procedure.     On the day you are supposed to remove your stent, do the following:  When you wake up in the morning, take 1-2 pain pills with food.  Start your antibiotic pill the morning of schedule stent removal if prescribed  One hour later sit on the toilet or in the bath tub.  Take a deep breath in and while exhaling, pull the string.   Dispose of the stent in the garbage.    Alternatively, you will come back for an office procedure to remove the stent by placing a small camera into your bladder to remove the stent.    During the next 4-8 hours after removing your stent, you may experience additional blood in your urine, pain with urination or back/side pain. You should take the pain medication you were prescribed to help you with the pain, as well as continue the Flomax. If the pain is severe, you are vomiting, and/or have a fever > 101.4 please call the clinic.

## 2025-06-05 NOTE — H&P
UROLOGY HISTORY AND PHYSICAL     Patient Identifiers: Osito Carlson (MRN [id number])      Date of Service: 6/5/2025        ASSESSMENT:     70 y.o. old female with bilateral ureteral stone status post bilateral stent placement May 13, 2025.  He is here now for definitive b/l ureteroscopy.  Denies UTI symptoms.      PLAN:     Bilateral ureteroscopy with laser lithotripsy.    We discussed the risks of ureteroscopy to include inability to reach or treat all of the targeted stone, bleeding, infection and damage to the ureter which could result in a urine leak or stricture which in turn could require additional surgery, lifelong stents, and even the potential for loss of the kidney.      History of Present Illness:     Osito Carlson is a 70 y.o. old to hospital with right-sided flank pain on May 12, 2025 with CT scan showing bilateral ureteral stones associated hydronephrosis without fevers or chills.  Urinalysis showed white blood cells and bacteria so she was taken to the OR for stent placement.  Urine culture later returned negative.  She is now here for definitive stone treatment.  Preoperative urine culture was less than 10K Enterococcus.  Patient denies UTI symptoms    Past Medical, Past Surgical History:   Past Medical History[1]:    Past Surgical History[2]:    Medications, Allergies:   Current Medications[3]    Allergies:  Allergies[4]:    Social and Family History:   Social History:   Social History[5].    Tobacco Use History[6]    Family History:  Family History[7]:     Review of Systems:     General: Fever, chills, or night sweats: negative  Cardiac: Negative for chest pain.    Pulmonary: Negative for shortness of breath.  Gastrointestinal: Abdominal pain negative  Nausea, vomiting, or diarrhea negative  Genitourinary: See HPI above.  Patient does nothave hematuria.  All other systems queried were negative.    Physical Exam:   General: Patient is pleasant and in NAD. Awake and alert  /62   Pulse 57   Temp  "(!) 96 °F (35.6 °C) (Temporal)   Resp 16   Ht 5' 2\" (1.575 m)   Wt 73.5 kg (162 lb)   SpO2 98%   BMI 29.63 kg/m²   HEENT:  Normocephalic atraumatic  Cardiac:  Regular rate and rhythm, Peripheral edema: negative  Pulmonary: Non-labored breathing, CTAB  Abdomen: Soft, non-tender, non-distended.  No surgical scars.  No masses, tenderness, hernias noted.    Genitourinary: negative CVA tenderness, neg suprapubic tenderness.  Extremities: normal movement in all 4       Labs:     Lab Results   Component Value Date    HGB 12.5 05/14/2025    HCT 38.9 05/14/2025    WBC 10.79 (H) 05/14/2025     05/14/2025   ]    Lab Results   Component Value Date    K 3.9 05/14/2025     05/14/2025    CO2 26 05/14/2025    BUN 16 05/14/2025    CREATININE 0.82 05/14/2025    CALCIUM 9.4 05/14/2025   ]    Imaging:   I personally reviewed the images and report of the following studies, and reviewed them with the patient:    May 14 2025 renal bladder ultrasound showing moderate left-sided hydronephrosis with partially visualized stent with 8 mm stone present.    May 12, 2025 CT scan showing bilateral ureteral stones 5 mm on the left and a 5 mm on the right in the proximal ureters.    Thank you for allowing me to participate in this patients’ care.  Please do not hesitate to call with any additional questions.  Kumar Bradshaw MD           [1]   Past Medical History:  Diagnosis Date    Chronic kidney disease     Colon polyp     Diabetes mellitus (HCC)     Elevated troponin 09/12/2021    Hypertension     Hypertensive urgency 09/12/2021    Kidney stone     Myocardial infarction (HCC)    [2]   Past Surgical History:  Procedure Laterality Date    CARDIAC CATHETERIZATION      COLONOSCOPY      FL RETROGRADE PYELOGRAM  5/13/2025    HYSTERECTOMY  10/23/2016    Dr. Rayne Pope    OOPHORECTOMY Bilateral     SC CYSTO/URETERO W/LITHOTRIPSY &INDWELL STENT INSRT Left 06/01/2016    Procedure: CYSTOSCOPY; URETEROSCOPY; HOLMIUM LASER LITHOTRIPSY; " BASKET STONE EXTRACTION; RETROGRADE PYELOGRAM; STENT PLACEMENT ;  Surgeon: Selvin Goodrich MD;  Location: [facility];  Service: Urology    CO CYSTO/URETERO W/LITHOTRIPSY &INDWELL STENT INSRT Left 04/22/2016    Procedure: CYSTOSCOPY, INSERTION STENT URETERAL;  Surgeon: Selvin Goodrich MD;  Location: [facility];  Service: Urology    CO CYSTOURETHROSCOPY W/URETERAL CATHETERIZATION Bilateral 5/13/2025    Procedure: CYSTOSCOPY RETROGRADE PYELOGRAM WITH INSERTION STENT URETERAL;  Surgeon: Phoenix Lewis MD;  Location: [facility];  Service: Urology    TUBAL LIGATION     [3]   Current Facility-Administered Medications:     ceFAZolin (ANCEF) IVPB (premix in dextrose) 2,000 mg 50 mL, 2,000 mg, Intravenous, Once, Kumar Bradshaw MD    Facility-Administered Medications Ordered in Other Encounters:     lactated ringers infusion, , Intravenous, Continuous PRN, Eloina Carbajal, CRNA, New Bag at 06/05/25 0747  [4] No Known Allergies  [5]   Social History  Tobacco Use    Smoking status: Never    Smokeless tobacco: Never   Vaping Use    Vaping status: Never Used   Substance Use Topics    Alcohol use: Never    Drug use: Never   [6]   Social History  Tobacco Use   Smoking Status Never   Smokeless Tobacco Never   [7]   Family History  Problem Relation Name Age of Onset    No Known Problems Mother      Heart disease Father      No Known Problems Sister      No Known Problems Son      No Known Problems Son      No Known Problems Son      No Known Problems Maternal Grandmother      No Known Problems Maternal Grandfather      No Known Problems Paternal Grandmother      No Known Problems Paternal Grandfather      No Known Problems Maternal Aunt      No Known Problems Maternal Aunt      No Known Problems Maternal Aunt      No Known Problems Maternal Aunt      No Known Problems Paternal Aunt

## 2025-06-05 NOTE — TREATMENT PLAN
Patient underwent a bilateral ureteroscopy and laser lithotripsy of bilateral ureteral stones as well as left renal stone.  There was significant edema and induration of the ureters where the stones were impacted.  Plan for stents x 2 weeks and then KUB and ultrasound 4 to 6 weeks later.      Urology team: Please arrange cystoscopy and stent  x2 removal in 2 weeks.Then pt should follow-up with a KUB and ultrasound 4 to 6 weeks later.

## 2025-06-05 NOTE — ANESTHESIA PREPROCEDURE EVALUATION
Procedure:  CYSTOSCOPY URETEROSCOPY WITH LITHOTRIPSY HOLMIUM LASER, RETROGRADE PYELOGRAM AND INSERTION STENT URETERAL (Bilateral: Bladder)    Relevant Problems   CARDIO   (+) Benign essential hypertension   (+) Chest pain at rest   (+) Coronary artery disease involving native coronary artery of native heart without angina pectoris   (+) History of PTCA with stent   (+) Hyperlipidemia   (+) MI (myocardial infarction) (HCC)   (+) Presence of drug coated stent in LAD coronary artery   (+) Presence of drug-eluting stent in left circumflex coronary artery      ENDO   (+) Type 2 diabetes mellitus without complication, without long-term current use of insulin (HCC)      /RENAL   (+) Bilateral Ureteral stone with hydronephrosis   (+) Nephrolithiasis      HEMATOLOGY   (+) Anemia        Physical Exam    Airway     Mallampati score: III  TM Distance: >3 FB  Neck ROM: full  Upper bite lip test: I  Mouth opening: >= 4 cm      Cardiovascular  Rhythm: regular, Rate: normal, Pulse is palpable. Cardiovascular exam normal    Dental   No notable dental hx     Pulmonary  Pulmonary exam normal Breath sounds clear to auscultation    Neurological    She appears awake, alert and oriented x3.      Other Findings  post-pubertal.      Anesthesia Plan  ASA Score- 2     Anesthesia Type- general with ASA Monitors.         Additional Monitors:     Airway Plan: LMA and LMA.           Plan Factors-Exercise tolerance (METS): >4 METS.    Chart reviewed. EKG reviewed. Imaging results reviewed. Existing labs reviewed. Patient summary reviewed.    Patient is not a current smoker.  Patient instructed to abstain from smoking on day of procedure. Patient did not smoke on day of surgery.    Obstructive sleep apnea risk education given perioperatively.        Induction- intravenous.    Postoperative Plan- Plan for postoperative opioid use.   Monitoring Plan - Monitoring plan - standard ASA monitoring  Post Operative Pain Plan - non-opiod analgesics, plan  for postoperative opioid use and multimodal analgesia    Perioperative Resuscitation Plan - Level 1 - Full Code.       Informed Consent- Anesthetic plan and risks discussed with patient and spouse.  I personally reviewed this patient with the CRNA. Discussed and agreed on the Anesthesia Plan with the CRNA..      NPO Status:  Vitals Value Taken Time   Date of last liquid 06/04/25 06/05/25 07:44   Time of last liquid 1730 06/05/25 07:44   Date of last solid 06/04/25 06/05/25 07:44   Time of last solid 1730 06/05/25 07:44

## 2025-06-05 NOTE — OP NOTE
OPERATIVE REPORT  PATIENT NAME: Osito Carlson    :  1955  MRN: [id number]  Pt Location: [facility] ROOM 03    SURGERY DATE: 2025    Surgeons and Role:     * Kumar Bradshaw MD - Primary    Preop Diagnosis:  Left ureteral stone  Left renal stone  Right ureteral stone    Postop diagnosis  Left ureteral stone  Left renal stone  Right ureteral stone  Significant edema of right>left ureters from stone impaction    Post-Op Diagnosis Codes:     * Ureteral stone [N20.1]    Procedure(s):  Bilateral - CYSTOSCOPY; REMOVAL BILATERAL URETERAL STENTS; RETROGRADE PYELOGRAM; URETEROSCOPY;  LITHOTRIPSY HOLMIUM LASER AND BILATERAL STONE EXTRACTION;AND INSERTION STENT URETERAL BILATERAL    Specimen(s):  ID Type Source Tests Collected by Time Destination   A : bilateral ureteral calculi Calculus Ureter, Left STONE ANALYSIS Kumar Bradshaw MD 2025 0930        Estimated Blood Loss:   Minimal    Drains:  Ureteral Internal Stent Left ureter 6 Fr. (Active)   Number of days: 0       Ureteral Internal Stent Right ureter 6 Fr. (Active)   Number of days: 0       [REMOVED] Ureteral Internal Stent Left ureter 6 Fr. (Removed)   Number of days: 23       [REMOVED] Ureteral Internal Stent Right ureter 6 Fr. (Removed)   Number of days: 23       Anesthesia Type:   General    Operative Indications:  Patient presented to hospital with bilateral ureteral stones resulting in bilateral stent placements on May 12, 2025.  CT scan also showed 8 mm stone in the left collecting system.  She now presents for definitive stone treatment.    Operative Findings:  Thickened edema and impaction of stones bilaterally in the proximal ureters.  Initial wire passage on the right took submucosal route around impacted stone, recognized and redirected.  Bilateral ureteral stones were carefully fragmented in setting of impaction and basket extracted.  Left renal stones mid upper pole calyx starting to emerge were fragmented and basket extracted.  Aptra one-time use  flexible ureteroscope utilized as this was the only flexible uteroscope available       Complications:   None    Procedure and Technique:    After informed consent including the risks of bleeding, infection, ureteral injury, and need for secondary procedures, patient was placed supine in the operating room theater.  Gen. anesthesia was administered.      They were then placed in the dorsal lithotomy position and sterilely prepped and draped in usual fashion. Antibiotic prophylaxis and DVT prophylaxis were administered Cystoscopy was performed the 21 Cymro cystoscope 30° lens.  This revealed a normal urethra.  Inspection of the bladder revealed no abnormalities.  Fluoroscopy showed bilateral stents in appropriate position.    Attention was turned to the left ureteral orifice. A 5fr open ended catheter was attempted to be passed into the ureteral orifice alongside the stent and a 0.38 solo guidewire was passed through the open ended catheter and up the ureter into the renal pelvis. The scope was removed and reintroduced and the stent was grasped and removed.    A semirigid ureteroscope was passed into ureter alongside the wire.  It was passed up to the proximal ureter where significant bullous edema was seen associated with a impacted spiculated stone.  Only a small portion of the stone service could be seen as the rest was covered by inflammatory ureteral tissue.  The stone was carefully fragmented using a 270 µm laser fiber at settings of 0.2 J and 40 Hz.  We let pieces passed down the ureter as they were fragmented.  Once the stone was fragmented to smaller pieces a basket used to carefully extract.  Some pieces were difficult to extract as they were embedded into the wall.  We were then able to pass the ureteroscope up into the collecting system.  As the patient had a known renal stone we placed a second wire into the collecting system.    An 10/12 x35cm access sheath was then sequentially placed (inner sheath  and then inner + outer sheath) over one of the wires under fluoroscopic guidance with minimal resistance.    An Aptra one time use flexible ureteroscope was then passed through the access sheath.  The collecting system was carefully explored.  There was some mild bleeding appreciated.  In a interpolar calyx a cluster of stones was seen emanating from behind a thin layer of calyceal mucosal tissue.  This was consistent with what was seen on CT scan.  The stones were unroofed and fragmented using 270 µm fiber and laser settings 0.2J and 40 Hz. Sequential passes were then made with a 1.9fr zero tip wire basket in order to retrieve stone debris.   The collecting system was examined again and no significant residual stone fragments were appreciated.  Hemostasis was appropriate.       The ureteroscope was backed down the ureter under vision and there were no residual fragments and the ureter was noted to be intact with no injury but with significant edema where the stone had been located.       A retrograde was performed from mid and distal ureter and did not show extravasation of contrast.    Cystoscope was reassembled over the guidewire and a 6 x 22 double-J stent inserted without difficulty with good coil seen in left collecting system on fluoroscopy and visually in the bladder.  The string was not left on.     We now turned our attention to the right side.  A 5fr open ended catheter was attempted to be passed into the ureteral orifice alongside the stent and a 0.38 solo guidewire was passed through the open ended catheter and up the ureter into the renal pelvis. The scope was removed and reintroduced and the right stent was grasped and removed.    A semirigid ureteroscope was passed into ureter alongside the wire.  It was passed up to the proximal ureter where again stone was associated with significant bullous edema such that only a fraction of the stone surface could be seen as the rest was covered by inflammatory  ureteral tissue.  The stone was carefully fragmented using a 270 µm laser fiber at settings of 0.2 J and 40 Hz.  We let pieces passed down the ureter as they were fragmented.  Once the stone was fragmented to smaller pieces we utilized a basket used to carefully extract.  With this we appreciated that the original wire passage had taken a submucosal route around the area of inflamed stone and therefore was pulled back and reintroduced through the true ureteral lumen into the collecting system.  We passed the ureteroscope up into the collecting system.  The patient did not have renal stones on CT scan.  Therefore at this point area where the back of stone had been located was associated with significant inflammation.    A 6 x 22 stent was passed over the wire under fluoroscopic guidance and good coil was seen in the collecting system.    The bladder was drained.   The patient was placed back supine, awakened from general anesthesia and brought to recovery room in stable condition.     A qualified resident physician was not available.    Patient Disposition:  PACU     Plan: Bilateral stent removal in 2 weeks which should give time for the inflamed areas of the ureter to heal now that stones have been addressed.  Follow-up with KUB and ultrasound 4 to 6 weeks later.     SIGNATURE: Kumar Bradshaw MD  DATE: June 5, 2025  TIME: 10:44 AM

## 2025-06-06 ENCOUNTER — TELEPHONE (OUTPATIENT)
Dept: UROLOGY | Facility: CLINIC | Age: 70
End: 2025-06-06

## 2025-06-06 NOTE — TELEPHONE ENCOUNTER
Post Op Note    Osito Carlson is a 70 y.o. female s/p  CYSTOSCOPY; REMOVAL BILATERAL URETERAL STENTS; RETROGRADE PYELOGRAM; URETEROSCOPY;  LITHOTRIPSY HOLMIUM LASER AND BILATERAL STONE EXTRACTION;AND INSERTION STENT URETERAL BILATERAL (Bilateral: Bladder)  performed 06/05/2025.  Osito Carlson is a patient of Dr. Dr. Bradshaw and is seen at the [place] office.     How would you rate your pain on a scale from 1 to 10, 10 being the worst pain ever? 0  Have you had a fever? No  Do you have any difficulty urinating? No  Do you have any other questions or concerns that I can address at this time?   PO expectations discussed  Advised to call office with any ss of infection, inability to urinate or blood in urine that looks like dark red wine with clots.   Medications discussed.   Appointments confirmed.  All questions answered. Advised to call office with any questions or concerns. Pt verbalized understanding.            Treatment Plan by Kumar Bradshaw MD at 6/5/2025 11:34 AM    Author: Kumar Bradshaw MD Service: Urology Author Type: Physician   Filed: 6/5/2025 11:35 AM Date of Service: 6/5/2025 11:34 AM Note Type: Treatment Plan   Status: Addendum : Kumar Bradshaw MD (Physician)   Related Notes: Original Note by Kumar Bradshaw MD (Physician) filed at 6/5/2025 11:34 AM   Patient underwent a bilateral ureteroscopy and laser lithotripsy of bilateral ureteral stones as well as left renal stone.  There was significant edema and induration of the ureters where the stones were impacted.  Plan for stents x 2 weeks and then KUB and ultrasound 4 to 6 weeks later.        Urology team: Please arrange cystoscopy and stent  x2 removal in 2 weeks.Then pt should follow-up with a KUB and ultrasound 4 to 6 weeks late

## 2025-06-13 LAB
CALCIUM OXALATE DIHYDRATE MFR STONE IR: 80 %
COLOR STONE: NORMAL
COM MFR STONE: 10 %
HYDROXYAPATITE 24H ENGDIFF UR: 10 %
SIZE STONE: NORMAL MM
SPEC SOURCE SUBJ: NORMAL
STONE ANALYSIS-IMP: NORMAL
WT STONE: 43 MG

## 2025-06-24 ENCOUNTER — PROCEDURE VISIT (OUTPATIENT)
Dept: UROLOGY | Facility: AMBULATORY SURGERY CENTER | Age: 70
End: 2025-06-24
Payer: MEDICARE

## 2025-06-24 VITALS
SYSTOLIC BLOOD PRESSURE: 128 MMHG | HEART RATE: 57 BPM | OXYGEN SATURATION: 98 % | WEIGHT: 158 LBS | HEIGHT: 62 IN | BODY MASS INDEX: 29.08 KG/M2 | DIASTOLIC BLOOD PRESSURE: 80 MMHG

## 2025-06-24 DIAGNOSIS — I21.4 NSTEMI (NON-ST ELEVATED MYOCARDIAL INFARCTION) (HCC): ICD-10-CM

## 2025-06-24 DIAGNOSIS — N20.1 BILATERAL URETERAL CALCULI: Primary | ICD-10-CM

## 2025-06-24 PROCEDURE — 52310 CYSTOSCOPY AND TREATMENT: CPT

## 2025-06-24 NOTE — PROGRESS NOTES
"6/24/2025      Assessment and Plan    70 y.o. female managed by our office    Bilateral ureteral calculi  Status post cystoscopy, removal of bilateral ureteral stents, retrograde pyelogram, bilateral ureteroscopy, only and laser lithotripsy, bilateral stone extraction, and insertion of bilateral ureteral stents for definitive treatment of bilaterally obstructing 5 mm ureteral calculi  Patient returns the office today to undergo uteroscopy and bilateral ureteral stent removal.  The procedure was explained in detail and consent was obtained.  The ureteral stents were removed without complication.  Refer to procedure note.  We discussed that the patient should complete her 3-day course of cefdinir for UTI prophylaxis.  Additionally, recommended that the patient undergo an ultrasound and a KUB in 4 to 6 weeks following stent removal in the office.  Patient should return to the office in about 8 weeks to review imaging and discuss stone prevention.       Cystoscopy     Date/Time  6/24/2025 11:40 AM     Performed by  Aric Kraft PA-C   Authorized by  Aric Kraft PA-C       Universal Protocol:  procedure performed by consultantConsent: Verbal consent obtained. Written consent obtained  Risks and benefits: risks, benefits and alternatives were discussed  Consent given by: patient  Time out: Immediately prior to procedure a \"time out\" was called to verify the correct patient, procedure, equipment, support staff and site/side marked as required.  Patient understanding: patient states understanding of the procedure being performed  Patient consent: the patient's understanding of the procedure matches consent given  Procedure consent: procedure consent matches procedure scheduled  Relevant documents: relevant documents present and verified  Test results: test results available and properly labeled  Required items: required blood products, implants, devices, and special equipment available  Patient identity confirmed: " verbally with patient      Procedure Details:  Procedure type: bilateral ureteral stent    Patient tolerance: Patient tolerated the procedure well with no immediate complications    Additional Procedure Details: The patient returns to the office today to undergo cystoscopy with bilateral stent removal. Risk and benefits of the procedure were discussed and informed consent was obtained.  The patient was placed in the modified supine position. The genitalia were prepped and draped in a sterile fashion. Viscous lidocaine was used for local anesthesia. The flexible cystoscope was passed. The bladder was inspected. The stent was identified coming from the right and left ureteral orifice. The stents were grasped with a flexible grasper and was then removed in its entirety without complications. Overall the patient tolerated the procedure.    The patient was provided with a 3 day prescription of cefdinir for infection prophylaxis following the procedure.    They were made aware to advise our office of any fevers greater than 101 degrees Fahrenheit, malaise, or chills.  They were advised that it is normal to have cramping pain on the ipsilateral side for a day or so after ureteral stent removal.  They are to remain well hydrated in the coming days.        History of Present Illness  Osito Carlson is a 70 y.o. female here for evaluation of bilateral ureteral calculi.  Patient was initially seen in the emergency department on 5/12/2025 with acute right sided flank pain.  CT abdomen and pelvis without contrast performed 5/12/2025 revealed bilateral proximal ureteral calculi measuring 5 mm causing moderate bilateral hydroureteronephrosis as well as right perinephric fat stranding.    Patient ultimately underwent cystoscopy, retrograde pyelogram, and insertion of bilateral ureteral stents given the acute infection at the time of her kidney stone episode.  Plan was to proceed with a second stage surgery for definitive management  "of the obstructing renal calculi.  Patient returned to the operating room on 6/5/2025 to undergo cystoscopy, removal of bilateral ureteral stents, retrograde pyelogram, bilateral ureteroscopy, holmium laser lithotripsy, bilateral stone extraction, and insertion of bilateral ureteral stents for treatment of the bilaterally obstructing 5 mm ureteral calculi.    Patient returns the office today to undergo bilateral ureteral stent removal in the office.  There is noted that the patient should undergo a KUB and ultrasound in 4 to 6 weeks after ureteral stent removal.            Review of Systems   Constitutional:  Negative for chills and fever.   HENT:  Negative for ear pain and sore throat.    Eyes:  Negative for pain and visual disturbance.   Respiratory:  Negative for cough and shortness of breath.    Cardiovascular:  Negative for chest pain and palpitations.   Gastrointestinal:  Negative for abdominal pain and vomiting.   Genitourinary:  Positive for flank pain, frequency and urgency. Negative for decreased urine volume, difficulty urinating, dysuria and hematuria.   Musculoskeletal:  Negative for arthralgias and back pain.   Skin:  Negative for color change and rash.   Neurological:  Negative for seizures and syncope.   All other systems reviewed and are negative.               Vitals  Vitals:    06/24/25 1126   BP: 128/80   BP Location: Left arm   Patient Position: Sitting   Cuff Size: Adult   Pulse: 57   SpO2: 98%   Weight: 71.7 kg (158 lb)   Height: 5' 2\" (1.575 m)       Physical Exam  Vitals reviewed.   Constitutional:       General: She is not in acute distress.     Appearance: Normal appearance. She is not ill-appearing.   HENT:      Head: Normocephalic and atraumatic.      Nose: Nose normal.     Eyes:      General: No scleral icterus.    Pulmonary:      Effort: No respiratory distress.   Abdominal:      General: Abdomen is flat. There is no distension.      Palpations: Abdomen is soft.      Tenderness: There " "is no abdominal tenderness.     Musculoskeletal:         General: Normal range of motion.      Cervical back: Normal range of motion.     Skin:     General: Skin is warm.      Coloration: Skin is not jaundiced.     Neurological:      Mental Status: She is alert and oriented to person, place, and time.      Gait: Gait normal.     Psychiatric:         Mood and Affect: Mood normal.         Behavior: Behavior normal.           Past History  Past Medical History[1]  Social History[2]  Tobacco Use History[3]  Family History[4]    The following portions of the patient's history were reviewed and updated as appropriate: allergies, current medications, past medical history, past social history, past surgical history and problem list.    Results  No results found for this or any previous visit (from the past hour).]  No results found for: \"PSA\"  Lab Results   Component Value Date    CALCIUM 9.4 05/14/2025    K 3.9 05/14/2025    CO2 26 05/14/2025     05/14/2025    BUN 16 05/14/2025    CREATININE 0.82 05/14/2025     Lab Results   Component Value Date    WBC 10.79 (H) 05/14/2025    HGB 12.5 05/14/2025    HCT 38.9 05/14/2025    MCV 89 05/14/2025     05/14/2025             [1]   Past Medical History:  Diagnosis Date    Chronic kidney disease     Colon polyp     Diabetes mellitus (HCC)     Elevated troponin 09/12/2021    Hypertension     Hypertensive urgency 09/12/2021    Kidney stone     Myocardial infarction (HCC)    [2]   Social History  Socioeconomic History    Marital status: /Civil Union   Tobacco Use    Smoking status: Never    Smokeless tobacco: Never   Vaping Use    Vaping status: Never Used   Substance and Sexual Activity    Alcohol use: Never    Drug use: Never     Social Drivers of Health     Financial Resource Strain: Low Risk  (12/17/2024)    [facility]    Overall Financial Resource Strain (CARDIA)     Difficulty of Paying Living Expenses: Not hard at all   Food " Insecurity: No Food Insecurity (5/12/2025)    Nursing - Inadequate Food Risk Classification     Ran Out of Food in the Last Year: Never true   Transportation Needs: No Transportation Needs (5/12/2025)    Nursing - Transportation Risk Classification     Lack of Transportation: No   Stress: No Stress Concern Present (8/22/2023)    Received from [facility]    [employer] - Occupational Stress Questionnaire     Feeling of Stress : Not at all   Social Connections: Feeling Socially Integrated (12/17/2024)    Received from [facility]    OASIS : Social Isolation     How often do you feel lonely or isolated from those around you?: Never   Intimate Partner Violence: Unknown (5/12/2025)    Nursing IPS     Physically Hurt by Someone: No     Hurt or Threatened by Someone: No   Housing Stability: Unknown (5/12/2025)    Nursing: Inadequate Housing Risk Classification     Unable to Pay for Housing in the Last Year: No     Has Housing: No   [3]   Social History  Tobacco Use   Smoking Status Never   Smokeless Tobacco Never   [4]   Family History  Problem Relation Name Age of Onset    No Known Problems Mother      Heart disease Father      No Known Problems Sister      No Known Problems Son      No Known Problems Son      No Known Problems Son      No Known Problems Maternal Grandmother      No Known Problems Maternal Grandfather      No Known Problems Paternal Grandmother      No Known Problems Paternal Grandfather      No Known Problems Maternal Aunt      No Known Problems Maternal Aunt      No Known Problems Maternal Aunt      No Known Problems Maternal Aunt      No Known Problems Paternal Aunt

## 2025-06-24 NOTE — ASSESSMENT & PLAN NOTE
Status post cystoscopy, removal of bilateral ureteral stents, retrograde pyelogram, bilateral ureteroscopy, only and laser lithotripsy, bilateral stone extraction, and insertion of bilateral ureteral stents for definitive treatment of bilaterally obstructing 5 mm ureteral calculi  Patient returns the office today to undergo uteroscopy and bilateral ureteral stent removal.  The procedure was explained in detail and consent was obtained.  The ureteral stents were removed without complication.  Refer to procedure note.  We discussed that the patient should complete her 3-day course of cefdinir for UTI prophylaxis.  Additionally, recommended that the patient undergo an ultrasound and a KUB in 4 to 6 weeks following stent removal in the office.  Patient should return to the office in about 8 weeks to review imaging and discuss stone prevention.

## 2025-06-25 RX ORDER — METOPROLOL TARTRATE 25 MG/1
25 TABLET, FILM COATED ORAL EVERY 12 HOURS SCHEDULED
Qty: 180 TABLET | Refills: 1 | Status: SHIPPED | OUTPATIENT
Start: 2025-06-25 | End: 2026-06-20

## 2025-07-21 ENCOUNTER — HOSPITAL ENCOUNTER (OUTPATIENT)
Dept: RADIOLOGY | Facility: HOSPITAL | Age: 70
Discharge: HOME | End: 2025-07-21
Attending: UROLOGY
Payer: MEDICARE

## 2025-07-21 ENCOUNTER — HOSPITAL ENCOUNTER (OUTPATIENT)
Dept: ULTRASOUND IMAGING | Facility: HOSPITAL | Age: 70
Discharge: HOME | End: 2025-07-21
Payer: MEDICARE

## 2025-07-21 DIAGNOSIS — N20.1 URETERAL STONE: ICD-10-CM

## 2025-07-21 PROCEDURE — 74018 RADEX ABDOMEN 1 VIEW: CPT

## 2025-07-21 PROCEDURE — 76775 US EXAM ABDO BACK WALL LIM: CPT

## 2025-07-25 ENCOUNTER — RESULTS FOLLOW-UP (OUTPATIENT)
Dept: OTHER | Facility: HOSPITAL | Age: 70
End: 2025-07-25

## 2025-07-25 DIAGNOSIS — N13.30 HYDRONEPHROSIS DETERMINED BY ULTRASOUND: ICD-10-CM

## 2025-07-25 DIAGNOSIS — Z87.442 HISTORY OF NEPHROLITHIASIS: Primary | ICD-10-CM

## 2025-07-25 NOTE — TELEPHONE ENCOUNTER
----- Message from Kumar Bradshaw MD sent at 7/25/2025 12:00 PM EDT -----  Please let the patient know that her hydronephrosis is overall stable and mild but I think it is important that we get a BMP to check her kidney function now and then also repeat imaging via CT IVP   in 3 to 5 months.  ----- Message -----  From: Interface, Radiology Results In  Sent: 7/21/2025  12:16 PM EDT  To: Kumar Bradshaw MD

## 2025-07-25 NOTE — TELEPHONE ENCOUNTER
Called and spoke to the patient to relay the message above. Patient expressed understanding and had no further questions or concerns at this time. Agreeable to BMP now and CT in 3-5 months

## 2025-08-06 ENCOUNTER — APPOINTMENT (OUTPATIENT)
Dept: LAB | Facility: HOSPITAL | Age: 70
End: 2025-08-06
Attending: UROLOGY
Payer: MEDICARE

## (undated) DEVICE — PACK TUR

## (undated) DEVICE — LUBRICANT JELLY SURGILUBE TUBE 2OZ FLIP TOP

## (undated) DEVICE — PREMIUM DRY TRAY LF: Brand: MEDLINE INDUSTRIES, INC.

## (undated) DEVICE — LUBRICANT JELLY SURGILUBE TUBE 4OZ FLIP TOP

## (undated) DEVICE — CATH URETERAL 5FR X 70 CM FLEX TIP POLYUR BARD

## (undated) DEVICE — UROLOGIC DRAIN BAG: Brand: UNBRANDED

## (undated) DEVICE — 3M™ STERI-STRIP™ COMPOUND BENZOIN TINCTURE 40 BAGS/CARTON 4 CARTONS/CASE C1544: Brand: 3M™ STERI-STRIP™

## (undated) DEVICE — SINGLE-USE DIGITAL FLEXIBLE URETEROSCOPE: Brand: APTRA

## (undated) DEVICE — TUBING SUCTION 5MM X 12 FT

## (undated) DEVICE — GLOVE SRG BIOGEL 7.5

## (undated) DEVICE — FIBER STD QUANTA 272 MICRON

## (undated) DEVICE — INVIEW CLEAR LEGGINGS: Brand: CONVERTORS

## (undated) DEVICE — GUIDEWIRE STRGHT TIP 0.035 IN  SOLO PLUS

## (undated) DEVICE — SPECIMEN CONTAINER STERILE PEEL PACK

## (undated) DEVICE — BASKET SPECIMEN RETRIVAL 1.9FR 120CM

## (undated) DEVICE — SINGLE PORT MANIFOLD: Brand: NEPTUNE 2

## (undated) DEVICE — 3M™ TEGADERM™ TRANSPARENT FILM DRESSING FRAME STYLE, 1624W, 2-3/8 IN X 2-3/4 IN (6 CM X 7 CM), 100/CT 4CT/CASE: Brand: 3M™ TEGADERM™

## (undated) DEVICE — VALVE SUCTION-IRR 2.5MM ADJ UROSEAL

## (undated) DEVICE — SYRINGE 20ML LL

## (undated) DEVICE — SHEATH URETERAL ACCESS 11/13FR 35CM PROXIS

## (undated) DEVICE — CHLORHEXIDINE 4PCT 4 OZ